# Patient Record
Sex: MALE | Race: WHITE | NOT HISPANIC OR LATINO | URBAN - METROPOLITAN AREA
[De-identification: names, ages, dates, MRNs, and addresses within clinical notes are randomized per-mention and may not be internally consistent; named-entity substitution may affect disease eponyms.]

---

## 2019-06-19 ENCOUNTER — INPATIENT (INPATIENT)
Facility: HOSPITAL | Age: 59
LOS: 0 days | Discharge: HOME | DRG: 203 | End: 2019-06-20
Attending: STUDENT IN AN ORGANIZED HEALTH CARE EDUCATION/TRAINING PROGRAM | Admitting: STUDENT IN AN ORGANIZED HEALTH CARE EDUCATION/TRAINING PROGRAM
Payer: COMMERCIAL

## 2019-06-19 VITALS — HEIGHT: 73 IN | WEIGHT: 250 LBS

## 2019-06-19 DIAGNOSIS — E86.0 DEHYDRATION: ICD-10-CM

## 2019-06-19 DIAGNOSIS — J39.9 DISEASE OF UPPER RESPIRATORY TRACT, UNSPECIFIED: ICD-10-CM

## 2019-06-19 DIAGNOSIS — R00.0 TACHYCARDIA, UNSPECIFIED: ICD-10-CM

## 2019-06-19 DIAGNOSIS — J45.909 UNSPECIFIED ASTHMA, UNCOMPLICATED: ICD-10-CM

## 2019-06-19 DIAGNOSIS — J45.901 UNSPECIFIED ASTHMA WITH (ACUTE) EXACERBATION: ICD-10-CM

## 2019-06-19 DIAGNOSIS — R09.02 HYPOXEMIA: ICD-10-CM

## 2019-06-19 LAB
ALBUMIN SERPL ELPH-MCNC: 4.3 G/DL — SIGNIFICANT CHANGE UP (ref 3.3–5)
ALP SERPL-CCNC: 49 U/L — SIGNIFICANT CHANGE UP (ref 40–120)
ALT FLD-CCNC: 47 U/L DA — HIGH (ref 10–45)
ANION GAP SERPL CALC-SCNC: 9 MMOL/L — SIGNIFICANT CHANGE UP (ref 5–17)
APTT BLD: 31.5 SEC — SIGNIFICANT CHANGE UP (ref 27.5–36.3)
AST SERPL-CCNC: 27 U/L — SIGNIFICANT CHANGE UP (ref 10–40)
BASOPHILS # BLD AUTO: 0.06 K/UL — SIGNIFICANT CHANGE UP (ref 0–0.2)
BASOPHILS NFR BLD AUTO: 0.5 % — SIGNIFICANT CHANGE UP (ref 0–2)
BILIRUB SERPL-MCNC: 1.3 MG/DL — HIGH (ref 0.2–1.2)
BUN SERPL-MCNC: 17 MG/DL — SIGNIFICANT CHANGE UP (ref 7–23)
CALCIUM SERPL-MCNC: 9.2 MG/DL — SIGNIFICANT CHANGE UP (ref 8.4–10.5)
CHLORIDE SERPL-SCNC: 104 MMOL/L — SIGNIFICANT CHANGE UP (ref 96–108)
CO2 BLDA-SCNC: 25 MMOL/L — SIGNIFICANT CHANGE UP (ref 22–30)
CO2 SERPL-SCNC: 28 MMOL/L — SIGNIFICANT CHANGE UP (ref 22–31)
CREAT SERPL-MCNC: 1.14 MG/DL — SIGNIFICANT CHANGE UP (ref 0.5–1.3)
D DIMER BLD IA.RAPID-MCNC: 241 NG/ML DDU — HIGH
EOSINOPHIL # BLD AUTO: 0.12 K/UL — SIGNIFICANT CHANGE UP (ref 0–0.5)
EOSINOPHIL NFR BLD AUTO: 1 % — SIGNIFICANT CHANGE UP (ref 0–6)
GLUCOSE SERPL-MCNC: 150 MG/DL — HIGH (ref 70–99)
HCT VFR BLD CALC: 53.3 % — HIGH (ref 39–50)
HGB BLD-MCNC: 18.4 G/DL — HIGH (ref 13–17)
HOROWITZ INDEX BLDA+IHG-RTO: SIGNIFICANT CHANGE UP
IMM GRANULOCYTES NFR BLD AUTO: 0.3 % — SIGNIFICANT CHANGE UP (ref 0–1.5)
INR BLD: 1.09 RATIO — SIGNIFICANT CHANGE UP (ref 0.88–1.16)
LYMPHOCYTES # BLD AUTO: 0.88 K/UL — LOW (ref 1–3.3)
LYMPHOCYTES # BLD AUTO: 7.2 % — LOW (ref 13–44)
MCHC RBC-ENTMCNC: 30.2 PG — SIGNIFICANT CHANGE UP (ref 27–34)
MCHC RBC-ENTMCNC: 34.5 GM/DL — SIGNIFICANT CHANGE UP (ref 32–36)
MCV RBC AUTO: 87.5 FL — SIGNIFICANT CHANGE UP (ref 80–100)
MONOCYTES # BLD AUTO: 0.73 K/UL — SIGNIFICANT CHANGE UP (ref 0–0.9)
MONOCYTES NFR BLD AUTO: 5.9 % — SIGNIFICANT CHANGE UP (ref 2–14)
NEUTROPHILS # BLD AUTO: 10.46 K/UL — HIGH (ref 1.8–7.4)
NEUTROPHILS NFR BLD AUTO: 85.1 % — HIGH (ref 43–77)
NRBC # BLD: 0 /100 WBCS — SIGNIFICANT CHANGE UP (ref 0–0)
NT-PROBNP SERPL-SCNC: 26 PG/ML — SIGNIFICANT CHANGE UP (ref 0–300)
PCO2 BLDA: 36 MMHG — SIGNIFICANT CHANGE UP (ref 32–46)
PH BLDA: 7.44 — SIGNIFICANT CHANGE UP (ref 7.35–7.45)
PLATELET # BLD AUTO: 169 K/UL — SIGNIFICANT CHANGE UP (ref 150–400)
PO2 BLDA: 54 MMHG — LOW (ref 74–108)
POTASSIUM SERPL-MCNC: 4 MMOL/L — SIGNIFICANT CHANGE UP (ref 3.5–5.3)
POTASSIUM SERPL-SCNC: 4 MMOL/L — SIGNIFICANT CHANGE UP (ref 3.5–5.3)
PROT SERPL-MCNC: 8.2 G/DL — SIGNIFICANT CHANGE UP (ref 6–8.3)
PROTHROM AB SERPL-ACNC: 12.2 SEC — SIGNIFICANT CHANGE UP (ref 10–12.9)
RBC # BLD: 6.09 M/UL — HIGH (ref 4.2–5.8)
RBC # FLD: 12.5 % — SIGNIFICANT CHANGE UP (ref 10.3–14.5)
SAO2 % BLDA: 89 % — LOW (ref 92–96)
SODIUM SERPL-SCNC: 141 MMOL/L — SIGNIFICANT CHANGE UP (ref 135–145)
TROPONIN I SERPL-MCNC: 0.02 NG/ML — SIGNIFICANT CHANGE UP (ref 0.02–0.06)
TROPONIN I SERPL-MCNC: <.017 NG/ML — LOW (ref 0.02–0.06)
WBC # BLD: 12.29 K/UL — HIGH (ref 3.8–10.5)
WBC # FLD AUTO: 12.29 K/UL — HIGH (ref 3.8–10.5)

## 2019-06-19 PROCEDURE — 99291 CRITICAL CARE FIRST HOUR: CPT

## 2019-06-19 PROCEDURE — 93010 ELECTROCARDIOGRAM REPORT: CPT | Mod: 77

## 2019-06-19 PROCEDURE — 71045 X-RAY EXAM CHEST 1 VIEW: CPT | Mod: 26

## 2019-06-19 PROCEDURE — 71275 CT ANGIOGRAPHY CHEST: CPT | Mod: 26

## 2019-06-19 PROCEDURE — 99223 1ST HOSP IP/OBS HIGH 75: CPT

## 2019-06-19 RX ORDER — ONDANSETRON 8 MG/1
4 TABLET, FILM COATED ORAL ONCE
Refills: 0 | Status: COMPLETED | OUTPATIENT
Start: 2019-06-19 | End: 2019-06-19

## 2019-06-19 RX ORDER — IPRATROPIUM/ALBUTEROL SULFATE 18-103MCG
3 AEROSOL WITH ADAPTER (GRAM) INHALATION ONCE
Refills: 0 | Status: COMPLETED | OUTPATIENT
Start: 2019-06-19 | End: 2019-06-19

## 2019-06-19 RX ORDER — IPRATROPIUM/ALBUTEROL SULFATE 18-103MCG
3 AEROSOL WITH ADAPTER (GRAM) INHALATION EVERY 6 HOURS
Refills: 0 | Status: DISCONTINUED | OUTPATIENT
Start: 2019-06-19 | End: 2019-06-20

## 2019-06-19 RX ORDER — IBUPROFEN 200 MG
400 TABLET ORAL ONCE
Refills: 0 | Status: COMPLETED | OUTPATIENT
Start: 2019-06-19 | End: 2019-06-19

## 2019-06-19 RX ORDER — PANTOPRAZOLE SODIUM 20 MG/1
40 TABLET, DELAYED RELEASE ORAL ONCE
Refills: 0 | Status: COMPLETED | OUTPATIENT
Start: 2019-06-19 | End: 2019-06-19

## 2019-06-19 RX ORDER — MAGNESIUM SULFATE 500 MG/ML
1 VIAL (ML) INJECTION
Refills: 0 | Status: COMPLETED | OUTPATIENT
Start: 2019-06-19 | End: 2019-06-19

## 2019-06-19 RX ORDER — FAMOTIDINE 10 MG/ML
20 INJECTION INTRAVENOUS DAILY
Refills: 0 | Status: DISCONTINUED | OUTPATIENT
Start: 2019-06-20 | End: 2019-06-20

## 2019-06-19 RX ORDER — ACETAMINOPHEN 500 MG
650 TABLET ORAL EVERY 6 HOURS
Refills: 0 | Status: DISCONTINUED | OUTPATIENT
Start: 2019-06-19 | End: 2019-06-20

## 2019-06-19 RX ORDER — ALBUTEROL 90 UG/1
1 AEROSOL, METERED ORAL EVERY 4 HOURS
Refills: 0 | Status: DISCONTINUED | OUTPATIENT
Start: 2019-06-19 | End: 2019-06-20

## 2019-06-19 RX ORDER — SODIUM CHLORIDE 9 MG/ML
1000 INJECTION INTRAMUSCULAR; INTRAVENOUS; SUBCUTANEOUS ONCE
Refills: 0 | Status: COMPLETED | OUTPATIENT
Start: 2019-06-19 | End: 2019-06-19

## 2019-06-19 RX ORDER — ONDANSETRON 8 MG/1
4 TABLET, FILM COATED ORAL EVERY 6 HOURS
Refills: 0 | Status: DISCONTINUED | OUTPATIENT
Start: 2019-06-19 | End: 2019-06-20

## 2019-06-19 RX ORDER — MAGNESIUM SULFATE 500 MG/ML
2 VIAL (ML) INJECTION ONCE
Refills: 0 | Status: DISCONTINUED | OUTPATIENT
Start: 2019-06-19 | End: 2019-06-19

## 2019-06-19 RX ORDER — TIOTROPIUM BROMIDE 18 UG/1
1 CAPSULE ORAL; RESPIRATORY (INHALATION) DAILY
Refills: 0 | Status: DISCONTINUED | OUTPATIENT
Start: 2019-06-19 | End: 2019-06-20

## 2019-06-19 RX ORDER — SODIUM CHLORIDE 9 MG/ML
1000 INJECTION INTRAMUSCULAR; INTRAVENOUS; SUBCUTANEOUS
Refills: 0 | Status: DISCONTINUED | OUTPATIENT
Start: 2019-06-19 | End: 2019-06-20

## 2019-06-19 RX ADMIN — Medication 110 MILLIGRAM(S): at 13:58

## 2019-06-19 RX ADMIN — SODIUM CHLORIDE 1000 MILLILITER(S): 9 INJECTION INTRAMUSCULAR; INTRAVENOUS; SUBCUTANEOUS at 12:00

## 2019-06-19 RX ADMIN — PANTOPRAZOLE SODIUM 40 MILLIGRAM(S): 20 TABLET, DELAYED RELEASE ORAL at 21:42

## 2019-06-19 RX ADMIN — Medication 400 MILLIGRAM(S): at 22:45

## 2019-06-19 RX ADMIN — SODIUM CHLORIDE 150 MILLILITER(S): 9 INJECTION INTRAMUSCULAR; INTRAVENOUS; SUBCUTANEOUS at 15:45

## 2019-06-19 RX ADMIN — Medication 1 GRAM(S): at 11:33

## 2019-06-19 RX ADMIN — Medication 650 MILLIGRAM(S): at 18:37

## 2019-06-19 RX ADMIN — SODIUM CHLORIDE 1000 MILLILITER(S): 9 INJECTION INTRAMUSCULAR; INTRAVENOUS; SUBCUTANEOUS at 11:50

## 2019-06-19 RX ADMIN — Medication 40 MILLIGRAM(S): at 20:19

## 2019-06-19 RX ADMIN — Medication 3 MILLILITER(S): at 11:53

## 2019-06-19 RX ADMIN — ONDANSETRON 4 MILLIGRAM(S): 8 TABLET, FILM COATED ORAL at 21:42

## 2019-06-19 RX ADMIN — Medication 3 MILLILITER(S): at 21:43

## 2019-06-19 RX ADMIN — Medication 125 MILLIGRAM(S): at 08:33

## 2019-06-19 RX ADMIN — Medication 650 MILLIGRAM(S): at 19:30

## 2019-06-19 RX ADMIN — Medication 1 GRAM(S): at 13:11

## 2019-06-19 RX ADMIN — Medication 3 MILLILITER(S): at 11:40

## 2019-06-19 RX ADMIN — Medication 3 MILLILITER(S): at 08:10

## 2019-06-19 RX ADMIN — SODIUM CHLORIDE 1000 MILLILITER(S): 9 INJECTION INTRAMUSCULAR; INTRAVENOUS; SUBCUTANEOUS at 10:34

## 2019-06-19 RX ADMIN — Medication 100 GRAM(S): at 12:11

## 2019-06-19 RX ADMIN — Medication 100 GRAM(S): at 10:32

## 2019-06-19 RX ADMIN — Medication 400 MILLIGRAM(S): at 21:42

## 2019-06-19 RX ADMIN — Medication 3 MILLILITER(S): at 15:44

## 2019-06-19 RX ADMIN — SODIUM CHLORIDE 150 MILLILITER(S): 9 INJECTION INTRAMUSCULAR; INTRAVENOUS; SUBCUTANEOUS at 20:19

## 2019-06-19 RX ADMIN — SODIUM CHLORIDE 1000 MILLILITER(S): 9 INJECTION INTRAMUSCULAR; INTRAVENOUS; SUBCUTANEOUS at 10:38

## 2019-06-19 NOTE — H&P ADULT - NSHPPHYSICALEXAM_GEN_ALL_CORE
T(C): 36.7 (06-19-19 @ 12:04), Max: 36.9 (06-19-19 @ 08:15)  HR: 110 (06-19-19 @ 13:26) (108 - 133)  BP: 159/86 (06-19-19 @ 13:26) (115/89 - 159/86)  RR: 19 (06-19-19 @ 13:26) (14 - 22)  SpO2: 92% (06-19-19 @ 13:26) (87% - 92%)  Wt(kg): --Vital Signs Last 24 Hrs  T(C): 36.7 (19 Jun 2019 12:04), Max: 36.9 (19 Jun 2019 08:15)  T(F): 98 (19 Jun 2019 12:04), Max: 98.5 (19 Jun 2019 08:15)  HR: 110 (19 Jun 2019 13:26) (108 - 133)  BP: 159/86 (19 Jun 2019 13:26) (115/89 - 159/86)  BP(mean): --  RR: 19 (19 Jun 2019 13:26) (14 - 22)  SpO2: 92% (19 Jun 2019 13:26) (87% - 92%)    PHYSICAL EXAM:  GENERAL: NAD, well-groomed, well-developed  HEAD:  Atraumatic, Normocephalic  EYES: EOMI, PERRLA, conjunctiva and sclera clear  ENMT: No tonsillar erythema, exudates, or enlargement; Moist mucous membranes, Good dentition, No lesions  NECK: Supple, No JVD, Normal thyroid  NERVOUS SYSTEM:  Alert & Oriented X3, Good concentration; Motor Strength 5/5 B/L upper and lower extremities; DTRs 2+ intact and symmetric  CHEST/LUNG: poor air entry throughout, no crackles or rhonchi heard  HEART: Regular rate and rhythm; No murmurs, rubs, or gallops; tachycardic  ABDOMEN: Soft, Nontender, Nondistended; obese  EXTREMITIES:  2+ Peripheral Pulses, No clubbing, cyanosis, +1 edema bilaterally  LYMPH: No lymphadenopathy noted  SKIN: No rashes or lesions

## 2019-06-19 NOTE — ED ADULT NURSE NOTE - OBJECTIVE STATEMENT
pt states his symptoms started yesterday, and worsened after 7 pm last night, history of asthma, took mucinex, BBB lungs with wheezes bilaterally. [id number] labored upon arrival with difficulty completing sentences. denies fever, complains of cough with intermittent mucus production this am color green, dark.

## 2019-06-19 NOTE — H&P ADULT - ASSESSMENT
59 year old with a history of asthma comes with 24 hours of shortness of breath.  Reports having a daughter living in his home who has had an upper respiratory infection over the last week.  6/18 developed cough with greenish sputum production, overnight had progressive difficulty breathing and came into the ED.  CTangio was done and ruled out PE.   Patient here with asthma exacerbation secondary to upper respiratory infection      IMPROVE VTE Individual Risk Assessment    RISK                                                                Points    [  ] Previous VTE                                                  3    [  ] Thrombophilia                                               2    [  ] Lower limb paralysis                                      2        (unable to hold up >15 seconds)      [  ] Current Cancer                                              2         (within 6 months)    [  ] Immobilization > 24 hrs                                1    [  ] ICU/CCU stay > 24 hours                              1    [  ] Age > 60                                                      1    IMPROVE VTE Score ______0___    IMPROVE Score 0-1: Low Risk, No VTE prophylaxis required for most patients, encourage ambulation.   IMPROVE Score 2-3: At risk, pharmacologic VTE prophylaxis is indicated for most patients (in the absence of a contraindication)  IMPROVE Score > or = 4: High Risk, pharmacologic VTE prophylaxis is indicated for most patients (in the absence of a contraindication)

## 2019-06-19 NOTE — ED PROVIDER NOTE - CRITICAL CARE PROVIDED
consultation with other physicians/interpretation of diagnostic studies/consult w/ pt's family directly relating to pts condition/additional history taking/direct patient care (not related to procedure)/documentation

## 2019-06-19 NOTE — H&P ADULT - NSHPLABSRESULTS_GEN_ALL_CORE
LABS:                        18.4   12.29 )-----------( 169      ( 19 Jun 2019 08:15 )             53.3     06-19    141  |  104  |  17  ----------------------------<  150<H>  4.0   |  28  |  1.14    Ca    9.2      19 Jun 2019 08:15    TPro  8.2  /  Alb  4.3  /  TBili  1.3<H>  /  DBili  x   /  AST  27  /  ALT  47<H>  /  AlkPhos  49  06-19    PT/INR - ( 19 Jun 2019 08:15 )   PT: 12.2 sec;   INR: 1.09 ratio         PTT - ( 19 Jun 2019 08:15 )  PTT:31.5 sec     CAPILLARY BLOOD GLUCOSE          ABG - ( 19 Jun 2019 09:03 )  pH, Arterial: 7.44  pH, Blood: x     /  pCO2: 36    /  pO2: 54    / HCO3: x     / Base Excess: x     /  SaO2: 89                    RADIOLOGY & ADDITIONAL TESTS:  < from: CT Angio Chest w/ IV Cont (06.19.19 @ 09:50) >        < end of copied text >    < from: Xray Chest 1 View- PORTABLE-Urgent (06.19.19 @ 08:57) >    IMPRESSION: No acute findings.    < end of copied text >      Consultant(s) Notes Reviewed:  [x ] YES  [ ] NO  Care Discussed with Consultants/Other Providers [ x] YES  [ ] NO  Imaging Personally Reviewed:  [ ] YES  [ ] NO

## 2019-06-19 NOTE — CONSULT NOTE ADULT - SUBJECTIVE AND OBJECTIVE BOX
HPI:HPI:  59 year old with a history of asthma comes with 24 hours of shortness of breath.  Reports having a daughter living in his home who has had an upper respiratory infection over the last week.  6/18 developed cough with greenish sputum production, overnight had progressive difficulty breathing and came into the ED.  CTangio was done and ruled out PE. (19 Jun 2019 13:14)      PAST MEDICAL & SURGICAL HISTORY:  Asthma has been inactive for approximately 4 years. 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 No weight loss. .  Eyes: No itching or discharge from the eyes  ENT: .nasal congestion  CV: No chest pain. No palpitations. No lightheadedness or dizziness.   Resp: progressive cough and dyspnea with wheezing over the last  GI: No nausea. No vomiting. No diarrhea.  MSK: No joint pain or pain in any extremities  Integumentary: No skin lesions. No pedal edema.  Neurological: No gross motor weakness. No sensory changes.      OBJECTIVE:  ICU Vital Signs Last 24 Hrs  T(C): 36.7 (19 Jun 2019 12:04), Max: 36.9 (19 Jun 2019 08:15)  T(F): 98 (19 Jun 2019 12:04), Max: 98.5 (19 Jun 2019 08:15)  HR: 112 (19 Jun 2019 15:24) (108 - 133)  BP: 141/83 (19 Jun 2019 15:24) (115/89 - 159/86)  BP(mean): --  ABP: --  ABP(mean): --  RR: 16 (19 Jun 2019 15:55) (14 - 22)  SpO2: 93% (19 Jun 2019 15:55) (87% - 94%)        CAPILLARY BLOOD GLUCOSE          PHYSICAL EXAM:  General: Awake, alert, oriented X 3.   HEENT: Atraumatic, normocephalic.                            .  Lymph Nodes: No palpable lymphadenopathy  Neck: No JVD. No carotid bruit.   respiratory:  scattered wheezing  Cardiovascular: tachycardia  Abdomen: Soft, non-tender,   Extremities: Warm to touch.    Skin: No rashes or skin lesions  Neurological: Motor and sensory examination equal and normal in all four extremities.  Psychiatry: Appropriate mood and affect.    HOSPITAL MEDICATIONS:  MEDICATIONS  (STANDING):  ALBUTerol    90 MICROgram(s) HFA Inhaler 1 Puff(s) Inhalation every 4 hours  ALBUTerol/ipratropium for Nebulization 3 milliLiter(s) Nebulizer every 6 hours  doxycycline IVPB      methylPREDNISolone sodium succinate Injectable 40 milliGRAM(s) IV Push two times a day  sodium chloride 0.9%. 1000 milliLiter(s) (150 mL/Hr) IV Continuous <Continuous>  tiotropium 18 MICROgram(s) Capsule 1 Capsule(s) Inhalation daily    MEDICATIONS  (PRN):  benzonatate 100 milliGRAM(s) Oral every 8 hours PRN Cough  guaiFENesin    Syrup 200 milliGRAM(s) Oral every 6 hours PRN Cough      LABS:                        18.4   12.29 )-----------( 169      ( 19 Jun 2019 08:15 )             53.3     06-19    141  |  104  |  17  ----------------------------<  150<H>  4.0   |  28  |  1.14    Ca    9.2      19 Jun 2019 08:15    TPro  8.2  /  Alb  4.3  /  TBili  1.3<H>  /  DBili  x   /  AST  27  /  ALT  47<H>  /  AlkPhos  49  06-19    PT/INR - ( 19 Jun 2019 08:15 )   PT: 12.2 sec;   INR: 1.09 ratio         PTT - ( 19 Jun 2019 08:15 )  PTT:31.5 sec    Arterial Blood Gas:  06-19 @ 09:03  7.44/36/54/--/89/--  ABG lactate: --        MICROBIOLOGY:     RADIOLOGY:  [ ] Reviewed and interpreted by me     < from: CT Angio Chest w/ IV Cont (06.19.19 @ 09:50) >  EXAM:  CT ANGIO CHEST (W)AW IC      PROCEDURE DATE:  06/19/2019        INTERPRETATION:  CT angiogram chest with contrast    History shortness of breath, tachycardia, elevated d-dimer    Axial MIPS included    Contrast Omnipaque 90 cc; 10 cc discarded    There is no pulmonary embolism. There is no lobar consolidation, central   edema or layering effusion. There is a tiny calcified granuloma in the   left apex. The heart is normal in size. There is no pericardial effusion.    There is mild right axillary adenopathy with a dominant node measuring   1.5 cm in short axis dimension. There is no evidence of central necrosis.   There is no axillary or mediastinal adenopathy. There are small   left-sided thyroid nodules ranging up to 1.7 cm in maximum dimension.    IMPRESSION:    Negative for pulmonary embolism. No acute pulmonary findings. Mild right   axillary adenopathy. Small left thyroid nodules.    < end of copied text >  EKG:

## 2019-06-19 NOTE — ED PROVIDER NOTE - PROGRESS NOTE DETAILS
Pt feels a lot better, no respiratory distress, speaking in complete sentences, not tachypneic. +mild wheezes. Pt hypoxic to 88% on RA, up to 92% on 3L. Awaiting CTA res. Explained to pt importance of being admitted for asthma exacerbation with hypoxia, however pt states because he feels much better he wants to go home. Wants some time to consider admission. CTA results explained to pt. Pt states he feels better but still hypoxic to 92% on 3L. Stressed to pt and wife importance of admission, however pt still insisting on going home. Risk of anoxia and respiratory arrest resulting in death explained to pt. Pt wants more time to decide. Pt agreeable to admission.

## 2019-06-19 NOTE — CONSULT NOTE ADULT - ASSESSMENT
59 year old with a history of asthma comes with 24 hours of shortness of breath.  Reports having a daughter living in his home who has had an upper respiratory infection over the last week.  6/18 developed cough with greenish sputum production, overnight had progressive difficulty breathing and came into the ED.  CTangio was done and ruled out PE.   Patient here with asthma exacerbation secondary to upper respiratory infection      Pulmonary:  This most likely represents a post viral pneumonitis. Agree with current therapy. Awaiting respiratory viral panel results. If clinically improved we'll switch to oral steroids tomorrow in the a.m.

## 2019-06-19 NOTE — ED PROVIDER NOTE - CARE PLAN
Principal Discharge DX:	Moderate asthma with acute exacerbation, unspecified whether persistent Principal Discharge DX:	Moderate asthma with acute exacerbation, unspecified whether persistent  Secondary Diagnosis:	Hypoxia Principal Discharge DX:	Moderate asthma with acute exacerbation, unspecified whether persistent  Secondary Diagnosis:	Hypoxia  Secondary Diagnosis:	Dehydration

## 2019-06-19 NOTE — ED PROVIDER NOTE - CLINICAL SUMMARY MEDICAL DECISION MAKING FREE TEXT BOX
Pt p/w asthma exacerbation, will treat accordingly. Given age, obesity, fam h/o CAD and recent travel will also check Duncan and dimer. Pt p/w asthma exacerbation, improved with duonebs, solumedrol and mag. Given hypoxia, tachycardia and travel, pt ruled out for PE. Pt p/w asthma exacerbation, improved with duonebs, solumedrol and mag. Given hypoxia, tachycardia and travel, pt ruled out for PE. Pt with persistent tachycardia and hypoxia so admitted with asthma exacerbation.

## 2019-06-19 NOTE — ED PROVIDER NOTE - OBJECTIVE STATEMENT
59M h/o mild asthma, no h/o ED visits or intubations, p/w gradual onset of wheezing and SOB since last night. Has not taken any meds. No fevers or chills, no chest pain. Has had dry cough and mild congestion. Had a >2h car ride from the [facility] yesterday when the sx started. No personal or fam h/o DVT/PE but father has CAD. Accompanied by son.

## 2019-06-19 NOTE — H&P ADULT - HISTORY OF PRESENT ILLNESS
59 year old with a history of asthma comes with 24 hours of shortness of breath.  Reports having a daughter living in his home who has had an upper respiratory infection over the last week.  6/18 developed cough with greenish sputum production, overnight had progressive difficulty breathing and came into the ED.  CTangio was done and ruled out PE.

## 2019-06-19 NOTE — H&P ADULT - NSHPREVIEWOFSYSTEMS_GEN_ALL_CORE
REVIEW OF SYSTEMS:  CONSTITUTIONAL: No fever, weight loss, or fatigue  EYES: No eye pain, visual disturbances, or discharge  ENMT:  No difficulty hearing, tinnitus, vertigo; No sinus or throat pain  NECK: No pain or stiffness  BREASTS: No pain, masses, or nipple discharge  RESPIRATORY: + cough, wheezing, chills no hemoptysis; + shortness of breath  CARDIOVASCULAR: No chest pain, palpitations, dizziness, or leg swelling  GASTROINTESTINAL: No abdominal or epigastric pain. No nausea, vomiting, or hematemesis; No diarrhea or constipation. No melena or hematochezia.  GENITOURINARY: No dysuria, frequency, hematuria, or incontinence  NEUROLOGICAL: No headaches, memory loss, loss of strength, numbness, or tremors  SKIN: No itching, burning, rashes, or lesions   LYMPH NODES: No enlarged glands  ENDOCRINE: No heat or cold intolerance; No hair loss  MUSCULOSKELETAL: No joint pain or swelling; No muscle, back, or extremity pain  PSYCHIATRIC: No depression, anxiety, mood swings, or difficulty sleeping  HEME/LYMPH: No easy bruising, or bleeding gums  ALLERY AND IMMUNOLOGIC: No hives or eczema    ALL ROS REVIEWED AND NORMAL EXCEPT AS STATED ABOVE

## 2019-06-20 ENCOUNTER — TRANSCRIPTION ENCOUNTER (OUTPATIENT)
Age: 59
End: 2019-06-20

## 2019-06-20 ENCOUNTER — INBOUND DOCUMENT (OUTPATIENT)
Age: 59
End: 2019-06-20

## 2019-06-20 VITALS — OXYGEN SATURATION: 98 %

## 2019-06-20 PROBLEM — J45.909 UNSPECIFIED ASTHMA, UNCOMPLICATED: Chronic | Status: ACTIVE | Noted: 2019-06-19

## 2019-06-20 LAB
ANION GAP SERPL CALC-SCNC: 11 MMOL/L — SIGNIFICANT CHANGE UP (ref 5–17)
BASOPHILS # BLD AUTO: 0.02 K/UL — SIGNIFICANT CHANGE UP (ref 0–0.2)
BASOPHILS NFR BLD AUTO: 0.2 % — SIGNIFICANT CHANGE UP (ref 0–2)
BUN SERPL-MCNC: 16 MG/DL — SIGNIFICANT CHANGE UP (ref 7–23)
CALCIUM SERPL-MCNC: 8.3 MG/DL — LOW (ref 8.4–10.5)
CHLORIDE SERPL-SCNC: 107 MMOL/L — SIGNIFICANT CHANGE UP (ref 96–108)
CO2 SERPL-SCNC: 24 MMOL/L — SIGNIFICANT CHANGE UP (ref 22–31)
CREAT SERPL-MCNC: 1.07 MG/DL — SIGNIFICANT CHANGE UP (ref 0.5–1.3)
EOSINOPHIL # BLD AUTO: 0 K/UL — SIGNIFICANT CHANGE UP (ref 0–0.5)
EOSINOPHIL NFR BLD AUTO: 0 % — SIGNIFICANT CHANGE UP (ref 0–6)
GLUCOSE SERPL-MCNC: 188 MG/DL — HIGH (ref 70–99)
HCT VFR BLD CALC: 47.4 % — SIGNIFICANT CHANGE UP (ref 39–50)
HCV AB S/CO SERPL IA: 0.15 S/CO — SIGNIFICANT CHANGE UP (ref 0–0.99)
HCV AB SERPL-IMP: SIGNIFICANT CHANGE UP
HGB BLD-MCNC: 16 G/DL — SIGNIFICANT CHANGE UP (ref 13–17)
IMM GRANULOCYTES NFR BLD AUTO: 0.6 % — SIGNIFICANT CHANGE UP (ref 0–1.5)
LYMPHOCYTES # BLD AUTO: 0.5 K/UL — LOW (ref 1–3.3)
LYMPHOCYTES # BLD AUTO: 4.8 % — LOW (ref 13–44)
MCHC RBC-ENTMCNC: 30.4 PG — SIGNIFICANT CHANGE UP (ref 27–34)
MCHC RBC-ENTMCNC: 33.8 GM/DL — SIGNIFICANT CHANGE UP (ref 32–36)
MCV RBC AUTO: 89.9 FL — SIGNIFICANT CHANGE UP (ref 80–100)
MONOCYTES # BLD AUTO: 0.29 K/UL — SIGNIFICANT CHANGE UP (ref 0–0.9)
MONOCYTES NFR BLD AUTO: 2.8 % — SIGNIFICANT CHANGE UP (ref 2–14)
NEUTROPHILS # BLD AUTO: 9.5 K/UL — HIGH (ref 1.8–7.4)
NEUTROPHILS NFR BLD AUTO: 91.6 % — HIGH (ref 43–77)
NRBC # BLD: 0 /100 WBCS — SIGNIFICANT CHANGE UP (ref 0–0)
PLATELET # BLD AUTO: 153 K/UL — SIGNIFICANT CHANGE UP (ref 150–400)
POTASSIUM SERPL-MCNC: 3.9 MMOL/L — SIGNIFICANT CHANGE UP (ref 3.5–5.3)
POTASSIUM SERPL-SCNC: 3.9 MMOL/L — SIGNIFICANT CHANGE UP (ref 3.5–5.3)
RAPID RVP RESULT: DETECTED
RBC # BLD: 5.27 M/UL — SIGNIFICANT CHANGE UP (ref 4.2–5.8)
RBC # FLD: 13 % — SIGNIFICANT CHANGE UP (ref 10.3–14.5)
RV+EV RNA SPEC QL NAA+PROBE: DETECTED
SODIUM SERPL-SCNC: 142 MMOL/L — SIGNIFICANT CHANGE UP (ref 135–145)
WBC # BLD: 10.37 K/UL — SIGNIFICANT CHANGE UP (ref 3.8–10.5)
WBC # FLD AUTO: 10.37 K/UL — SIGNIFICANT CHANGE UP (ref 3.8–10.5)

## 2019-06-20 PROCEDURE — 99232 SBSQ HOSP IP/OBS MODERATE 35: CPT

## 2019-06-20 PROCEDURE — 99239 HOSP IP/OBS DSCHRG MGMT >30: CPT

## 2019-06-20 RX ORDER — ALBUTEROL 90 UG/1
2 AEROSOL, METERED ORAL
Qty: 1 | Refills: 0
Start: 2019-06-20

## 2019-06-20 RX ORDER — IPRATROPIUM/ALBUTEROL SULFATE 18-103MCG
3 AEROSOL WITH ADAPTER (GRAM) INHALATION
Qty: 500 | Refills: 0
Start: 2019-06-20 | End: 2019-07-03

## 2019-06-20 RX ADMIN — Medication 3 MILLILITER(S): at 09:26

## 2019-06-20 RX ADMIN — Medication 110 MILLIGRAM(S): at 05:41

## 2019-06-20 RX ADMIN — Medication 40 MILLIGRAM(S): at 06:11

## 2019-06-20 RX ADMIN — Medication 3 MILLILITER(S): at 04:14

## 2019-06-20 RX ADMIN — SODIUM CHLORIDE 150 MILLILITER(S): 9 INJECTION INTRAMUSCULAR; INTRAVENOUS; SUBCUTANEOUS at 05:41

## 2019-06-20 NOTE — DISCHARGE NOTE PROVIDER - NSDCCPCAREPLAN_GEN_ALL_CORE_FT
PRINCIPAL DISCHARGE DIAGNOSIS  Diagnosis: Moderate asthma with acute exacerbation, unspecified whether persistent  Assessment and Plan of Treatment:       SECONDARY DISCHARGE DIAGNOSES  Diagnosis: Dehydration  Assessment and Plan of Treatment:     Diagnosis: Hypoxia  Assessment and Plan of Treatment:

## 2019-06-20 NOTE — DISCHARGE NOTE NURSING/CASE MANAGEMENT/SOCIAL WORK - NSDCDPATPORTLINK_GEN_ALL_CORE
You can access the [facility] Patient Portal, offered by [facility], by registering with the following website: [email or web address]

## 2019-06-20 NOTE — PROGRESS NOTE ADULT - PROBLEM SELECTOR PLAN 1
Pulm consulted  continue steroids  Continue nebulizers  Tele monitoring  treat URI Improved asthma  Found positive for enterovirus/rhinovirus  Tele monitoring dced  appreciate pulm consult  Will dc with nebs and steroid talya

## 2019-06-20 NOTE — PROGRESS NOTE ADULT - PROBLEM SELECTOR PLAN 6
Started doxy for community acquired URI  Will look for pulm recommendations Stopped antibiotics  Positive for virus

## 2019-06-20 NOTE — PROGRESS NOTE ADULT - PROBLEM SELECTOR PLAN 1
Improved asthma  Found positive for enterovirus/rhinovirus  Tele monitoring dced  appreciate pulm consult  Will dc with nebs and steroid talya

## 2019-06-20 NOTE — PROGRESS NOTE ADULT - ASSESSMENT
59 year old with a history of asthma comes with 24 hours of shortness of breath.  Reports having a daughter living in his home who has had an upper respiratory infection over the last week.  6/18 developed cough with greenish sputum production, overnight had progressive difficulty breathing and came into the ED.  CTangio was done and ruled out PE.   Patient here with asthma exacerbation secondary to upper respiratory infection
59 year old with a history of asthma comes with 24 hours of shortness of breath.  Reports having a daughter living in his home who has had an upper respiratory infection over the last week.  6/18 developed cough with greenish sputum production, overnight had progressive difficulty breathing and came into the ED.  CTangio was done and ruled out PE.   Patient here with asthma exacerbation secondary to upper respiratory infection    Clinically improved today.  Out pt f/u 1 to 2 weeks.  Pt has been asked to call if any recurrence of dyspnea.

## 2019-06-20 NOTE — DISCHARGE NOTE PROVIDER - CARE PROVIDER_API CALL
Stephon Tarango)  Critical Care Medicine  [address] [place] [address], [address]  [place] [address]  Phone: [phone]  Fax: [phone]  Follow Up Time:

## 2019-06-20 NOTE — DISCHARGE NOTE PROVIDER - HOSPITAL COURSE
59 year with history of asthma came with acute asthma exacerbation.  Was found to have enterovirus.  Had one dose of antibiotics (doxy).  Was seen by pulmonary.    Will go home with steroids and nebulizer treatments.    Will follow with Dr. Tarango pulmonary as an outpatient.  Dr. Tarango aware of discharge plan.

## 2019-06-20 NOTE — PROGRESS NOTE ADULT - PROBLEM SELECTOR PLAN 4
Given iV fluids  Will continue maintenance fluids for another 24 hours IVF stopped  Patient appears well hydrated now

## 2019-06-20 NOTE — PROGRESS NOTE ADULT - PROBLEM SELECTOR PROBLEM 1
Moderate asthma with acute exacerbation, unspecified whether persistent
Moderate asthma with acute exacerbation, unspecified whether persistent

## 2019-06-20 NOTE — PROGRESS NOTE ADULT - SUBJECTIVE AND OBJECTIVE BOX
Patient is a 59y old  Male who presents with a chief complaint of shortness of breath (19 Jun 2019 17:22)      Patient seen and examined at bedside.  Patient denies any chest pain and reports breathing is much better.    ALLERGIES:  No Known Allergies    MEDICATIONS:  acetaminophen   Tablet .. 650 milliGRAM(s) Oral every 6 hours PRN  ALBUTerol    90 MICROgram(s) HFA Inhaler 1 Puff(s) Inhalation every 4 hours  ALBUTerol/ipratropium for Nebulization 3 milliLiter(s) Nebulizer every 6 hours  benzonatate 100 milliGRAM(s) Oral every 8 hours PRN  famotidine    Tablet 20 milliGRAM(s) Oral daily  guaiFENesin    Syrup 200 milliGRAM(s) Oral every 6 hours PRN  methylPREDNISolone sodium succinate Injectable 40 milliGRAM(s) IV Push two times a day  ondansetron Injectable 4 milliGRAM(s) IV Push every 6 hours PRN  tiotropium 18 MICROgram(s) Capsule 1 Capsule(s) Inhalation daily    Vital Signs Last 24 Hrs  T(F): 97.8 (20 Jun 2019 07:38), Max: 98.6 (19 Jun 2019 17:40)  HR: 74 (20 Jun 2019 07:38) (74 - 120)  BP: 134/83 (20 Jun 2019 07:38) (134/83 - 159/86)  RR: 19 (20 Jun 2019 07:38) (16 - 19)  SpO2: 94% (20 Jun 2019 09:28) (90% - 98%)  I&O's Summary    19 Jun 2019 07:01  -  20 Jun 2019 07:00  --------------------------------------------------------  IN: 1950 mL / OUT: 800 mL / NET: 1150 mL        PHYSICAL EXAM:  General: NAD, A/O x 3  ENT: MMM  Neck: Supple, No JVD  Lungs: Good air entry, expiratory wheezes   Cardio: RRR, S1/S2, No murmurs  Abdomen: Soft, Nontender, Nondistended; obese  Extremities: No cyanosis, No edema    LABS:                        16.0   10.37 )-----------( 153      ( 20 Jun 2019 05:55 )             47.4     06-20    142  |  107  |  16  ----------------------------<  188  3.9   |  24  |  1.07    Ca    8.3      20 Jun 2019 05:55    TPro  8.2  /  Alb  4.3  /  TBili  1.3  /  DBili  x   /  AST  27  /  ALT  47  /  AlkPhos  49  06-19    eGFR if Non African American: 76 mL/min/1.73M2 (06-20-19 @ 05:55)  eGFR if African American: 88 mL/min/1.73M2 (06-20-19 @ 05:55)    PT/INR - ( 19 Jun 2019 08:15 )   PT: 12.2 sec;   INR: 1.09 ratio         PTT - ( 19 Jun 2019 08:15 )  PTT:31.5 sec    CARDIAC MARKERS ( 19 Jun 2019 12:00 )  <.017 ng/mL / x     / x     / x     / x      CARDIAC MARKERS ( 19 Jun 2019 08:15 )  .024 ng/mL / x     / x     / x     / x                ABG - ( 19 Jun 2019 09:03 )  pH, Arterial: 7.44  pH, Blood: x     /  pCO2: 36    /  pO2: 54    / HCO3: x     / Base Excess: x     /  SaO2: 89                CAPILLARY BLOOD GLUCOSE                RADIOLOGY & ADDITIONAL TESTS:    Care Discussed with Consultants/Other Providers:
Follow-up Pulm Progress Note    Stephon Tarango MD  [phone]    No new respiratory events overnight.  Denies SOB/CP.   Pt seen and examined at 9 am  Medications:  Vital Signs Last 24 Hrs  T(C): 36.6 (20 Jun 2019 07:38), Max: 37 (19 Jun 2019 17:40)  T(F): 97.8 (20 Jun 2019 07:38), Max: 98.6 (19 Jun 2019 17:40)  HR: 74 (20 Jun 2019 07:38) (74 - 120)  BP: 134/83 (20 Jun 2019 07:38) (134/83 - 153/95)  BP(mean): --  RR: 19 (20 Jun 2019 07:38) (16 - 19)  SpO2: 94% (20 Jun 2019 09:28) (90% - 98%)    ABG - ( 19 Jun 2019 09:03 )  pH, Arterial: 7.44  pH, Blood: x     /  pCO2: 36    /  pO2: 54    / HCO3: x     / Base Excess: x     /  SaO2: 89                    06-19 @ 07:01  -  06-20 @ 07:00  --------------------------------------------------------  IN: 1950 mL / OUT: 800 mL / NET: 1150 mL        LABS:                        16.0   10.37 )-----------( 153      ( 20 Jun 2019 05:55 )             47.4     06-20    142  |  107  |  16  ----------------------------<  188<H>  3.9   |  24  |  1.07    Ca    8.3<L>      20 Jun 2019 05:55    TPro  8.2  /  Alb  4.3  /  TBili  1.3<H>  /  DBili  x   /  AST  27  /  ALT  47<H>  /  AlkPhos  49  06-19      PT/INR - ( 19 Jun 2019 08:15 )   PT: 12.2 sec;   INR: 1.09 ratio         PTT - ( 19 Jun 2019 08:15 )  PTT:31.5 sec    Serum Pro-Brain Natriuretic Peptide: 26 pg/mL (06-19-19 @ 08:15)      CULTURES:        Physical Examination:  PULM: Clear to auscultation bilaterally, no significant sputum production  CVS: Regular rate and rhythm, no murmurs, rubs, or gallops  ABD: Soft, non-tender  EXT:  No clubbing, cyanosis, or edema

## 2019-06-21 LAB — HBA1C BLD-MCNC: 6.3 % — HIGH (ref 4–5.6)

## 2019-06-21 PROCEDURE — 36415 COLL VENOUS BLD VENIPUNCTURE: CPT

## 2019-06-21 PROCEDURE — 71275 CT ANGIOGRAPHY CHEST: CPT

## 2019-06-21 PROCEDURE — 87633 RESP VIRUS 12-25 TARGETS: CPT

## 2019-06-21 PROCEDURE — 83036 HEMOGLOBIN GLYCOSYLATED A1C: CPT

## 2019-06-21 PROCEDURE — 85027 COMPLETE CBC AUTOMATED: CPT

## 2019-06-21 PROCEDURE — 87581 M.PNEUMON DNA AMP PROBE: CPT

## 2019-06-21 PROCEDURE — 86803 HEPATITIS C AB TEST: CPT

## 2019-06-21 PROCEDURE — 87798 DETECT AGENT NOS DNA AMP: CPT

## 2019-06-21 PROCEDURE — 82803 BLOOD GASES ANY COMBINATION: CPT

## 2019-06-21 PROCEDURE — 71045 X-RAY EXAM CHEST 1 VIEW: CPT

## 2019-06-21 PROCEDURE — 83880 ASSAY OF NATRIURETIC PEPTIDE: CPT

## 2019-06-21 PROCEDURE — 85730 THROMBOPLASTIN TIME PARTIAL: CPT

## 2019-06-21 PROCEDURE — 85610 PROTHROMBIN TIME: CPT

## 2019-06-21 PROCEDURE — 94640 AIRWAY INHALATION TREATMENT: CPT

## 2019-06-21 PROCEDURE — 80048 BASIC METABOLIC PNL TOTAL CA: CPT

## 2019-06-21 PROCEDURE — 93005 ELECTROCARDIOGRAM TRACING: CPT

## 2019-06-21 PROCEDURE — 96375 TX/PRO/DX INJ NEW DRUG ADDON: CPT

## 2019-06-21 PROCEDURE — 96365 THER/PROPH/DIAG IV INF INIT: CPT | Mod: XU

## 2019-06-21 PROCEDURE — 87486 CHLMYD PNEUM DNA AMP PROBE: CPT

## 2019-06-21 PROCEDURE — 84484 ASSAY OF TROPONIN QUANT: CPT

## 2019-06-21 PROCEDURE — 99291 CRITICAL CARE FIRST HOUR: CPT | Mod: 25

## 2019-06-21 PROCEDURE — 85379 FIBRIN DEGRADATION QUANT: CPT

## 2019-06-21 PROCEDURE — 80053 COMPREHEN METABOLIC PANEL: CPT

## 2019-07-08 ENCOUNTER — APPOINTMENT (OUTPATIENT)
Dept: PULMONOLOGY | Facility: CLINIC | Age: 59
End: 2019-07-08
Payer: COMMERCIAL

## 2019-07-08 ENCOUNTER — LABORATORY RESULT (OUTPATIENT)
Age: 59
End: 2019-07-08

## 2019-07-08 VITALS
BODY MASS INDEX: 34.46 KG/M2 | TEMPERATURE: 97.6 F | HEIGHT: 73 IN | DIASTOLIC BLOOD PRESSURE: 70 MMHG | HEART RATE: 66 BPM | RESPIRATION RATE: 16 BRPM | SYSTOLIC BLOOD PRESSURE: 110 MMHG | OXYGEN SATURATION: 97 % | WEIGHT: 260 LBS

## 2019-07-08 DIAGNOSIS — Z82.49 FAMILY HISTORY OF ISCHEMIC HEART DISEASE AND OTHER DISEASES OF THE CIRCULATORY SYSTEM: ICD-10-CM

## 2019-07-08 PROCEDURE — 99205 OFFICE O/P NEW HI 60 MIN: CPT

## 2019-07-08 RX ORDER — ALBUTEROL SULFATE 108 UG/1
108 (90 BASE) AEROSOL, METERED RESPIRATORY (INHALATION)
Qty: 1 | Refills: 2 | Status: DISCONTINUED | COMMUNITY
Start: 2019-06-20 | End: 2019-07-08

## 2019-07-08 NOTE — PHYSICAL EXAM
[General Appearance - Well Developed] : well developed [Normal Appearance] : normal appearance [Well Groomed] : well groomed [General Appearance - Well Nourished] : well nourished [No Deformities] : no deformities [General Appearance - In No Acute Distress] : no acute distress [Normal Conjunctiva] : the conjunctiva exhibited no abnormalities [Eyelids - No Xanthelasma] : the eyelids demonstrated no xanthelasmas [Normal Oropharynx] : normal oropharynx [Neck Appearance] : the appearance of the neck was normal [Neck Cervical Mass (___cm)] : no neck mass was observed [Jugular Venous Distention Increased] : there was no jugular-venous distention [Thyroid Diffuse Enlargement] : the thyroid was not enlarged [Thyroid Nodule] : there were no palpable thyroid nodules [Heart Rate And Rhythm] : heart rate and rhythm were normal [Murmurs] : no murmurs present [Heart Sounds] : normal S1 and S2 [Respiration, Rhythm And Depth] : normal respiratory rhythm and effort [Auscultation Breath Sounds / Voice Sounds] : lungs were clear to auscultation bilaterally [Exaggerated Use Of Accessory Muscles For Inspiration] : no accessory muscle use [Abnormal Walk] : normal gait [Nail Clubbing] : no clubbing of the fingernails [Cyanosis, Localized] : no localized cyanosis [Petechial Hemorrhages (___cm)] : no petechial hemorrhages [] : no ischemic changes

## 2019-07-12 LAB — TOTAL IGE SMQN RAST: 2919 KU/L

## 2019-07-17 LAB
A ALTERNATA IGE QN: 0.11 KUA/L
A FUMIGATUS IGE QN: 0.14 KUA/L
BERMUDA GRASS IGE QN: 0.16 KUA/L
BOXELDER IGE QN: 0.46 KUA/L
C HERBARUM IGE QN: <0.1 KUA/L
CALIF WALNUT IGE QN: NORMAL
CAT DANDER IGE QN: 6.1 KUA/L
CMN PIGWEED IGE QN: 0.97 KUA/L
COMMON RAGWEED IGE QN: 1.89 KUA/L
COTTONWOOD IGE QN: 0.55 KUA/L
D FARINAE IGE QN: 26.5 KUA/L
D PTERONYSS IGE QN: 14.8 KUA/L
DEPRECATED A ALTERNATA IGE RAST QL: NORMAL
DEPRECATED A FUMIGATUS IGE RAST QL: NORMAL
DEPRECATED BERMUDA GRASS IGE RAST QL: NORMAL
DEPRECATED BOXELDER IGE RAST QL: 1
DEPRECATED C HERBARUM IGE RAST QL: 0
DEPRECATED CAT DANDER IGE RAST QL: 3
DEPRECATED COMMON PIGWEED IGE RAST QL: 2
DEPRECATED COMMON RAGWEED IGE RAST QL: 2
DEPRECATED COTTONWOOD IGE RAST QL: 1
DEPRECATED D FARINAE IGE RAST QL: 4
DEPRECATED D PTERONYSS IGE RAST QL: 3
DEPRECATED DOG DANDER IGE RAST QL: 5
DEPRECATED GOOSEFOOT IGE RAST QL: NORMAL
DEPRECATED LONDON PLANE IGE RAST QL: NORMAL
DEPRECATED MUGWORT IGE RAST QL: NORMAL
DEPRECATED P NOTATUM IGE RAST QL: 0
DEPRECATED RED CEDAR IGE RAST QL: NORMAL
DEPRECATED ROACH IGE RAST QL: 3
DEPRECATED SHEEP SORREL IGE RAST QL: NORMAL
DEPRECATED SILVER BIRCH IGE RAST QL: 3
DEPRECATED TIMOTHY IGE RAST QL: NORMAL
DEPRECATED WHITE ASH IGE RAST QL: NORMAL
DEPRECATED WHITE OAK IGE RAST QL: 3
DOG DANDER IGE QN: 95.1 KUA/L
GOOSEFOOT IGE QN: 0.31 KUA/L
LONDON PLANE IGE QN: NORMAL
MUGWORT IGE QN: 0.26 KUA/L
MULBERRY (T70) CLASS: NORMAL
MULBERRY (T70) CONC: NORMAL
P NOTATUM IGE QN: <0.1 KUA/L
RED CEDAR IGE QN: NORMAL
ROACH IGE QN: 8.62 KUA/L
SHEEP SORREL IGE QN: NORMAL
SILVER BIRCH IGE QN: 15.7 KUA/L
TIMOTHY IGE QN: NORMAL
TREE ALLERG MIX1 IGE QL: NORMAL
WHITE ASH IGE QN: NORMAL
WHITE ELM IGE QN: 2
WHITE ELM IGE QN: 2.21 KUA/L
WHITE OAK IGE QN: 11.7 KUA/L

## 2019-09-18 ENCOUNTER — OUTPATIENT (OUTPATIENT)
Dept: OUTPATIENT SERVICES | Facility: HOSPITAL | Age: 59
LOS: 1 days | End: 2019-09-18
Payer: COMMERCIAL

## 2019-09-18 DIAGNOSIS — J45.20 MILD INTERMITTENT ASTHMA, UNCOMPLICATED: ICD-10-CM

## 2019-09-18 PROCEDURE — 94726 PLETHYSMOGRAPHY LUNG VOLUMES: CPT | Mod: 26

## 2019-09-18 PROCEDURE — 94060 EVALUATION OF WHEEZING: CPT | Mod: 26

## 2019-09-18 PROCEDURE — 94060 EVALUATION OF WHEEZING: CPT

## 2019-09-18 PROCEDURE — 94726 PLETHYSMOGRAPHY LUNG VOLUMES: CPT

## 2019-09-18 PROCEDURE — 94729 DIFFUSING CAPACITY: CPT | Mod: 26

## 2019-09-18 PROCEDURE — 94729 DIFFUSING CAPACITY: CPT

## 2019-09-18 PROCEDURE — 94640 AIRWAY INHALATION TREATMENT: CPT

## 2019-09-18 RX ORDER — ALBUTEROL 90 UG/1
2.5 AEROSOL, METERED ORAL ONCE
Refills: 0 | Status: COMPLETED | OUTPATIENT
Start: 2019-09-18 | End: 2019-09-18

## 2019-09-18 RX ADMIN — ALBUTEROL 2.5 MILLIGRAM(S): 90 AEROSOL, METERED ORAL at 09:40

## 2019-10-16 NOTE — ED ADULT TRIAGE NOTE - NS ED NURSE BANDS TYPE
Called and spoke to patient informed him to increase lantus by 2 units and call in 1 week with blood sugar readings. Patient declined diabetic educator referral, states he has seen her before and his sister is a diabetic educator for [employer].    Name band;

## 2020-06-29 ENCOUNTER — RX RENEWAL (OUTPATIENT)
Age: 60
End: 2020-06-29

## 2020-09-20 ENCOUNTER — TRANSCRIPTION ENCOUNTER (OUTPATIENT)
Age: 60
End: 2020-09-20

## 2021-03-26 ENCOUNTER — APPOINTMENT (OUTPATIENT)
Dept: FAMILY MEDICINE | Facility: CLINIC | Age: 61
End: 2021-03-26
Payer: MEDICAID

## 2021-03-26 VITALS
HEIGHT: 73 IN | DIASTOLIC BLOOD PRESSURE: 80 MMHG | OXYGEN SATURATION: 96 % | RESPIRATION RATE: 15 BRPM | SYSTOLIC BLOOD PRESSURE: 120 MMHG | WEIGHT: 252 LBS | BODY MASS INDEX: 33.4 KG/M2 | HEART RATE: 81 BPM | TEMPERATURE: 97.4 F

## 2021-03-26 DIAGNOSIS — Z82.5 FAMILY HISTORY OF ASTHMA AND OTHER CHRONIC LOWER RESPIRATORY DISEASES: ICD-10-CM

## 2021-03-26 DIAGNOSIS — Z86.39 PERSONAL HISTORY OF OTHER ENDOCRINE, NUTRITIONAL AND METABOLIC DISEASE: ICD-10-CM

## 2021-03-26 DIAGNOSIS — Z78.9 OTHER SPECIFIED HEALTH STATUS: ICD-10-CM

## 2021-03-26 DIAGNOSIS — Z23 ENCOUNTER FOR IMMUNIZATION: ICD-10-CM

## 2021-03-26 DIAGNOSIS — Z84.1 FAMILY HISTORY OF DISORDERS OF KIDNEY AND URETER: ICD-10-CM

## 2021-03-26 PROCEDURE — 99204 OFFICE O/P NEW MOD 45 MIN: CPT

## 2021-03-26 PROCEDURE — 99072 ADDL SUPL MATRL&STAF TM PHE: CPT

## 2021-03-26 NOTE — HISTORY OF PRESENT ILLNESS
[FreeTextEntry8] : establish care, concerns for diabetes, urinary frequency, no thirst issues, fasting glucose 270\par \par Mr Jerry Summers (CROW) is a 60 yo male presents today to establish care. Pt reports past hx of elevated glucose, at one time, was severely high when he was hospitalized. Pt was prescribed insulin at one point, was also following up with endocrinologist. Pt reports since then his glucose numbers had improved after which he was no longer taking the medication as well as was not following up with endocrinologist any more. 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 Was notified this Feb 2021 at physical about uncontrolled DMII, HTN, states he was more stressed and never followed up. Pt reports lately increased frequency of urination, denies thirst, check a random glucose this morning was 270s however states had some grapes prior to that. Pt denies knowing any one in his family with history of DMII.

## 2021-03-26 NOTE — PHYSICAL EXAM
[No Acute Distress] : no acute distress [EOMI] : extraocular movements intact [Normal Oropharynx] : the oropharynx was normal [Supple] : supple [Clear to Auscultation] : lungs were clear to auscultation bilaterally [Normal S1, S2] : normal S1 and S2 [Non Tender] : non-tender [No Rash] : no rash [Normal Gait] : normal gait [Normal Affect] : the affect was normal [de-identified] : obese

## 2021-03-26 NOTE — ASSESSMENT
[FreeTextEntry1] : Lab script for blood work and urinalysis ordered today, will follow up accordingly.\par advised low carb diet, avoid concentrated sweets, exercise, weight loss\par ordered glucometer kit\par advised to keep a log of daily fasting blood glucose\par bring to next visit\par RTO for CPE exam in 2 weeks. \par

## 2021-03-26 NOTE — REVIEW OF SYSTEMS
[Dysuria] : no dysuria [Incontinence] : no incontinence [Hesitancy] : no hesitancy [Nocturia] : no nocturia [Hematuria] : no hematuria [Frequency] : frequency [Negative] : Heme/Lymph

## 2021-03-26 NOTE — HEALTH RISK ASSESSMENT
[] : No [Yes] : Yes [Monthly or less (1 pt)] : Monthly or less (1 point) [1 or 2 (0 pts)] : 1 or 2 (0 points) [Never (0 pts)] : Never (0 points) [No] : In the past 12 months have you used drugs other than those required for medical reasons? No [No falls in past year] : Patient reported no falls in the past year [0] : 2) Feeling down, depressed, or hopeless: Not at all (0) [de-identified] : rare

## 2021-03-29 LAB
25(OH)D3 SERPL-MCNC: 19 NG/ML
ALBUMIN SERPL ELPH-MCNC: 4.7 G/DL
ALP BLD-CCNC: 53 U/L
ALT SERPL-CCNC: 49 U/L
ANION GAP SERPL CALC-SCNC: 14 MMOL/L
APPEARANCE: CLEAR
AST SERPL-CCNC: 23 U/L
BASOPHILS # BLD AUTO: 0.04 K/UL
BASOPHILS NFR BLD AUTO: 0.7 %
BILIRUB SERPL-MCNC: 1.2 MG/DL
BILIRUBIN URINE: NEGATIVE
BLOOD URINE: NEGATIVE
BUN SERPL-MCNC: 19 MG/DL
CALCIUM SERPL-MCNC: 9.8 MG/DL
CHLORIDE SERPL-SCNC: 100 MMOL/L
CHOLEST SERPL-MCNC: 190 MG/DL
CO2 SERPL-SCNC: 26 MMOL/L
COLOR: YELLOW
CREAT SERPL-MCNC: 1.09 MG/DL
EOSINOPHIL # BLD AUTO: 0.14 K/UL
EOSINOPHIL NFR BLD AUTO: 2.3 %
ESTIMATED AVERAGE GLUCOSE: 226 MG/DL
FOLATE SERPL-MCNC: 18.2 NG/ML
GLUCOSE QUALITATIVE U: ABNORMAL
GLUCOSE SERPL-MCNC: 362 MG/DL
HBA1C MFR BLD HPLC: 9.5 %
HCT VFR BLD CALC: 46.9 %
HCV AB SER QL: NONREACTIVE
HCV S/CO RATIO: 0.06 S/CO
HDLC SERPL-MCNC: 38 MG/DL
HGB BLD-MCNC: 16.8 G/DL
HIV1+2 AB SPEC QL IA.RAPID: NONREACTIVE
IMM GRANULOCYTES NFR BLD AUTO: 0.3 %
KETONES URINE: ABNORMAL
LDLC SERPL CALC-MCNC: 102 MG/DL
LDLC SERPL DIRECT ASSAY-MCNC: 124 MG/DL
LEUKOCYTE ESTERASE URINE: NEGATIVE
LYMPHOCYTES # BLD AUTO: 1.69 K/UL
LYMPHOCYTES NFR BLD AUTO: 27.6 %
MAN DIFF?: NORMAL
MCHC RBC-ENTMCNC: 32.1 PG
MCHC RBC-ENTMCNC: 35.8 GM/DL
MCV RBC AUTO: 89.5 FL
MONOCYTES # BLD AUTO: 0.51 K/UL
MONOCYTES NFR BLD AUTO: 8.3 %
NEUTROPHILS # BLD AUTO: 3.73 K/UL
NEUTROPHILS NFR BLD AUTO: 60.8 %
NITRITE URINE: NEGATIVE
NONHDLC SERPL-MCNC: 152 MG/DL
PH URINE: 6
PLATELET # BLD AUTO: 138 K/UL
POTASSIUM SERPL-SCNC: 5.3 MMOL/L
PROT SERPL-MCNC: 7.3 G/DL
PROTEIN URINE: NORMAL
PSA SERPL-MCNC: 2.02 NG/ML
RBC # BLD: 5.24 M/UL
RBC # FLD: 12.8 %
SODIUM SERPL-SCNC: 140 MMOL/L
SPECIFIC GRAVITY URINE: 1.04
T4 FREE SERPL-MCNC: 1.6 NG/DL
TRIGL SERPL-MCNC: 250 MG/DL
TSH SERPL-ACNC: 0.64 UIU/ML
UROBILINOGEN URINE: NORMAL
VIT B12 SERPL-MCNC: 430 PG/ML
WBC # FLD AUTO: 6.13 K/UL

## 2021-04-06 ENCOUNTER — NON-APPOINTMENT (OUTPATIENT)
Age: 61
End: 2021-04-06

## 2021-04-06 ENCOUNTER — APPOINTMENT (OUTPATIENT)
Dept: FAMILY MEDICINE | Facility: CLINIC | Age: 61
End: 2021-04-06
Payer: MEDICAID

## 2021-04-06 VITALS
HEIGHT: 73 IN | BODY MASS INDEX: 33.13 KG/M2 | DIASTOLIC BLOOD PRESSURE: 80 MMHG | OXYGEN SATURATION: 98 % | HEART RATE: 71 BPM | WEIGHT: 250 LBS | SYSTOLIC BLOOD PRESSURE: 120 MMHG | RESPIRATION RATE: 16 BRPM | TEMPERATURE: 97.2 F

## 2021-04-06 PROCEDURE — 99396 PREV VISIT EST AGE 40-64: CPT | Mod: 25

## 2021-04-06 PROCEDURE — 93000 ELECTROCARDIOGRAM COMPLETE: CPT

## 2021-04-06 PROCEDURE — 99072 ADDL SUPL MATRL&STAF TM PHE: CPT

## 2021-04-13 ENCOUNTER — APPOINTMENT (OUTPATIENT)
Dept: FAMILY MEDICINE | Facility: CLINIC | Age: 61
End: 2021-04-13

## 2021-04-29 RX ORDER — EMPAGLIFLOZIN 10 MG/1
10 TABLET, FILM COATED ORAL DAILY
Qty: 30 | Refills: 2 | Status: DISCONTINUED | COMMUNITY
Start: 2021-03-29 | End: 2021-04-29

## 2021-05-04 DIAGNOSIS — B37.9 CANDIDIASIS, UNSPECIFIED: ICD-10-CM

## 2021-05-08 ENCOUNTER — APPOINTMENT (OUTPATIENT)
Dept: ULTRASOUND IMAGING | Facility: HOSPITAL | Age: 61
End: 2021-05-08

## 2021-05-10 ENCOUNTER — TRANSCRIPTION ENCOUNTER (OUTPATIENT)
Age: 61
End: 2021-05-10

## 2021-05-20 ENCOUNTER — TRANSCRIPTION ENCOUNTER (OUTPATIENT)
Age: 61
End: 2021-05-20

## 2021-07-08 ENCOUNTER — APPOINTMENT (OUTPATIENT)
Dept: FAMILY MEDICINE | Facility: CLINIC | Age: 61
End: 2021-07-08
Payer: MEDICAID

## 2021-07-08 VITALS
HEART RATE: 85 BPM | SYSTOLIC BLOOD PRESSURE: 122 MMHG | OXYGEN SATURATION: 97 % | BODY MASS INDEX: 34.33 KG/M2 | WEIGHT: 259 LBS | RESPIRATION RATE: 15 BRPM | DIASTOLIC BLOOD PRESSURE: 70 MMHG | HEIGHT: 73 IN | TEMPERATURE: 97.3 F

## 2021-07-08 DIAGNOSIS — Z00.00 ENCOUNTER FOR GENERAL ADULT MEDICAL EXAMINATION W/OUT ABNORMAL FINDINGS: ICD-10-CM

## 2021-07-08 PROCEDURE — 99215 OFFICE O/P EST HI 40 MIN: CPT

## 2021-07-08 NOTE — PHYSICAL EXAM
[No Acute Distress] : no acute distress [EOMI] : extraocular movements intact [Normal Oropharynx] : the oropharynx was normal [Supple] : supple [Clear to Auscultation] : lungs were clear to auscultation bilaterally [Normal S1, S2] : normal S1 and S2 [Non Tender] : non-tender [No Rash] : no rash [Normal Gait] : normal gait [Normal Affect] : the affect was normal [de-identified] : obese [de-identified] : right mandibular angle mass

## 2021-07-08 NOTE — ASSESSMENT
[FreeTextEntry1] : complete physical exam, blood work and urinalysis reviewed today\par started on Metformin and Jardiance daily, advised to avoid concentrated sweets, low carb diet, exercise weight loss, rto in 3 month blood work recheck, keep blood glucose check daily\par follow up with pulmonary accordingly, hx of asthma controlled, discuss need for Pneumovax 23\par temporarily refused Tdap, Pneumovax 23, shingrix vaccine\par up to date with Pfizer Covid19 vaccine\par referral provided to GI for colonoscopy\par referral provide to opthalmology and podiatrist for diabetes eye/foot exam\par US of neck mass, Mandibular angle mass, lymph node? lipoma? \par

## 2021-07-08 NOTE — HISTORY OF PRESENT ILLNESS
[FreeTextEntry1] : comprehensive [de-identified] : Mr Jerry Summers (Tom) is a 60 yo male presents today for comprehensive visit. Pt labwork reviewed, HgbA1c 9.5, will now provide both Metformin and Jardiance, advised to take regularly, will in recheck in 3 months. Advised limit concentrated sweets, exercise, weight loss, low carb diet. Lipid panel elevated will recommended lifestyle modification for now, recheck in 3 months. vit d supplementation also started. Advised to continue with daily fasting blood glucose monitoring, with goal of <140 preprandial, goal of HgbA1c of <7. \par On the side note, pt reports a mass on right neck, been there about a year, size of quarter, round, non painful, denies any fever, chills, unintentional weight loss. Advised will get an US of the area of concern.

## 2021-07-08 NOTE — HISTORY OF PRESENT ILLNESS
[FreeTextEntry1] : follow up, diabetes type 2, mass on right side of neck, hld [de-identified] : Mr Jerry Summers is a 62 yo male presents today for routine follow up and labwork. Pt reports home glucose reading around 125-135 preprandial. Pt reports he is now compliant on metformin and pioglitazone. Attempting to limit high carb diet. Pt advised today again importance of further weight loss. Pt also recommended today with his history of DMII and high ASCVD score, a statin medication to lower LDL < 70. Discussed risks and benefits of statin medications. Pt states would like to discuss with a cardiologist. Pt yet to follow up with ophthalmology and podiatrist for diabetic eye and foot exam. Pt offerred Pneumovax 23, Tdap, Shingrix, differed for today. Pt also yet to acquire US of right side of neck, states swelling better today.

## 2021-07-08 NOTE — PHYSICAL EXAM
[No Acute Distress] : no acute distress [EOMI] : extraocular movements intact [Normal Oropharynx] : the oropharynx was normal [Supple] : supple [Clear to Auscultation] : lungs were clear to auscultation bilaterally [Normal S1, S2] : normal S1 and S2 [Non Tender] : non-tender [No Rash] : no rash [Normal Gait] : normal gait [Normal Affect] : the affect was normal [de-identified] : obese

## 2021-07-08 NOTE — HEALTH RISK ASSESSMENT
[Good] : ~his/her~  mood as  good [Yes] : Yes [Monthly or less (1 pt)] : Monthly or less (1 point) [1 or 2 (0 pts)] : 1 or 2 (0 points) [Never (0 pts)] : Never (0 points) [No] : In the past 12 months have you used drugs other than those required for medical reasons? No [No falls in past year] : Patient reported no falls in the past year [0] : 2) Feeling down, depressed, or hopeless: Not at all (0) [Patient declined colonoscopy] : Patient declined colonoscopy [HIV Test offered] : HIV Test offered [Hepatitis C test offered] : Hepatitis C test offered [None] : None [With Family] : lives with family [# of Members in Household ___] :  household currently consist of [unfilled] member(s) [College] : College [] :  [# Of Children ___] : has [unfilled] children [Feels Safe at Home] : Feels safe at home [Fully functional (bathing, dressing, toileting, transferring, walking, feeding)] : Fully functional (bathing, dressing, toileting, transferring, walking, feeding) [Fully functional (using the telephone, shopping, preparing meals, housekeeping, doing laundry, using] : Fully functional and needs no help or supervision to perform IADLs (using the telephone, shopping, preparing meals, housekeeping, doing laundry, using transportation, managing medications and managing finances) [Smoke Detector] : smoke detector [Carbon Monoxide Detector] : carbon monoxide detector [Safety elements used in home] : safety elements used in home [Seat Belt] :  uses seat belt [Sunscreen] : uses sunscreen [Reviewed no changes] : Reviewed no changes [Name: ___] : Health Care Proxy's Name: [unfilled]  [Relationship: ___] : Relationship: [unfilled] [Aggressive treatment] : aggressive treatment [] : No [de-identified] : rarely alcohol [Change in mental status noted] : No change in mental status noted [Language] : denies difficulty with language [Behavior] : denies difficulty with behavior [Learning/Retaining New Information] : denies difficulty learning/retaining new information [Handling Complex Tasks] : denies difficulty handling complex tasks [Reasoning] : denies difficulty with reasoning [Spatial Ability and Orientation] : denies difficulty with spatial ability and orientation [Sexually Active] : not sexually active [High Risk Behavior] : no high risk behavior [Reports changes in hearing] : Reports no changes in hearing [Reports changes in vision] : Reports no changes in vision [Reports changes in dental health] : Reports no changes in dental health [Guns at Home] : no guns at home [ColonoscopyComments] : gi referral for colonoscopy provided today [HIVDate] : 03/2021 [HIVComments] : negative [HepatitisCDate] : 03/2021 [HepatitisCComments] : negative [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

## 2021-07-08 NOTE — ASSESSMENT
[FreeTextEntry1] : follow up labs collected today\par referral provided to cardiology for evaluation, discuss statin medications\par advised importance of medication compliance, low carb diet, avoid concentrated sweets, exercise, weight loss\par advised importance of following up with opthalmology and podiatrist, for diabetic eye and foot exam\par offerred Pneumovax 23, Tdap, Shingrix, deferred to next visit.

## 2021-07-13 LAB
ALBUMIN SERPL ELPH-MCNC: 4.5 G/DL
ALP BLD-CCNC: 38 U/L
ALT SERPL-CCNC: 29 U/L
ANION GAP SERPL CALC-SCNC: 16 MMOL/L
AST SERPL-CCNC: 20 U/L
BASOPHILS # BLD AUTO: 0.06 K/UL
BASOPHILS NFR BLD AUTO: 1.1 %
BILIRUB SERPL-MCNC: 0.7 MG/DL
BUN SERPL-MCNC: 23 MG/DL
CALCIUM SERPL-MCNC: 9.3 MG/DL
CHLORIDE SERPL-SCNC: 104 MMOL/L
CHOLEST SERPL-MCNC: 157 MG/DL
CO2 SERPL-SCNC: 24 MMOL/L
CREAT SERPL-MCNC: 1.1 MG/DL
CREAT SPEC-SCNC: 105 MG/DL
EOSINOPHIL # BLD AUTO: 0.15 K/UL
EOSINOPHIL NFR BLD AUTO: 2.6 %
ESTIMATED AVERAGE GLUCOSE: 148 MG/DL
GLUCOSE SERPL-MCNC: 132 MG/DL
HBA1C MFR BLD HPLC: 6.8 %
HCT VFR BLD CALC: 49.2 %
HDLC SERPL-MCNC: 39 MG/DL
HGB BLD-MCNC: 15.9 G/DL
IMM GRANULOCYTES NFR BLD AUTO: 0.5 %
LDLC SERPL CALC-MCNC: 81 MG/DL
LDLC SERPL DIRECT ASSAY-MCNC: 89 MG/DL
LYMPHOCYTES # BLD AUTO: 1.95 K/UL
LYMPHOCYTES NFR BLD AUTO: 34.4 %
MAN DIFF?: NORMAL
MCHC RBC-ENTMCNC: 29.6 PG
MCHC RBC-ENTMCNC: 32.3 GM/DL
MCV RBC AUTO: 91.6 FL
MICROALBUMIN 24H UR DL<=1MG/L-MCNC: <1.2 MG/DL
MICROALBUMIN/CREAT 24H UR-RTO: NORMAL MG/G
MONOCYTES # BLD AUTO: 0.48 K/UL
MONOCYTES NFR BLD AUTO: 8.5 %
NEUTROPHILS # BLD AUTO: 3 K/UL
NEUTROPHILS NFR BLD AUTO: 52.9 %
NONHDLC SERPL-MCNC: 118 MG/DL
PLATELET # BLD AUTO: 162 K/UL
POTASSIUM SERPL-SCNC: 4.5 MMOL/L
PROT SERPL-MCNC: 6.8 G/DL
RBC # BLD: 5.37 M/UL
RBC # FLD: 12.9 %
SODIUM SERPL-SCNC: 143 MMOL/L
T4 FREE SERPL-MCNC: 1.4 NG/DL
TRIGL SERPL-MCNC: 186 MG/DL
TSH SERPL-ACNC: 1.53 UIU/ML
WBC # FLD AUTO: 5.67 K/UL

## 2021-08-30 ENCOUNTER — RX RENEWAL (OUTPATIENT)
Age: 61
End: 2021-08-30

## 2022-01-04 ENCOUNTER — RX RENEWAL (OUTPATIENT)
Age: 62
End: 2022-01-04

## 2022-01-06 RX ORDER — METFORMIN HYDROCHLORIDE 1000 MG/1
1000 TABLET, COATED ORAL
Qty: 180 | Refills: 3 | Status: DISCONTINUED | COMMUNITY
Start: 2021-03-29 | End: 2022-01-06

## 2022-01-10 ENCOUNTER — APPOINTMENT (OUTPATIENT)
Dept: FAMILY MEDICINE | Facility: CLINIC | Age: 62
End: 2022-01-10
Payer: MEDICAID

## 2022-01-10 VITALS
HEART RATE: 107 BPM | TEMPERATURE: 97.5 F | WEIGHT: 266 LBS | RESPIRATION RATE: 15 BRPM | SYSTOLIC BLOOD PRESSURE: 120 MMHG | DIASTOLIC BLOOD PRESSURE: 80 MMHG | OXYGEN SATURATION: 96 % | BODY MASS INDEX: 35.25 KG/M2 | HEIGHT: 73 IN

## 2022-01-10 DIAGNOSIS — J45.20 MILD INTERMITTENT ASTHMA, UNCOMPLICATED: ICD-10-CM

## 2022-01-10 PROCEDURE — 99214 OFFICE O/P EST MOD 30 MIN: CPT

## 2022-01-10 NOTE — PHYSICAL EXAM
[No Acute Distress] : no acute distress [EOMI] : extraocular movements intact [Normal Oropharynx] : the oropharynx was normal [Supple] : supple [Clear to Auscultation] : lungs were clear to auscultation bilaterally [Normal S1, S2] : normal S1 and S2 [Non Tender] : non-tender [No Rash] : no rash [Normal Gait] : normal gait [Normal Affect] : the affect was normal [de-identified] : obese

## 2022-01-10 NOTE — HISTORY OF PRESENT ILLNESS
[FreeTextEntry1] : follow up. not tolerating metformin, urinary frequency, nocturia [de-identified] : Mr Jerry Summers is a 62 yo male presents today for routine follow up and labwork. Pt reports home glucose reading around 361 preprandial. Pt reports not tolerating metformin now discontinued advised to continue on pioglitazone 30mg daily and added Jardiance. Advised to limit high carb diet. Pt advised today again importance of further weight loss. Pt also recommended today with his history of DMII and high ASCVD score, a statin medication to lower LDL < 70. Now also started on statin therapy. Pt reports followed up with ophthalmology and now will follow up with podiatrist for diabetic eye and foot exam. Pt offered Pneumovax 23, Tdap, Shingrix, deffered for today. Pt was prescribed a script for US for the right neck mass, states did not get opportunity to complete it yet. Advised to follow up accordingly. \par Pt has health concern today, reports lately more urinary urgency and frequency, waking up almost 8 times a night, for over 3 weeks, Denies fever, chills, nausea, vomiting, diarrhea,

## 2022-01-10 NOTE — ASSESSMENT
[FreeTextEntry1] : reports urinary frequency and urgency, will check UA/UC\par likely prostate, bph, will attempt a trial of flomax. \par Held metformin, reports not tolerating medication\par increase pioglitazone to 50mg daily and add Jardiance\par Repeat lab script to be done in next 3-4 weeks. \par Advised low carb diet, exercise, weight loss\par had provided US of cervical region, reported right neck mass, still not completed\par advised to get it done accordingly.

## 2022-01-11 LAB
APPEARANCE: CLEAR
BILIRUBIN URINE: NEGATIVE
BLOOD URINE: NEGATIVE
COLOR: NORMAL
GLUCOSE QUALITATIVE U: ABNORMAL
KETONES URINE: ABNORMAL
LEUKOCYTE ESTERASE URINE: NEGATIVE
NITRITE URINE: NEGATIVE
PH URINE: 5
PROTEIN URINE: NEGATIVE
SPECIFIC GRAVITY URINE: 1.04
UROBILINOGEN URINE: NORMAL

## 2022-01-15 ENCOUNTER — EMERGENCY (EMERGENCY)
Facility: HOSPITAL | Age: 62
LOS: 1 days | Discharge: HOME | End: 2022-01-15
Attending: EMERGENCY MEDICINE | Admitting: EMERGENCY MEDICINE
Payer: MEDICAID

## 2022-01-15 VITALS
HEIGHT: 73 IN | HEART RATE: 102 BPM | DIASTOLIC BLOOD PRESSURE: 86 MMHG | RESPIRATION RATE: 16 BRPM | TEMPERATURE: 97 F | WEIGHT: 266.1 LBS | OXYGEN SATURATION: 100 % | SYSTOLIC BLOOD PRESSURE: 129 MMHG

## 2022-01-15 VITALS
HEART RATE: 100 BPM | DIASTOLIC BLOOD PRESSURE: 85 MMHG | SYSTOLIC BLOOD PRESSURE: 118 MMHG | RESPIRATION RATE: 16 BRPM | OXYGEN SATURATION: 100 %

## 2022-01-15 LAB
ACETONE SERPL-MCNC: ABNORMAL
ALBUMIN SERPL ELPH-MCNC: 4.7 G/DL — SIGNIFICANT CHANGE UP (ref 3.3–5)
ALP SERPL-CCNC: 59 U/L — SIGNIFICANT CHANGE UP (ref 40–120)
ALT FLD-CCNC: 51 U/L — HIGH (ref 10–45)
ANION GAP SERPL CALC-SCNC: 13 MMOL/L — SIGNIFICANT CHANGE UP (ref 5–17)
APPEARANCE UR: CLEAR — SIGNIFICANT CHANGE UP
AST SERPL-CCNC: 20 U/L — SIGNIFICANT CHANGE UP (ref 10–40)
BACTERIA # UR AUTO: ABNORMAL /HPF
BASOPHILS # BLD AUTO: 0.06 K/UL — SIGNIFICANT CHANGE UP (ref 0–0.2)
BASOPHILS NFR BLD AUTO: 0.8 % — SIGNIFICANT CHANGE UP (ref 0–2)
BILIRUB SERPL-MCNC: 1.1 MG/DL — SIGNIFICANT CHANGE UP (ref 0.2–1.2)
BILIRUB UR-MCNC: NEGATIVE — SIGNIFICANT CHANGE UP
BUN SERPL-MCNC: 22 MG/DL — SIGNIFICANT CHANGE UP (ref 7–23)
CALCIUM SERPL-MCNC: 9.9 MG/DL — SIGNIFICANT CHANGE UP (ref 8.4–10.5)
CHLORIDE SERPL-SCNC: 102 MMOL/L — SIGNIFICANT CHANGE UP (ref 96–108)
CO2 SERPL-SCNC: 27 MMOL/L — SIGNIFICANT CHANGE UP (ref 22–31)
COLOR SPEC: YELLOW — SIGNIFICANT CHANGE UP
CREAT SERPL-MCNC: 1.49 MG/DL — HIGH (ref 0.5–1.3)
DIFF PNL FLD: NEGATIVE — SIGNIFICANT CHANGE UP
EOSINOPHIL # BLD AUTO: 0.07 K/UL — SIGNIFICANT CHANGE UP (ref 0–0.5)
EOSINOPHIL NFR BLD AUTO: 1 % — SIGNIFICANT CHANGE UP (ref 0–6)
EPI CELLS # UR: SIGNIFICANT CHANGE UP
GLUCOSE BLDC GLUCOMTR-MCNC: 220 MG/DL — HIGH (ref 70–99)
GLUCOSE BLDC GLUCOMTR-MCNC: 323 MG/DL — HIGH (ref 70–99)
GLUCOSE BLDC GLUCOMTR-MCNC: 433 MG/DL — HIGH (ref 70–99)
GLUCOSE SERPL-MCNC: 460 MG/DL — CRITICAL HIGH (ref 70–99)
GLUCOSE UR QL: 1000 MG/DL
HCT VFR BLD CALC: 52.8 % — HIGH (ref 39–50)
HGB BLD-MCNC: 18.2 G/DL — HIGH (ref 13–17)
IMM GRANULOCYTES NFR BLD AUTO: 0.7 % — SIGNIFICANT CHANGE UP (ref 0–1.5)
KETONES UR-MCNC: ABNORMAL
LEUKOCYTE ESTERASE UR-ACNC: ABNORMAL
LYMPHOCYTES # BLD AUTO: 1.75 K/UL — SIGNIFICANT CHANGE UP (ref 1–3.3)
LYMPHOCYTES # BLD AUTO: 24.4 % — SIGNIFICANT CHANGE UP (ref 13–44)
MAGNESIUM SERPL-MCNC: 2.6 MG/DL — SIGNIFICANT CHANGE UP (ref 1.6–2.6)
MCHC RBC-ENTMCNC: 30.6 PG — SIGNIFICANT CHANGE UP (ref 27–34)
MCHC RBC-ENTMCNC: 34.5 GM/DL — SIGNIFICANT CHANGE UP (ref 32–36)
MCV RBC AUTO: 88.9 FL — SIGNIFICANT CHANGE UP (ref 80–100)
MONOCYTES # BLD AUTO: 0.6 K/UL — SIGNIFICANT CHANGE UP (ref 0–0.9)
MONOCYTES NFR BLD AUTO: 8.4 % — SIGNIFICANT CHANGE UP (ref 2–14)
NEUTROPHILS # BLD AUTO: 4.64 K/UL — SIGNIFICANT CHANGE UP (ref 1.8–7.4)
NEUTROPHILS NFR BLD AUTO: 64.7 % — SIGNIFICANT CHANGE UP (ref 43–77)
NITRITE UR-MCNC: NEGATIVE — SIGNIFICANT CHANGE UP
NRBC # BLD: 0 /100 WBCS — SIGNIFICANT CHANGE UP (ref 0–0)
PH UR: 6 — SIGNIFICANT CHANGE UP (ref 5–8)
PHOSPHATE SERPL-MCNC: 5.4 MG/DL — HIGH (ref 2.5–4.5)
PLATELET # BLD AUTO: 175 K/UL — SIGNIFICANT CHANGE UP (ref 150–400)
POTASSIUM SERPL-MCNC: 4.1 MMOL/L — SIGNIFICANT CHANGE UP (ref 3.5–5.3)
POTASSIUM SERPL-SCNC: 4.1 MMOL/L — SIGNIFICANT CHANGE UP (ref 3.5–5.3)
PROT SERPL-MCNC: 8.2 G/DL — SIGNIFICANT CHANGE UP (ref 6–8.3)
PROT UR-MCNC: NEGATIVE — SIGNIFICANT CHANGE UP
RBC # BLD: 5.94 M/UL — HIGH (ref 4.2–5.8)
RBC # FLD: 12.5 % — SIGNIFICANT CHANGE UP (ref 10.3–14.5)
RBC CASTS # UR COMP ASSIST: SIGNIFICANT CHANGE UP /HPF (ref 0–4)
SODIUM SERPL-SCNC: 142 MMOL/L — SIGNIFICANT CHANGE UP (ref 135–145)
SP GR SPEC: 1.01 — SIGNIFICANT CHANGE UP (ref 1.01–1.02)
UROBILINOGEN FLD QL: NEGATIVE — SIGNIFICANT CHANGE UP
WBC # BLD: 7.17 K/UL — SIGNIFICANT CHANGE UP (ref 3.8–10.5)
WBC # FLD AUTO: 7.17 K/UL — SIGNIFICANT CHANGE UP (ref 3.8–10.5)
WBC UR QL: SIGNIFICANT CHANGE UP /HPF (ref 0–5)

## 2022-01-15 PROCEDURE — 36415 COLL VENOUS BLD VENIPUNCTURE: CPT

## 2022-01-15 PROCEDURE — 83735 ASSAY OF MAGNESIUM: CPT

## 2022-01-15 PROCEDURE — 96374 THER/PROPH/DIAG INJ IV PUSH: CPT

## 2022-01-15 PROCEDURE — 99284 EMERGENCY DEPT VISIT MOD MDM: CPT

## 2022-01-15 PROCEDURE — 96361 HYDRATE IV INFUSION ADD-ON: CPT

## 2022-01-15 PROCEDURE — 82009 KETONE BODYS QUAL: CPT

## 2022-01-15 PROCEDURE — 96375 TX/PRO/DX INJ NEW DRUG ADDON: CPT

## 2022-01-15 PROCEDURE — 85025 COMPLETE CBC W/AUTO DIFF WBC: CPT

## 2022-01-15 PROCEDURE — 82962 GLUCOSE BLOOD TEST: CPT

## 2022-01-15 PROCEDURE — 93005 ELECTROCARDIOGRAM TRACING: CPT

## 2022-01-15 PROCEDURE — 81001 URINALYSIS AUTO W/SCOPE: CPT

## 2022-01-15 PROCEDURE — 80053 COMPREHEN METABOLIC PANEL: CPT

## 2022-01-15 PROCEDURE — 93010 ELECTROCARDIOGRAM REPORT: CPT

## 2022-01-15 PROCEDURE — 99284 EMERGENCY DEPT VISIT MOD MDM: CPT | Mod: 25

## 2022-01-15 PROCEDURE — 84100 ASSAY OF PHOSPHORUS: CPT

## 2022-01-15 RX ORDER — SODIUM CHLORIDE 9 MG/ML
1000 INJECTION INTRAMUSCULAR; INTRAVENOUS; SUBCUTANEOUS ONCE
Refills: 0 | Status: COMPLETED | OUTPATIENT
Start: 2022-01-15 | End: 2022-01-15

## 2022-01-15 RX ORDER — INSULIN HUMAN 100 [IU]/ML
5 INJECTION, SOLUTION SUBCUTANEOUS ONCE
Refills: 0 | Status: COMPLETED | OUTPATIENT
Start: 2022-01-15 | End: 2022-01-15

## 2022-01-15 RX ADMIN — SODIUM CHLORIDE 1000 MILLILITER(S): 9 INJECTION INTRAMUSCULAR; INTRAVENOUS; SUBCUTANEOUS at 17:32

## 2022-01-15 RX ADMIN — SODIUM CHLORIDE 1000 MILLILITER(S): 9 INJECTION INTRAMUSCULAR; INTRAVENOUS; SUBCUTANEOUS at 18:32

## 2022-01-15 RX ADMIN — INSULIN HUMAN 5 UNIT(S): 100 INJECTION, SOLUTION SUBCUTANEOUS at 19:00

## 2022-01-15 RX ADMIN — INSULIN HUMAN 5 UNIT(S): 100 INJECTION, SOLUTION SUBCUTANEOUS at 17:32

## 2022-01-15 NOTE — ED PROVIDER NOTE - ATTENDING CONTRIBUTION TO CARE
I personally evaluated the patient. I reviewed the Resident’s or Physician Assistant’s note (as assigned above), and agree with the findings and plan except as documented in my note.  62 y/o M w/ PMH of DM presenting for hyperglycemia. Patient admits to lightheadedness, blurry vision, polydipsia and polyuria worsened today. Patient states he took his BG which after a meal which was reported 500+. Patient has been non-compliant with his PO medications (Jardiance 10mg and Pioglitazone 15mg), states he and PCP are attempting to manage his diabetes with oral medication and he has not been placed on insulin. Patient states he is caring for his wife who is undergoing breast cancer treatment, which has caused him to neglect his health. Denies falls, injury, trauma, CP, SOB, N/V/D, fever or chills.    Basic labs ordered, IV fluid and 5 units of insulin.

## 2022-01-15 NOTE — ED PROVIDER NOTE - CLINICAL SUMMARY MEDICAL DECISION MAKING FREE TEXT BOX
60 y/o M w/ PMH of DM presenting for hyperglycemia. Patient admits to lightheadedness, blurry vision, polydipsia and polyuria worsened today. Patient states he took his BG which after a meal which was reported 500+. Patient has been non-compliant with his PO medications (Jardiance 10mg and Pioglitazone 15mg), states he and PCP are attempting to manage his diabetes with oral medication and he has not been placed on insulin. Patient states he is caring for his wife who is undergoing breast cancer treatment, which has caused him to neglect his health. Denies falls, injury, trauma, CP, SOB, N/V/D, fever or chills.    Basic labs ordered, IV fluid and 5 units of insulin.

## 2022-01-15 NOTE — ED PROVIDER NOTE - ENDOCRINE [+], MLM
"SUBJECTIVE:  Dinorah Lindsay is a 9 year old female who presents to the clinic today for evaluation of fever, cough, sore throat  Onset 3 days ago, course is unchanged  Associated symptoms: cough is productive    Treatments - mucinex  Exposures - school, home  History of asthma and environmental allergies is - negative  Tobacco use or second hand smoke exposure history - negative  Normal urine and decreased stool frequency      Past Medical History:   Diagnosis Date     Anxiety disorder     No Comments Provided     Dermatitis     No Comments Provided     Developmental disorder of speech or language     No Comments Provided     Hypothyroidism     No Comments Provided      Social History     Tobacco Use     Smoking status: Never Smoker     Smokeless tobacco: Never Used   Substance Use Topics     Alcohol use: Not on file     Current Outpatient Medications   Medication     Cholecalciferol (VITAMIN D3) 1000 UNITS CAPS     levothyroxine (SYNTHROID/LEVOTHROID) 25 MCG tablet     levothyroxine (SYNTHROID/LEVOTHROID) 50 MCG tablet     melatonin 3 MG tablet     ranitidine (ZANTAC) 75 MG/5ML syrup     hydrocortisone 2.5 % cream     Multiple Vitamins-Minerals (MULTI ADULT GUMMIES PO)     polyethylene glycol (MIRALAX/GLYCOLAX) powder     Current Facility-Administered Medications   Medication     ibuprofen (ADVIL/MOTRIN) suspension 400 mg      No Known Allergies      ROS  General fever, fatigue  HENT congestion, runny nose  Respiratory cough  Abdomen decreased appetite, drinking well, stool is normal  : normal urine      OBJECTIVE:  Exam:  Vitals:    10/13/19 1238   BP: 106/64   Pulse: 117   Resp: 24   Temp: 102.2  F (39  C)   TempSrc: Tympanic   SpO2: 93%   Weight: 48.4 kg (106 lb 11.2 oz)   Height: 1.334 m (4' 4.5\")     General: healthy, alert and no distress, appears hydarated, vital noted, febrile  Head: NORMAL - atraumatic, nontender.  Ears: mild erythema bilaterally, no bulging  Eyes: ABNORMAL - mild injection " bilaterally  Nose: ABNORMAL - erythema, edema, no sinus tenderness  Neck: supple, non-tender, free range of motion, no adenopathy  Throat: ABNORMAL -  Moderate erythema  Resp: ABNORMAL - distant breath sounds bases, no increased work of breathing, no stridor. Lungs clear to auscultation  Cardiac: ABNORMAL - normal rhythm, HR noted - patient is febrile    Results for orders placed or performed in visit on 10/13/19   CBC and Differential   Result Value Ref Range    WBC 8.9 5.0 - 14.5 10e9/L    RBC Count 5.12 3.7 - 5.3 10e12/L    Hemoglobin 14.6 (H) 10.5 - 14.0 g/dL    Hematocrit 40.9 31.5 - 43.0 %    MCV 80 70 - 100 fl    MCH 28.5 26.5 - 33.0 pg    MCHC 35.7 31.5 - 36.5 g/dL    RDW 11.8 10.0 - 15.0 %    Platelet Count 275 150 - 450 10e9/L    Diff Method Automated Method     % Neutrophils 60.4 %    % Lymphocytes 24.4 %    % Monocytes 12.3 %    % Eosinophils 2.3 %    % Basophils 0.3 %    % Immature Granulocytes 0.3 %    Absolute Neutrophil 5.4 1.3 - 8.1 10e9/L    Absolute Lymphocytes 2.2 1.1 - 8.6 10e9/L    Absolute Monocytes 1.1 0.0 - 1.1 10e9/L    Absolute Eosinophils 0.2 0.0 - 0.7 10e9/L    Absolute Basophils 0.0 0.0 - 0.2 10e9/L    Abs Immature Granulocytes 0.0 0 - 0.4 10e9/L     Recent Results (from the past 24 hour(s))   XR Chest 2 Views    Narrative    XR CHEST 2 VW    HISTORY: 9 years Female Fever in child    COMPARISON: None    TECHNIQUE: 2 views of the chest were obtained.    FINDINGS: There is airspace opacity within the left lower lobe best  seen on the lateral view. Lungs otherwise clear.        Impression    IMPRESSION: Left lower lobe airspace opacity suggestive of pneumonia.    MARGO DE LA ROSA MD         ASSESSMENT:    (J18.1) Pneumonia of left lower lobe due to infectious organism (primary encounter diagnosis)  (J22) Lower respiratory infection    Plan: azithromycin (ZITHROMAX) 200 MG/5ML suspension,        cefTRIAXone (ROCEPHIN) 1 g in lidocaine (PF)         (XYLOCAINE) 1 % injection      (R50.9)  Fever in child  Plan: ibuprofen (ADVIL/MOTRIN) suspension 400 mg, XR         Chest 2 Views, CBC and Differential      PLAN:    Was seen in ER [place] on 10/11/19, negative strep PCR and negative influenza  Has had temp of 103 for past 3 days with cough  Chest XR: LLL pneumonia, XR independently reviewed  Medications administered - ibuprofen oral suspension, ceftriaxone 1 gm   Start Azithromycin oral suspension, take this once daily x 5 days  Discussed symptomatic treatments per AVS  Follow up with PCP for a recheck in 1-2 days  Seek immediate care for fever > 104, difficulty breathing  Patient received verbal and written instruction including review of warning signs     Ruth Ann Green PA-C on 10/13/2019 at 3:49 PM         EXCESSIVE THIRST/EXCESSIVE URINATION/HYPERGLYCEMIA

## 2022-01-15 NOTE — ED PROVIDER NOTE - CHPI ED SYMPTOMS NEG
no fever/no nausea/no pain/no tingling/no vomiting/no weakness/no chills/no decreased eating/drinking

## 2022-01-15 NOTE — ED PROVIDER NOTE - OBJECTIVE STATEMENT
62 y/o M w/ PMH of DM presenting for hyperglycemia. Patient admits to lightheadedness, blurry vision, polydipsia and polyuria worsened today. Patient states he took his BG which after a meal which was reported 500+. Patient has been non-compliant with his PO medications (Jardiance 10mg and Pioglitazone 15mg), states he and PCP are attempting to manage his diabetes with oral medication and he has not been placed on insulin. Patient states he is caring for his wife who is undergoing breast cancer treatment, which has caused him to neglect his health. Denies falls, injury, trauma, CP, SOB, N/V/D, fever or chills.

## 2022-01-15 NOTE — ED PROVIDER NOTE - NSFOLLOWUPINSTRUCTIONS_ED_ALL_ED_FT
Type 2 Diabetes Management for Adults    WHAT YOU NEED TO KNOW:    Type 2 diabetes is a disease that affects how your body uses glucose (sugar). Either your body cannot make enough insulin, or it cannot use the insulin correctly. It is important to keep diabetes controlled to prevent damage to your heart, blood vessels, and other organs.    DISCHARGE INSTRUCTIONS:    Have someone call your local emergency number (911 in the ) if:   •You cannot be woken.    •You have signs of diabetic ketoacidosis: ?confusion, fatigue    ?vomiting    ?rapid heartbeat    ?fruity smelling breath    ?extreme thirst    ?dry mouth and skin    •You have any of the following signs of a heart attack: ?Squeezing, pressure, or pain in your chest    ?You may also have any of the following: ?Discomfort or pain in your back, neck, jaw, stomach, or arm      ?Shortness of breath    ?Nausea or vomiting    ?Lightheadedness or a sudden cold sweat      •You have any of the following signs of a stroke: ?Numbness or drooping on one side of your face     ?Weakness in an arm or leg    ?Confusion or difficulty speaking    ?Dizziness, a severe headache, or vision loss        Call your doctor or diabetes care team if:   •You have a sore or wound that will not heal.    •You have a change in the amount you urinate.    •Your blood sugar levels are higher than your target goals.    •You often have lower blood sugar levels than your target goals.    •Your skin is red, dry, warm, or swollen.    •You have trouble coping with diabetes, or you feel anxious or depressed.    •You have questions or concerns about your condition or care.      What you need to know about high blood sugar levels: High blood sugar levels may not cause any symptoms. You may feel more thirsty or urinate more often than usual. Over time, high blood sugar levels can damage your nerves, blood vessels, tissues, and organs. The following can increase your blood sugar levels:   •Large meals or large amounts of carbohydrates at one time    •Less physical activity    •Stress    •Illness    •A lower dose of medicine or insulin, or a late dose    What you need to know about low blood sugar levels: You can prevent symptoms such as shakiness, dizziness, irritability, or confusion by preventing your blood sugar levels from going too low.  •Treat a low blood sugar level right away. ?Drink 4 ounces of juice or have 1 tube of glucose gel.    ?Check your blood sugar level again 10 to 15 minutes later.    ?When the level goes back to normal, eat a meal or snack to prevent another decrease.    •Keep glucose gel, raisins, or hard candy with you at all times to treat a low blood sugar level.    •Your blood sugar level can get too low if you take diabetes medicine or insulin and do not eat enough food.    •If you use insulin, check your blood sugar level before you exercise. ?If your blood sugar level is below 100 mg/dL, eat 4 crackers or 2 ounces of raisins, or drink 4 ounces of juice.    ?Check your level every 30 minutes if you exercise more than 1 hour.    ?You may need a snack during or after exercise.    What you can do to manage your blood sugar levels:   •Check your blood sugar levels as directed and as needed. Several items are available to use to check your levels. You may need to check by testing a drop of blood in a glucose monitor. You may instead be given a continuous glucose monitoring (CGM) device. The device is worn at all times. The CGM checks your blood sugar level every 5 minutes. It sends results to an electronic device such as a smart phone. A CGM can be used with or without an insulin pump. Talk with your provider to find out which method is best for you. The goal for blood sugar levels before meals is between 80 and 130 mg/dL and 2 hours after eating is lower than 180 mg/dL.    •Make healthy food choices. Work with a dietitian to develop a meal plan that works for you and your schedule. A dietitian can help you learn how to eat the right amount of carbohydrates during your meals and snacks. Carbohydrates can raise your blood sugar level if you eat too many at one time. Examples of foods that contain carbohydrates are breads, cereals, rice, pasta, and sweets.    •Get regular physical activity. Physical activity can help you get to your target blood sugar level goal and manage your weight. Get at least 150 minutes of moderate to vigorous aerobic physical activity each week. Do not miss more than 2 days in a row. Do not sit longer than 30 minutes at a time. Your healthcare provider can help you create an activity plan. The plan can include the best activities for you and can help you build your strength and endurance.    •Maintain a healthy weight. Ask your healthcare provider what a healthy weight is for you. Ask him or her to help you create a safe weight loss plan if you are overweight.    •Take your diabetes medicine or insulin as directed. You may need diabetes medicine, insulin, or both to help control your blood sugar levels. Your healthcare provider will teach you how and when to take your diabetes medicine or insulin. You will also be taught about side effects oral diabetes medicine can cause. Insulin may be injected, or given through a pump or pen. You and your care team will discuss which method is best for you.?An insulin pump is an implanted device that gives your insulin 24 hours a day. An insulin pump prevents the need for multiple insulin injections in a day.    ?An insulin pen is a device prefilled with the right amount of insulin.  Insulin Pen      ?You and your family members will be taught how to draw up and give insulin if this is the best method for you. Your education team will also teach you how to dispose of needles and syringes.    ?You will learn how much insulin you need and when to give it. You will be taught when not to give insulin. You will also be taught what to do if your blood sugar level drops too low. This may happen if you take insulin and do not eat the right amount of carbohydrates.    Other things you can do to manage type 2 diabetes:   •Wear medical alert identification. Wear medical alert jewelry or carry a card that says you have diabetes.     •Do not smoke. Nicotine and other chemicals in cigarettes and cigars can cause lung and blood vessel damage. It also makes it more difficult to manage your diabetes. Ask your provider for information if you currently smoke and need help to quit. Do not use e-cigarettes or smokeless tobacco in place of cigarettes or to help you quit. They still contain nicotine.    •Check your feet each day for cuts, scratches, calluses, or other wounds. Look for redness and swelling, and feel for warmth. Wear shoes that fit well. Check your shoes for rocks or other objects that can hurt your feet. Do not walk barefoot or wear shoes without socks. Wear cotton socks to help keep your feet dry.    •Ask about vaccines you may need. You have a higher risk for serious illness if you get the flu, pneumonia, COVID-19, or hepatitis. Ask your provider if you should get vaccines to prevent these or other diseases, and when to get the vaccines.    •Talk to your care team if you become stressed about diabetes care. Sometimes being able to fit diabetes care into your life can cause increased stress. The stress can cause you not to take care of yourself properly. Your care team can help by offering tips about self-care. Your care team may suggest you talk to a mental health provider. The provider can listen and offer help with self-care issues.    Follow up with your doctor or diabetes care team as directed: You may need to have blood tests done before your follow-up visit. The test results will show if changes need to be made in your treatment or self-care. Write down your questions so you remember to ask them during your visits. Talk to your provider if you cannot afford your medicine.

## 2022-01-15 NOTE — ED PROVIDER NOTE - PATIENT PORTAL LINK FT
You can access the FollowMyHealth Patient Portal offered by [facility] by registering at the following website: [email or web address]. By joining [facility]’s FollowMyHealth portal, you will also be able to view your health information using other applications (apps) compatible with our system.

## 2022-01-15 NOTE — ED ADULT NURSE NOTE - OBJECTIVE STATEMENT
Pt a&ox3 ambulatory to ED c/o high blood sugar. Pt states he has had some lightheaded-ness, blurry vision, polydipsia and polyuria. Pt took at home blood sugar which was greater than 500. Pt denies cp, sob, fever/chills.

## 2022-03-02 ENCOUNTER — NON-APPOINTMENT (OUTPATIENT)
Age: 62
End: 2022-03-02

## 2022-03-03 ENCOUNTER — LABORATORY RESULT (OUTPATIENT)
Age: 62
End: 2022-03-03

## 2022-03-07 LAB
25(OH)D3 SERPL-MCNC: 34.6 NG/ML
ALBUMIN SERPL ELPH-MCNC: 4.8 G/DL
ALP BLD-CCNC: 61 U/L
ALT SERPL-CCNC: 22 U/L
ANION GAP SERPL CALC-SCNC: 21 MMOL/L
APPEARANCE: CLEAR
AST SERPL-CCNC: 7 U/L
BASOPHILS # BLD AUTO: 0.05 K/UL
BASOPHILS NFR BLD AUTO: 0.7 %
BILIRUB SERPL-MCNC: 0.8 MG/DL
BILIRUBIN URINE: NEGATIVE
BLOOD URINE: NORMAL
BUN SERPL-MCNC: 26 MG/DL
CALCIUM SERPL-MCNC: 10.1 MG/DL
CHLORIDE SERPL-SCNC: 97 MMOL/L
CHOLEST SERPL-MCNC: 251 MG/DL
CO2 SERPL-SCNC: 21 MMOL/L
COLOR: NORMAL
CREAT SERPL-MCNC: 1.07 MG/DL
CREAT SPEC-SCNC: 53 MG/DL
EGFR: 79 ML/MIN/1.73M2
EOSINOPHIL # BLD AUTO: 0.06 K/UL
EOSINOPHIL NFR BLD AUTO: 0.8 %
ESTIMATED AVERAGE GLUCOSE: 392 MG/DL
FOLATE SERPL-MCNC: 19.4 NG/ML
GLUCOSE QUALITATIVE U: ABNORMAL
GLUCOSE SERPL-MCNC: 393 MG/DL
HBA1C MFR BLD HPLC: 15.3 %
HCT VFR BLD CALC: 51.8 %
HDLC SERPL-MCNC: 45 MG/DL
HGB BLD-MCNC: 17.5 G/DL
IMM GRANULOCYTES NFR BLD AUTO: 0.8 %
KETONES URINE: ABNORMAL
LDLC SERPL CALC-MCNC: NORMAL MG/DL
LDLC SERPL DIRECT ASSAY-MCNC: 132 MG/DL
LEUKOCYTE ESTERASE URINE: ABNORMAL
LYMPHOCYTES # BLD AUTO: 1.98 K/UL
LYMPHOCYTES NFR BLD AUTO: 26.5 %
MAN DIFF?: NORMAL
MCHC RBC-ENTMCNC: 30.6 PG
MCHC RBC-ENTMCNC: 33.8 GM/DL
MCV RBC AUTO: 90.6 FL
MICROALBUMIN 24H UR DL<=1MG/L-MCNC: 2.7 MG/DL
MICROALBUMIN/CREAT 24H UR-RTO: 51 MG/G
MONOCYTES # BLD AUTO: 0.6 K/UL
MONOCYTES NFR BLD AUTO: 8 %
NEUTROPHILS # BLD AUTO: 4.71 K/UL
NEUTROPHILS NFR BLD AUTO: 63.2 %
NITRITE URINE: NEGATIVE
NONHDLC SERPL-MCNC: 206 MG/DL
PH URINE: 6
PLATELET # BLD AUTO: 158 K/UL
POTASSIUM SERPL-SCNC: 5.2 MMOL/L
PROT SERPL-MCNC: 7.4 G/DL
PROTEIN URINE: NEGATIVE
PSA SERPL-MCNC: 1.97 NG/ML
RBC # BLD: 5.72 M/UL
RBC # FLD: 13 %
SODIUM SERPL-SCNC: 139 MMOL/L
SPECIFIC GRAVITY URINE: 1.04
T4 FREE SERPL-MCNC: 1.4 NG/DL
TRIGL SERPL-MCNC: 432 MG/DL
TSH SERPL-ACNC: 1.61 UIU/ML
UROBILINOGEN URINE: NORMAL
VIT B12 SERPL-MCNC: 603 PG/ML
WBC # FLD AUTO: 7.46 K/UL

## 2022-03-21 ENCOUNTER — RX RENEWAL (OUTPATIENT)
Age: 62
End: 2022-03-21

## 2022-03-22 ENCOUNTER — EMERGENCY (EMERGENCY)
Facility: HOSPITAL | Age: 62
LOS: 1 days | Discharge: HOME | End: 2022-03-22
Attending: EMERGENCY MEDICINE | Admitting: EMERGENCY MEDICINE
Payer: MEDICAID

## 2022-03-22 VITALS
HEIGHT: 73 IN | HEART RATE: 115 BPM | SYSTOLIC BLOOD PRESSURE: 118 MMHG | OXYGEN SATURATION: 97 % | DIASTOLIC BLOOD PRESSURE: 87 MMHG | WEIGHT: 244.71 LBS | TEMPERATURE: 98 F | RESPIRATION RATE: 17 BRPM

## 2022-03-22 LAB — GLUCOSE BLDC GLUCOMTR-MCNC: 332 MG/DL — HIGH (ref 70–99)

## 2022-03-22 PROCEDURE — 99283 EMERGENCY DEPT VISIT LOW MDM: CPT

## 2022-03-22 PROCEDURE — 99284 EMERGENCY DEPT VISIT MOD MDM: CPT

## 2022-03-22 PROCEDURE — 82962 GLUCOSE BLOOD TEST: CPT

## 2022-03-22 RX ORDER — IBUPROFEN 200 MG
600 TABLET ORAL ONCE
Refills: 0 | Status: COMPLETED | OUTPATIENT
Start: 2022-03-22 | End: 2022-03-22

## 2022-03-22 RX ORDER — DOCUSATE SODIUM 100 MG
1 CAPSULE ORAL
Qty: 60 | Refills: 0
Start: 2022-03-22 | End: 2022-04-20

## 2022-03-22 RX ORDER — LIDOCAINE/HYDROCORTISONE AC 3 %-0.5 %
1 CREAM WITH APPLICATOR RECTAL
Qty: 40 | Refills: 0
Start: 2022-03-22 | End: 2022-04-10

## 2022-03-22 RX ORDER — LIDOCAINE AND PRILOCAINE CREAM 25; 25 MG/G; MG/G
1 CREAM TOPICAL ONCE
Refills: 0 | Status: COMPLETED | OUTPATIENT
Start: 2022-03-22 | End: 2022-03-22

## 2022-03-22 RX ADMIN — LIDOCAINE AND PRILOCAINE CREAM 1 APPLICATION(S): 25; 25 CREAM TOPICAL at 20:20

## 2022-03-22 RX ADMIN — Medication 600 MILLIGRAM(S): at 20:29

## 2022-03-22 NOTE — ED PROVIDER NOTE - ATTENDING CONTRIBUTION TO CARE
pt c/o rectal pain, sharp, severe, when trying to pass BM and when sitting.  pain started as he was straining to pass bm few days ago. pain persist since. no rectal bleeding. no black bloody stools.  no abd pain. no fever. pt also has diabetes dx this year and has polyuria /polydipsia with high sugars ~400 for months. recently had insulin added to regimen.  is trying to get appt with endocrine unsuccessfully.    exam:   General: well appearing, NAD.   HEENT: eyes perrl,   cor: RRR, s1s2, 2+rad pulses.   lungs: ctabl, no resp distress.   abd: soft, ntnd. anus normal appearing, no lesions. digital rectal exam no masses, no blood. brown stool. moderate tenderness on exam.   neuro: a&ox3, cn2-12 intact, KIM, 5/5 strength c nl sensation all extremities, nl coordination.   MSK: no extremity swelling.  Skin: normal, no rash

## 2022-03-22 NOTE — ED ADULT TRIAGE NOTE - CHIEF COMPLAINT QUOTE
Pt c/o rectal pain, stated he was constipated 5 days, stool looked different when he moved his bowel, stated he is diabetic and his sugar is always high

## 2022-03-22 NOTE — ED ADULT NURSE NOTE - OBJECTIVE STATEMENT
Patient comes to ER for x5 day history of rectal pain. Patient reports being constipated prior and had difficulty staffing stool. Once he was able to severe pain when straining and positioning. Denies any bloody stool or black/tarry stool. Reports pain with mechanics and now has pain with sitting, moving, walking. Reports that pain is "inside rectum." Denies any diarrhea. Reports that stool different in shape now. Possible chills, no fever. No abdominal pain. No nausea or vomiting.

## 2022-03-22 NOTE — ED PROVIDER NOTE - OBJECTIVE STATEMENT
61 y/o M h/o DM, HLD pw rectal pain x 3 days. States previous constipation/ passing of large stool. Last BM today.  Denies fever, chills, ab pain, n/v/d, dysuria, weakness, blood in stool. Denies h/o hemorrhoids. Took no meds for relief.   No surgical hx

## 2022-03-22 NOTE — ED PROVIDER NOTE - NSFOLLOWUPINSTRUCTIONS_ED_ALL_ED_FT
Rectal Pain    WHAT YOU NEED TO KNOW:    Rectal pain can be caused by a number of conditions, such as hemorrhoids, an abscess, trauma, or anal tear. Infection, muscle spasms, or anal intercourse can also cause rectal pain.     DISCHARGE INSTRUCTIONS:    Medicines: You may need any of the following:   •NSAIDs, such as ibuprofen, help decrease swelling, pain, and fever. This medicine is available with or without a doctor's order. NSAIDs can cause stomach bleeding or kidney problems in certain people. If you take blood thinner medicine, always ask your healthcare provider if NSAIDs are safe for you. Always read the medicine label and follow directions.      •Prescription pain medicine may be given. Ask how to take this medicine safely.      •Antibiotics help treat or prevent a bacterial infection.      •Bowel movement softeners help soften your bowel movement. They help prevent straining and more damage to the area.      •Take your medicine as directed. Contact your healthcare provider if you think your medicine is not helping or if you have side effects. Tell him or her if you are allergic to any medicine. Keep a list of the medicines, vitamins, and herbs you take. Include the amounts, and when and why you take them. Bring the list or the pill bottles to follow-up visits. Carry your medicine list with you in case of an emergency.      Return to the emergency department if:   •You have severe pain.          Contact your healthcare provider if:   •Your pain does not decrease after 1 to 2 days of treatment.      •You cannot take the medicine prescribed for your condition.      •You have questions or concerns about your condition or care.      Take a sitz bath: Fill a bathtub with 4 to 6 inches of warm water. You may also use a sitz bath pan that fits over a toilet. Sit in the sitz bath for 20 minutes. Do this 2 to 3 times a day, or as directed. The warm water can help decrease pain, muscle spasms, or swelling.     Apply heat: Apply a warm, moist compress on your anus for 20 to 30 minutes every 2 hours for as many days as directed. Heat helps decrease pain and muscle spasms.    Eat high-fiber foods: This will help prevent constipation and soften your bowel movements. High-fiber foods include fruit, vegetables, whole-grain breads and cereals, and beans. A dietitian or healthcare provider can help you create a high-fiber meal plan.     Drink liquids as directed: You may need to drink more liquid than usual to help soften your bowel movements. Ask how much liquid to drink each day and which liquids are best for you.     Follow up with your healthcare provider as directed: Write down your questions so you remember to ask them during your visits. - continue advil 600mg every 6 hrs as needed  - use lidocaine/hydrocortisone cream as directed  - see gastroenterology or colon surgeon for follow up in next 1-3 days  - see endocrinologist or your primary doctor regarding high blood sugars    Rectal Pain    WHAT YOU NEED TO KNOW:    Rectal pain can be caused by a number of conditions, such as hemorrhoids, an abscess, trauma, or anal tear. Infection, muscle spasms, or anal intercourse can also cause rectal pain.     DISCHARGE INSTRUCTIONS:    Medicines: You may need any of the following:   •NSAIDs, such as ibuprofen, help decrease swelling, pain, and fever. This medicine is available with or without a doctor's order. NSAIDs can cause stomach bleeding or kidney problems in certain people. If you take blood thinner medicine, always ask your healthcare provider if NSAIDs are safe for you. Always read the medicine label and follow directions.      •Prescription pain medicine may be given. Ask how to take this medicine safely.      •Antibiotics help treat or prevent a bacterial infection.      •Bowel movement softeners help soften your bowel movement. They help prevent straining and more damage to the area.      •Take your medicine as directed. Contact your healthcare provider if you think your medicine is not helping or if you have side effects. Tell him or her if you are allergic to any medicine. Keep a list of the medicines, vitamins, and herbs you take. Include the amounts, and when and why you take them. Bring the list or the pill bottles to follow-up visits. Carry your medicine list with you in case of an emergency.      Return to the emergency department if:   •You have severe pain.          Contact your healthcare provider if:   •Your pain does not decrease after 1 to 2 days of treatment.      •You cannot take the medicine prescribed for your condition.      •You have questions or concerns about your condition or care.      Take a sitz bath: Fill a bathtub with 4 to 6 inches of warm water. You may also use a sitz bath pan that fits over a toilet. Sit in the sitz bath for 20 minutes. Do this 2 to 3 times a day, or as directed. The warm water can help decrease pain, muscle spasms, or swelling.     Apply heat: Apply a warm, moist compress on your anus for 20 to 30 minutes every 2 hours for as many days as directed. Heat helps decrease pain and muscle spasms.    Eat high-fiber foods: This will help prevent constipation and soften your bowel movements. High-fiber foods include fruit, vegetables, whole-grain breads and cereals, and beans. A dietitian or healthcare provider can help you create a high-fiber meal plan.     Drink liquids as directed: You may need to drink more liquid than usual to help soften your bowel movements. Ask how much liquid to drink each day and which liquids are best for you.     Follow up with your healthcare provider as directed: Write down your questions so you remember to ask them during your visits.

## 2022-03-22 NOTE — ED PROVIDER NOTE - PATIENT PORTAL LINK FT
You can access the FollowMyHealth Patient Portal offered by [facility] by registering at the following website: [email or web address]. By joining [facility]’s FollowMyHealth portal, you will also be able to view your health information using other applications (apps) compatible with our system.

## 2022-03-22 NOTE — ED PROVIDER NOTE - PROVIDER TOKENS
PROVIDER:[TOKEN:[[id number]],FOLLOWUP:[1-3 Days]],PROVIDER:[TOKEN:[[id number]],FOLLOWUP:[1-3 Days]],PROVIDER:[TOKEN:[[id number]],FOLLOWUP:[1-3 Days]]

## 2022-03-22 NOTE — ED PROVIDER NOTE - CLINICAL SUMMARY MEDICAL DECISION MAKING FREE TEXT BOX
63 y/o M h/o DM, HLD pw rectal pain x 3 days. States previous constipation/ passing of large stool. Last BM today.  Denies fever, chills, ab pain, n/v/d, dysuria, weakness, blood in stool. Denies h/o hemorrhoids. Took no meds for relief.   No surgical hx  Plan: Possible fissure? Will apply topical lido 61 y/o M h/o DM, HLD pw rectal pain x 3 days. States previous constipation/ passing of large stool. Last BM today.  Denies fever, chills, ab pain, n/v/d, dysuria, weakness, blood in stool. Denies h/o hemorrhoids. Took no meds for relief.   No surgical hx  Plan: Possible fissure? Will apply topical lido    Dr Farias:   pt c DM , rectal pain started after straining, no bleeding. no external anal lesions. tenderness on LATOINA. likely fissure, posisble hemorrhoid. no fever or other s/s to suggest infx/proctitis beside pain.  recommend stool softeners, advil, topical lidocaine/hydrocortisone. GI /surg f/u.  FSG ~330, chronically high per pt..  no s/s dka.  f/u pmd/endocrine

## 2022-03-22 NOTE — ED PROVIDER NOTE - NS ED ATTENDING STATEMENT MOD
This was a shared visit with the PORTILLO. I reviewed and verified the documentation and independently performed the documented:

## 2022-03-22 NOTE — ED PROVIDER NOTE - CARE PROVIDER_API CALL
Manuel Reese)  Gastroenterology; Internal Medicine  [address], [address]  [place] [id number]  Phone: [phone]  Fax: [phone]  Follow Up Time: 1-3 Days    Willie Rojas)  Surgery  [address], [address]  [place] [address]  Phone: [phone]  Fax: [phone]  Follow Up Time: 1-3 Days    Todd Tracey)  EndocrinologyMetabDiabetes  [address] [place] [address], [address]  [place] [address]  Phone: [phone]  Fax: [phone]  Follow Up Time: 1-3 Days

## 2022-03-24 PROBLEM — E11.9 TYPE 2 DIABETES MELLITUS WITHOUT COMPLICATIONS: Chronic | Status: ACTIVE | Noted: 2022-03-22

## 2022-03-25 ENCOUNTER — APPOINTMENT (OUTPATIENT)
Dept: GASTROENTEROLOGY | Facility: CLINIC | Age: 62
End: 2022-03-25
Payer: MEDICAID

## 2022-03-25 ENCOUNTER — APPOINTMENT (OUTPATIENT)
Dept: SURGERY | Facility: CLINIC | Age: 62
End: 2022-03-25
Payer: MEDICAID

## 2022-03-25 VITALS
DIASTOLIC BLOOD PRESSURE: 70 MMHG | WEIGHT: 265 LBS | HEIGHT: 73 IN | SYSTOLIC BLOOD PRESSURE: 110 MMHG | OXYGEN SATURATION: 97 % | BODY MASS INDEX: 35.12 KG/M2 | HEART RATE: 101 BPM

## 2022-03-25 VITALS
RESPIRATION RATE: 15 BRPM | HEART RATE: 108 BPM | BODY MASS INDEX: 34.72 KG/M2 | OXYGEN SATURATION: 98 % | HEIGHT: 73 IN | WEIGHT: 262 LBS | SYSTOLIC BLOOD PRESSURE: 126 MMHG | TEMPERATURE: 97.4 F | DIASTOLIC BLOOD PRESSURE: 73 MMHG

## 2022-03-25 DIAGNOSIS — K59.00 CONSTIPATION, UNSPECIFIED: ICD-10-CM

## 2022-03-25 DIAGNOSIS — Z12.11 ENCOUNTER FOR SCREENING FOR MALIGNANT NEOPLASM OF COLON: ICD-10-CM

## 2022-03-25 DIAGNOSIS — K61.0 ANAL ABSCESS: ICD-10-CM

## 2022-03-25 PROCEDURE — 99203 OFFICE O/P NEW LOW 30 MIN: CPT

## 2022-03-25 PROCEDURE — 99204 OFFICE O/P NEW MOD 45 MIN: CPT | Mod: 25

## 2022-03-25 PROCEDURE — 10061 I&D ABSCESS COMP/MULTIPLE: CPT

## 2022-03-25 RX ORDER — FLUTICASONE FUROATE AND VILANTEROL TRIFENATATE 100; 25 UG/1; UG/1
100-25 POWDER RESPIRATORY (INHALATION)
Qty: 1 | Refills: 3 | Status: DISCONTINUED | COMMUNITY
Start: 2019-09-09 | End: 2022-03-25

## 2022-03-25 RX ORDER — FLUCONAZOLE 150 MG/1
150 TABLET ORAL
Qty: 1 | Refills: 0 | Status: DISCONTINUED | COMMUNITY
Start: 2022-01-14 | End: 2022-03-25

## 2022-03-25 RX ORDER — INSULIN GLARGINE 100 [IU]/ML
100 INJECTION, SOLUTION SUBCUTANEOUS
Qty: 1 | Refills: 2 | Status: DISCONTINUED | COMMUNITY
Start: 2022-03-07 | End: 2022-03-25

## 2022-03-25 RX ORDER — PIOGLITAZONE HYDROCHLORIDE 15 MG/1
15 TABLET ORAL DAILY
Qty: 60 | Refills: 3 | Status: DISCONTINUED | COMMUNITY
Start: 2021-04-29 | End: 2022-03-25

## 2022-03-25 RX ORDER — FLUCONAZOLE 150 MG/1
150 TABLET ORAL
Qty: 1 | Refills: 0 | Status: DISCONTINUED | COMMUNITY
Start: 2021-05-04 | End: 2022-03-25

## 2022-03-25 RX ORDER — LIDOCAINE 4% 4 G/100G
4 CREAM TOPICAL 3 TIMES DAILY
Qty: 1 | Refills: 1 | Status: COMPLETED | COMMUNITY
Start: 2022-03-25 | End: 2022-04-22

## 2022-03-25 NOTE — HISTORY OF PRESENT ILLNESS
[de-identified] : DIXIE SALDIVAR is a 62 year-old man who was referred by Dr. Obdulio Taylor for further management of a perianal abscess. Patient started having perianal symptoms about 5 days prior that started during a bowel movement. There was no blood or recent changes in stool consistency or caliber prior. No fevers or chills. He presented to the ED a few days ago and was discharged with a presumed diagnosis of anal fissure with plans to follow-up with GI vs surgery. He was seen by Dr. Taylor today and was immediately sent over to Surgery clinic. Patient is reporting 9/10 pain and has difficulty sitting. Relatively constipation over the past few days. No other major concerns at this time. Of note, patient reports that latest blood sugars have been in the 300s. No prior colonoscopies.

## 2022-03-25 NOTE — HISTORY OF PRESENT ILLNESS
[Heartburn] : denies heartburn [Nausea] : denies nausea [Vomiting] : denies vomiting [Diarrhea] : denies diarrhea [Constipation] : denies constipation [Yellow Skin Or Eyes (Jaundice)] : denies jaundice [Abdominal Pain] : denies abdominal pain [Abdominal Swelling] : denies abdominal swelling [Rectal Pain] : rectal pain [Wt Loss ___ Lbs] : no recent weight loss [de-identified] : Reports onset of new 9/10 anorectal pain after large BM. Presented to ED at [facility] on 3/22. Sx started 3 days before. Denies similar symptoms prior to this.\par Has not started any treatment as of yet.\par Denies blood with BM.\par Denies previous colonoscopy.\par Admits to feeling constipation since the onset of pain.\par \par Poorly controlled DM. He reports he has started taking the medications again.

## 2022-03-25 NOTE — PHYSICAL EXAM
[Normal Breath Sounds] : Normal breath sounds [Respiratory Effort] : normal respiratory effort [Normal Heart Sounds] : normal heart sounds [No Rash or Lesion] : No rash or lesion [Alert] : alert [Oriented to Person] : oriented to person [Oriented to Place] : oriented to place [Oriented to Time] : oriented to time [Calm] : calm [JVD] : no jugular venous distention  [de-identified] : Normocephalic [de-identified] : No distress.  [de-identified] : Soft, nondistended.  [de-identified] : Normal [de-identified] : Good anal tone. Fluctuance and induration in posterior perianal skin just left to midline. Mild erythema.

## 2022-03-25 NOTE — PHYSICAL EXAM
[Normal Breath Sounds] : Normal breath sounds [Respiratory Effort] : normal respiratory effort [Normal Heart Sounds] : normal heart sounds [No Rash or Lesion] : No rash or lesion [Alert] : alert [Oriented to Person] : oriented to person [Oriented to Place] : oriented to place [Oriented to Time] : oriented to time [Calm] : calm [JVD] : no jugular venous distention  [de-identified] : No distress.  [de-identified] : Normocephalic [de-identified] : Soft, nondistended.  [de-identified] : Normal [de-identified] : Good anal tone. Fluctuance and induration in posterior perianal skin just left to midline. Mild erythema.

## 2022-03-25 NOTE — REVIEW OF SYSTEMS
[As Noted in HPI] : as noted in HPI [Negative] : Heme/Lymph [Abdominal Pain] : no abdominal pain [Vomiting] : no vomiting [Constipation] : no constipation [Diarrhea] : no diarrhea [Heartburn] : no heartburn [Melena] : no melena [FreeTextEntry7] : +anorectal pain

## 2022-03-25 NOTE — ASSESSMENT
[FreeTextEntry1] : Stool softener (PEG) prescribed.\par Lidocaine for pain control.\par See surgery for possible I&D. Called surgical office and patient appointment scheduled.\par Follow up in 4 weeks for discussion re: colonoscopy for CRC screening. He agrees.\par \par

## 2022-03-25 NOTE — PHYSICAL EXAM
[General Appearance - Alert] : alert [General Appearance - Well Nourished] : well nourished [Sclera] : the sclera and conjunctiva were normal [Extraocular Movements] : extraocular movements were intact [Outer Ear] : the ears and nose were normal in appearance [Nasal Cavity] : the nasal mucosa and septum were normal [Neck Appearance] : the appearance of the neck was normal [Jugular Venous Distention Increased] : there was no jugular-venous distention [Auscultation Breath Sounds / Voice Sounds] : lungs were clear to auscultation bilaterally [Heart Rate And Rhythm] : heart rate was normal and rhythm regular [Heart Sounds] : normal S1 and S2 [Bowel Sounds] : normal bowel sounds [Abdomen Soft] : soft [Abdomen Tenderness] : non-tender [] : no hepato-splenomegaly [Abnormal Walk] : normal gait [No Focal Deficits] : no focal deficits [Oriented To Time, Place, And Person] : oriented to person, place, and time [Skin Color & Pigmentation] : normal skin color and pigmentation [FreeTextEntry1] : patient examined in left lateral decubitus. Area of swelling with marked tenderness noted posteriorly on palpation. No fissure or thrombosed hemorrhoid

## 2022-03-25 NOTE — HISTORY OF PRESENT ILLNESS
[de-identified] : DIXIE SALDIVAR is a 62 year-old man who was referred by Dr. Obdulio Taylor for further management of a perianal abscess. Patient started having perianal symptoms about 5 days prior that started during a bowel movement. There was no blood or recent changes in stool consistency or caliber prior. No fevers or chills. He presented to the ED a few days ago and was discharged with a presumed diagnosis of anal fissure with plans to follow-up with GI vs surgery. He was seen by Dr. Taylor today and was immediately sent over to Surgery clinic. Patient is reporting 9/10 pain and has difficulty sitting. Relatively constipation over the past few days. No other major concerns at this time. Of note, patient reports that latest blood sugars have been in the 300s. No prior colonoscopies.

## 2022-03-25 NOTE — PROCEDURE
[FreeTextEntry1] : Patient was placed into left lateral decubitus position. Fluctuance noted in posterior perianal region just to left of midline. After aspiration of pus with a 16G needle approximately 15 cc of 1% lidocaine infused into surrounding skin. \par \par An approximately 3cm radial incision was made and significant amount of pus (~50-75cc) of purulent fluid was expressed. Internal loculations were broken up bluntly using a clamp. Abscess cavity irrigated with normal saline, and no further purulent output was noted. \par \par Cavity loosely packed with gauze.

## 2022-03-30 ENCOUNTER — APPOINTMENT (OUTPATIENT)
Dept: SURGERY | Facility: CLINIC | Age: 62
End: 2022-03-30

## 2022-04-18 ENCOUNTER — APPOINTMENT (OUTPATIENT)
Dept: FAMILY MEDICINE | Facility: CLINIC | Age: 62
End: 2022-04-18

## 2022-04-18 ENCOUNTER — RX RENEWAL (OUTPATIENT)
Age: 62
End: 2022-04-18

## 2022-04-22 ENCOUNTER — APPOINTMENT (OUTPATIENT)
Dept: GASTROENTEROLOGY | Facility: CLINIC | Age: 62
End: 2022-04-22

## 2022-05-13 ENCOUNTER — RX RENEWAL (OUTPATIENT)
Age: 62
End: 2022-05-13

## 2022-05-13 ENCOUNTER — APPOINTMENT (OUTPATIENT)
Dept: FAMILY MEDICINE | Facility: CLINIC | Age: 62
End: 2022-05-13
Payer: MEDICAID

## 2022-05-13 VITALS
HEIGHT: 73 IN | HEART RATE: 97 BPM | SYSTOLIC BLOOD PRESSURE: 108 MMHG | OXYGEN SATURATION: 96 % | TEMPERATURE: 96.6 F | RESPIRATION RATE: 16 BRPM | DIASTOLIC BLOOD PRESSURE: 70 MMHG | WEIGHT: 238 LBS | BODY MASS INDEX: 31.54 KG/M2

## 2022-05-13 PROCEDURE — 99214 OFFICE O/P EST MOD 30 MIN: CPT

## 2022-05-13 RX ORDER — POLYETHYLENE GLYCOL 3350 17 G/17G
17 POWDER, FOR SOLUTION ORAL DAILY
Qty: 1 | Refills: 1 | Status: COMPLETED | COMMUNITY
Start: 2022-03-25 | End: 2022-05-13

## 2022-05-16 ENCOUNTER — RX RENEWAL (OUTPATIENT)
Age: 62
End: 2022-05-16

## 2022-05-17 ENCOUNTER — APPOINTMENT (OUTPATIENT)
Dept: FAMILY MEDICINE | Facility: CLINIC | Age: 62
End: 2022-05-17
Payer: MEDICAID

## 2022-05-17 DIAGNOSIS — R39.15 URGENCY OF URINATION: ICD-10-CM

## 2022-05-17 LAB
ALBUMIN SERPL ELPH-MCNC: 4.6 G/DL
ALP BLD-CCNC: 61 U/L
ALT SERPL-CCNC: 19 U/L
ANION GAP SERPL CALC-SCNC: 13 MMOL/L
AST SERPL-CCNC: 16 U/L
BILIRUB SERPL-MCNC: 0.7 MG/DL
BUN SERPL-MCNC: 14 MG/DL
C PEPTIDE SERPL-MCNC: 2.3 NG/ML
CALCIUM SERPL-MCNC: 9.5 MG/DL
CHLORIDE SERPL-SCNC: 99 MMOL/L
CO2 SERPL-SCNC: 25 MMOL/L
CREAT SERPL-MCNC: 0.93 MG/DL
EGFR: 93 ML/MIN/1.73M2
ESTIMATED AVERAGE GLUCOSE: 372 MG/DL
GLUCOSE SERPL-MCNC: 548 MG/DL
HBA1C MFR BLD HPLC: 14.6 %
POTASSIUM SERPL-SCNC: 5.3 MMOL/L
PROT SERPL-MCNC: 6.9 G/DL
SODIUM SERPL-SCNC: 137 MMOL/L

## 2022-05-17 PROCEDURE — 99214 OFFICE O/P EST MOD 30 MIN: CPT

## 2022-05-17 PROCEDURE — 99204 OFFICE O/P NEW MOD 45 MIN: CPT

## 2022-05-17 RX ORDER — EMPAGLIFLOZIN 10 MG/1
10 TABLET, FILM COATED ORAL
Qty: 30 | Refills: 0 | Status: COMPLETED | COMMUNITY
Start: 2022-01-06 | End: 2022-05-17

## 2022-05-17 RX ORDER — FLUCONAZOLE 150 MG/1
150 TABLET ORAL
Qty: 1 | Refills: 0 | Status: COMPLETED | COMMUNITY
Start: 2022-05-13 | End: 2022-05-17

## 2022-05-17 RX ORDER — INSULIN LISPRO 100 [IU]/ML
100 INJECTION, SOLUTION INTRAVENOUS; SUBCUTANEOUS
Qty: 1 | Refills: 2 | Status: COMPLETED | COMMUNITY
Start: 2022-05-17 | End: 2022-05-17

## 2022-05-17 RX ORDER — AMOXICILLIN AND CLAVULANATE POTASSIUM 875; 125 MG/1; MG/1
875-125 TABLET, COATED ORAL
Qty: 8 | Refills: 0 | Status: COMPLETED | COMMUNITY
Start: 2022-03-25 | End: 2022-04-01

## 2022-05-17 NOTE — ASSESSMENT
[FreeTextEntry1] : PT WAS EDUCATED ON SIGNIFICANCE OF A1C OF 15.3% ON LONG AND SHORT TERM COMPLICATIONS \par EXPLAINED THE PATHOPHYSIOLOGY OF TYPE 2 DIABETES AND TREATMENT.  EXPLAINED THAT PEOPLE OFTEN NEED FOR MULTIPLE AGENTS IN ADDITION TO LIFESTYLE CHANGES TO CONTROL BG AND HELP PREVENT COMPLICATIONS. \par INSTRUCTED TO DRINK AT LEAST 64 - 80 oz WATER TODAY\par INSTRUCTED TO STOP JARDIANCE\par START METFORMIN  MG 1 TAB DAILY WITH DINNER.  PT GIVEN INSTRUCTIONS HOW TO TITRATE TO 2 TABS WITH BREAKFAST AND DINNER AS TOLERATED\par INCREASE BASAGLAR FROM 14 TO 20 UNITS QHS.  INCREASE BY 2 UNITS EVERY 5 DAYS UNTIL FASTING BG AROUND 150 MG/DL\par START HUMALOG (OR NOVOLOG OR ADMELOG) 5 UNITS PRE-MEAL\par EXPLAINED TO PT THAT THIS IS A STARTING DOSE AND WILL LIKELY BE INCREASED AS WE SEE BG READINGS AND RESPONSE TO INSULIN \par \par SAMPLE CHIVO 2 CGM PLACED ON LEFT UPPER ARM\par EDUCATED ON NEED TO SCAN AT LEAST QH 8 TO AVOID GAPS IN DATA\par INSTRUCTED TO REMOVE PRIOR TO ANY X-RAY, MRI OR CT SCAN\par EXPLAINED THAT CGM IS TESTING INTERSTITIAL FLUID, RATHER THAN THE CAPILLARY BLOOD MEASURED WHEN PT PERFORMS A FINGERSTICK\par INSTRUCTED TO VERIFY ANY RESULTS THAT SEEM INACCURATE OR IF HAVING FEELINGS OF LOW BG BY PERFORMING FINGERSTICK\par EDUCATED ON SIGNS AND SYMPTOMS OF SITE INFECTION AND IMPORTANCE OF REMOVING SENSOR AND CALLING OFFICE ASAP \par INSTRUCTED TO REMOVE SENSOR AFTER 14 DAYS.  SENSOR MAY BE DISCARDED.  STRESSED IMPORTANCE OF BRINGING READER TO NEXT APPT.  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  USE OF MEASURING CUPS, SMALLER PLATES AND EATING OUT.\par LABS ORDERED TO R/O TYPE 1 DUE TO SUDDEN WEIGHT LOSS AND INCREASE IN A1C\par CALL OFFICE IN 2 DAYS WITH SENSOR READING.  INSTRUCTED TO GO TO ED FOR ANY NAUSEA, VOMITING OR ABDOMINAL PAIN.\par WRITTEN INSTRUCTIONS GIVEN AND REVIEWED \par PT WAS ABLE TO "TEACH BACK" ALL INSTRUCTIONS \par SCHEDULED TO RETURN TO OFFICE ON 5/31/22\par  \par \par \par

## 2022-05-17 NOTE — PHYSICAL EXAM
[Alert] : alert [Obese] : obese [No Acute Distress] : no acute distress [Normal Sclera/Conjunctiva] : normal sclera/conjunctiva [No Respiratory Distress] : no respiratory distress [Normal Gait] : normal gait [Right foot was examined, including] : right foot ~C was examined, including visual inspection with sensory and pulse exams [Left foot was examined, including] : left foot ~C was examined, including visual inspection with sensory and pulse exams [Delayed in the Right Toes] : normal in the toes [Swelling] : not swollen [Delayed in the Left Toes] : normal in the toes [Diminished Throughout Both Feet] : normal tactile sensation with monofilament testing throughout both feet [2+] : 2+ in the dorsalis pedis [Oriented x3] : oriented to person, place, and time [Normal Affect] : the affect was normal [Recent Memory Normal] : recent memory was not impaired [Normal Insight/Judgement] : insight and judgment were intact [Normal Mood] : the mood was normal [Remote Memory Normal] : remote memory was not impaired [FreeTextEntry2] : MULTIPLE OVERGROWN NAILS WITH ONYCHOMYCOSIS [FreeTextEntry6] : MULTIPLE OVERGROWN NAILS WITH ONYCHOMYCOSIS

## 2022-05-17 NOTE — HISTORY OF PRESENT ILLNESS
[FreeTextEntry8] : Uncontrolled DM presents with rash noted on femoral folds, testicles and glans of penis. Patient is  suffering from urinary frequency and is incontinent. 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  Reports that is difficult to retract foreskin for the past several months. \par BS has yet to come down. BS averages in the 300's. \par \par Denies fever, chills, nausea or vomiting. \par \par

## 2022-05-17 NOTE — PHYSICAL EXAM
[No Acute Distress] : no acute distress [Well Nourished] : well nourished [Well Developed] : well developed [Well-Appearing] : well-appearing [Normal Sclera/Conjunctiva] : normal sclera/conjunctiva [PERRL] : pupils equal round and reactive to light [EOMI] : extraocular movements intact [Normal Outer Ear/Nose] : the outer ears and nose were normal in appearance [Normal Oropharynx] : the oropharynx was normal [No JVD] : no jugular venous distention [No Lymphadenopathy] : no lymphadenopathy [Supple] : supple [Thyroid Normal, No Nodules] : the thyroid was normal and there were no nodules present [No Respiratory Distress] : no respiratory distress  [No Accessory Muscle Use] : no accessory muscle use [Clear to Auscultation] : lungs were clear to auscultation bilaterally [Normal Rate] : normal rate  [Regular Rhythm] : with a regular rhythm [Normal S1, S2] : normal S1 and S2 [No Murmur] : no murmur heard [No Carotid Bruits] : no carotid bruits [No Abdominal Bruit] : a ~M bruit was not heard ~T in the abdomen [No Varicosities] : no varicosities [Pedal Pulses Present] : the pedal pulses are present [No Edema] : there was no peripheral edema [No Palpable Aorta] : no palpable aorta [No Extremity Clubbing/Cyanosis] : no extremity clubbing/cyanosis [Soft] : abdomen soft [Non Tender] : non-tender [Non-distended] : non-distended [No Masses] : no abdominal mass palpated [No HSM] : no HSM [Normal Bowel Sounds] : normal bowel sounds [Normal Posterior Cervical Nodes] : no posterior cervical lymphadenopathy [Normal Anterior Cervical Nodes] : no anterior cervical lymphadenopathy [No CVA Tenderness] : no CVA  tenderness [No Spinal Tenderness] : no spinal tenderness [No Joint Swelling] : no joint swelling [Grossly Normal Strength/Tone] : grossly normal strength/tone [No Rash] : no rash [Coordination Grossly Intact] : coordination grossly intact [No Focal Deficits] : no focal deficits [Normal Gait] : normal gait [Deep Tendon Reflexes (DTR)] : deep tendon reflexes were 2+ and symmetric [Normal Affect] : the affect was normal [Normal Insight/Judgement] : insight and judgment were intact [FreeTextEntry1] : very difficult and painful to retract foreskin  [de-identified] : Erythematous rash noted on testicles, glans of penis and in pelvic fold. NO warmth, induration or tenderness noted.

## 2022-05-17 NOTE — REVIEW OF SYSTEMS
[Fatigue] : fatigue [Recent Weight Loss (___ Lbs)] : recent weight loss: [unfilled] lbs [Blurred Vision] : blurred vision [Chest Pain] : no chest pain [Shortness Of Breath] : no shortness of breath [Nausea] : no nausea [Constipation] : constipation [Abdominal Pain] : no abdominal pain [Vomiting] : no vomiting [Diarrhea] : no diarrhea [Gas/Bloating] : no gas/bloating [Polyuria] : polyuria [Dysuria] : no dysuria [Nocturia] : nocturia [Headaches] : no headaches [Polydipsia] : polydipsia

## 2022-05-17 NOTE — ASSESSMENT
[FreeTextEntry1] : Tinea cruris - pulse dosage of diflucan, ketoconazole ointment \par Keep area dry\par continue to apply lotion two rash two weeks after it resolves\par \par pending appointment with diabetic educator \par BS uncontrolled- will increase basal insulin to 14 units and he will fu with educator for further evaluaiton\par \par fu in 2-4 weeks for reassessment of tinea infection.\par \par Note phimosis - if persists - FU with urology!

## 2022-05-17 NOTE — HISTORY OF PRESENT ILLNESS
[FreeTextEntry1] : HERE TODAY FOR INITIAL DIABETES EVALUATION\par DX WITH TYPE 2 DIABETES 14 YEARS AGO WITH A1C 13%.  AND WAS SEEING DR FISCHER (ENDO) AND GOT A1C TO GOAL RANGE W/ DIET, METFORMIN AND INSULIN.  WAS TAKEN OFF ALL MEDS AND WAS DIET CONTROLLED. HAD NOT SEEN A PHYSICIAN x 10 YEARS.\par A1C NOW 14.6% UP FROM 6.8% JULY 2021\par LIVES WITH WITH SPOUSE AND CHILDREN 28 & 29 YEARS OLD.\par STATES SPOUSE IS CURRENTLY IN SUB-ACUTE REHAB.  HAS PANCREATIC CANCER WITH METS TO THE LIVER.  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  FATIGUE AND BLURRED VISION, SEVERE FUNGAL INFECTION\par WAS ON METFORMIN 1,000 MG BID WHICH WAS STOPPED SHORTLY AFTER IT STARTED DUE TO GI UPSET\par CURRENTLY TAKING PIOGLITIZONE 30 MG DAILY AND JARDIANCE 10 MG DAILY\par RECENTLY TREATED FOR SEVERE GENITAL FUNGAL  INFECTION\par ADHERENT WITH BASAGLAR 14 UNITS QHS\par TESTING BG 3-4 x WEEK WITH FASTING READINGS IN THE HIGH 300s, SOMETIMES 400s\par DENIES NAUSEA, VOMITING, DIARRHEA OR ABDOMINAL PAIN\par \par \par

## 2022-05-20 ENCOUNTER — NON-APPOINTMENT (OUTPATIENT)
Age: 62
End: 2022-05-20

## 2022-05-20 LAB — PANC ISLET CELL AB SER QL: NORMAL

## 2022-05-21 LAB — GAD65 AB SER-MCNC: 0.01 NMOL/L

## 2022-05-23 ENCOUNTER — NON-APPOINTMENT (OUTPATIENT)
Age: 62
End: 2022-05-23

## 2022-05-23 LAB — ZINC TRANSPORTER 8 AB: <15 U/ML

## 2022-05-25 ENCOUNTER — NON-APPOINTMENT (OUTPATIENT)
Age: 62
End: 2022-05-25

## 2022-05-25 ENCOUNTER — APPOINTMENT (OUTPATIENT)
Dept: FAMILY MEDICINE | Facility: CLINIC | Age: 62
End: 2022-05-25
Payer: MEDICAID

## 2022-05-25 PROCEDURE — 99215 OFFICE O/P EST HI 40 MIN: CPT | Mod: 25

## 2022-05-25 PROCEDURE — 95250 CONT GLUC MNTR PHYS/QHP EQP: CPT

## 2022-05-25 PROCEDURE — 95251 CONT GLUC MNTR ANALYSIS I&R: CPT

## 2022-05-25 NOTE — ASSESSMENT
[FreeTextEntry1] : PT ABLE TO DEMONSTRATE CORRECT TECHNIQUE FOR INSULIN ADMINISTRATION.  INSULIN STORED PROPERLY.  ROTATING INJECTION SITES.\par INSTRUCTED TO INCREASE BASAGLAR FROM 25 TO 35 UNITS QHS\par INCREASE ADMELOG FROM 8 TO 12 UNITS PRE-MEAL.  WILL GIVEN SCALE ONCE ACCURATE READINGS ESTABLISHED (NOW JUST SHOWING > 400)\par ENCOURAGED TO TITRATE METFORMIN  MG FROM 1 TAB BID TO 2 TABS BID\par INSTRUCTED TO DRINK AT LEAST 64 OZ WATER TODAY\par CONTINUE MONITORING.  CALL OFFICE IN 2 DAYS WITH READINGS\par INSTRUCTED TO GO TO ER FOR VOMITING, DIARRHEA OR ABDOMINAL PAIN\par EXPLAINED TO PT THAT IF BG IS NOT SIGNIFICANTLY DECREASED OVER THE NEXT FEW DAYS, HE MAY BENEFIT FROM IV HYDRATION TO BREAK THE GLUCOSE TOXICITY\par WRITTEN INSTRUCTIONS GIVEN AND REVIEWED \par PT WAS ABLE TO "TEACH BACK" ALL INSTRUCTIONS\par

## 2022-05-25 NOTE — REVIEW OF SYSTEMS
[Fatigue] : no fatigue [Blurred Vision] : no blurred vision [Chest Pain] : no chest pain [Shortness Of Breath] : no shortness of breath [Nausea] : no nausea [Diarrhea] : no diarrhea [Gas/Bloating] : no gas/bloating [Headaches] : no headaches [Polydipsia] : no polydipsia

## 2022-05-25 NOTE — HISTORY OF PRESENT ILLNESS
[FreeTextEntry1] : HERE TODAY FOR DIABETES FOLLOW UP\par FEELS WELL\par STATES POLYURIA AND POLYDIPSIA  IMPROVED AT PRESENT TIME\par DX WITH TYPE 2 DIABETES 14 YEARS AGO WITH A1C 13%. AND WAS SEEING DR FISCHER (ENDO) AND GOT A1C TO GOAL RANGE W/ DIET, METFORMIN AND INSULIN. WAS TAKEN OFF ALL MEDS AND WAS DIET CONTROLLED. HAD NOT SEEN A PHYSICIAN x 10 YEARS.\par A1C NOW 14.6% UP FROM 6.8% JULY 2021\par WORK UP FOR TYPE 1 DIABETES NEGATIVE\par REPORTS COMPLIANCE W/ BASAGLAR 25  UNITS DAILY \par REPORTS COMPLIANCE W/ ADMELOG 8  UNITS PRE-MEAL \par TOLERATING METFORMIN  MG 1 TAB W/ BREAKFAST AND DINNER - DENIES GI UPSET\par CHIVO 2 CGM PLACED 5/17/22.  PT STATES IT STOPPED WORKING AFTER 4 DAYS.   ALL READINGS > 350.\par WAS DOING FINGERSTICKS FOR PAST 3 DAYS W/ READINGS 340-380\par DENIES MISSING ANY INSULIN OR METFORMIN DOSES\par STATES HAS BEEN TRYING TO WATCH HIS DIET\par \par \par

## 2022-05-25 NOTE — PHYSICAL EXAM
[Foot Ulcers] : no foot ulcers [Diminished Throughout Both Feet] : normal tactile sensation with monofilament testing throughout both feet

## 2022-05-26 ENCOUNTER — NON-APPOINTMENT (OUTPATIENT)
Age: 62
End: 2022-05-26

## 2022-05-27 ENCOUNTER — NON-APPOINTMENT (OUTPATIENT)
Age: 62
End: 2022-05-27

## 2022-05-31 ENCOUNTER — APPOINTMENT (OUTPATIENT)
Dept: FAMILY MEDICINE | Facility: CLINIC | Age: 62
End: 2022-05-31
Payer: MEDICAID

## 2022-05-31 PROCEDURE — 99215 OFFICE O/P EST HI 40 MIN: CPT

## 2022-05-31 NOTE — HISTORY OF PRESENT ILLNESS
[FreeTextEntry1] : HERE TODAY FOR DIABETES FOLLOW UP\par FEELS WELL\par STATES POLYURIA AND POLYDIPSIA SOMEWHAT IMPROVED AT PRESENT TIME\par DX WITH TYPE 2 DIABETES 14 YEARS AGO WITH A1C 13%. AND WAS SEEING DR FISCHER (ENDO) AND GOT A1C TO GOAL RANGE W/ DIET, METFORMIN AND INSULIN. WAS TAKEN OFF ALL MEDS AND WAS DIET CONTROLLED. HAD NOT SEEN A PHYSICIAN x 10 YEARS.\par A1C NOW 14.6% UP FROM 6.8% JULY 2021\par WORK UP FOR TYPE 1 DIABETES NEGATIVE\par REPORTS COMPLIANCE W/ BASAGLAR 35 UNITS DAILY \par REPORTS COMPLIANCE W/ ADMELOG 12 UNITS PRE-MEAL \par MISSING 1 BASAGLAR DOSE (FELL ASLEEP) AND 2 PRE-MEAL DOSES AS HE LEFT PEN AT HOME\par STORING AND INJECTING INSULIN PROPERLY\par TOLERATING METFORMIN  MG 1 TAB W/ BREAKFAST AND DINNER - DENIES GI UPSET.  HAS NOT INCREASED TO 2 TABS BID.  WILL TRY THIS WEEK\par CHIVO 2 CGM RESULTS - ALL OVER 300.  AVERAGE 371.  VARIABILITY 8.4%\par REPORTS CUTTING OUT PASTA AND FATTY FOODS, DRINKS 1-2 LIGHT BEERS EVERY NIGHT\par DOES NOT EXERCISE \par

## 2022-05-31 NOTE — ASSESSMENT
[FreeTextEntry1] : LENGTHY DISCUSSION ON IMPORTANCE OF LOWERING BG\par STRESSED IMPORTANCE OF:\par    INCREASED WATER INTAKE TODAY TO HELP LOWER GLUCOSE\par    NOT MISSING INSULIN DOSES\par    INCREASED PHYSICAL ACTIVITY\par    CONSISTENT CARB DIET\par    INCREASING METFORMIN DOSE TO 1,000 MG BID\par PT STATES "NECK MASS" WAS UNDER EAR/JAW AND THOUGHT TO BE A LYMPH NODE.  ENCOURAGED TO OBTAIN NEW PRESCRIPTION FOR ULTRASOUND FROM DR MUÑOZ\par \par INSTRUCTED ON USE OF TRULICITY.    WILL START 0.75 MG WEEKLY x 4 WEEKS.  IF TOLERATING, WILL INCREASE TO 1.5 MG\par DENIES ANY PERSONAL OR FAMILY HX OF THYROID CANCER OR PANCREATITIS\par INSTRUCTED TO STORE PENS IN [place].  RECOMMEND THE DAY BEFORE OR DAY OF TO HAVE AT ROOM TEMP\par EXPLAINED THERE MAY BE SOME NAUSEA ASSOCIATED W/ THIS MEDICATION BUT USUALLY LESSENS IN TIME\par EDUCATED ON POTENTIAL SIDE EFFECTS.   INSTRUCTED TO REPORT ANY LUMP OR SWELLING IN THE NECK, HOARSENESS, TROUBLE SWALLOWING OR SHORTNESS OF BREATH, INTOLERABLE GI SIDE EFFECTS OR ABDOMINAL PAIN.  EDUCATED ON IMPORTANCE OF ADEQUATE HYDRATION AND NOT TAKING TRULICITY IF UNABLE TO EAT OR DRINK INSTRUCTED ON IMPORTANCE OF STOPPING MEDICATION  AND CALLING MD PROMPTLY \par PT WAS ABLE TO SUCCESSFULLY INJECT USING DEMO PILLOW USING GOOD TECHNIQUE\par INSTRUCTED TO INCREASE BASAGLAR FROM 35 TO 45 UNITS DAILY (DECREASE TO 40 UNITS WHEN TRULICITY STARTS)  \par INCREASE ADMELOG FROM 12 TO 17 UNITS PRE-MEALS (DECREASE TO 15 UNITS WHEN TRULICITY STARTS)\par .INSTRUCTED TO CALL OFFICE FOR BG < 70, > 250 OR FOR ANY QUESTIONS OR CONCERNS \par WRITTEN INSTRUCTIONS GIVEN AND REVIEWED \par ENCOURAGED SLOW, STEADY WEIGHT LOSS OF 1-2 LBS EACH WEEK\par ENCOURAGED PHYSICAL ACTIVITY 30 MINUTES 5 DAYS EACH WEEK AS TOLERATED \par WAS ABLE TO "TEACH BACK" ALL INSTRUCTIONS\par WILL FOLLOW UP VIA TELEPHONE BY END OF WEEK\par SCHEDULED TO RETURN TO OFFICE ON 6/24/22\par  [Diabetes Foot Care] : diabetes foot care [Carbohydrate Consistent Diet] : carbohydrate consistent diet [Importance of Diet and Exercise] : importance of diet and exercise to improve glycemic control, achieve weight loss and improve cardiovascular health [Exercise/Effect on Glucose] : exercise/effect on glucose [Weight Loss] : weight loss

## 2022-05-31 NOTE — REVIEW OF SYSTEMS
[Fatigue] : fatigue [Blurred Vision] : blurred vision [As Noted in HPI] : as noted in HPI [Chest Pain] : no chest pain [Shortness Of Breath] : no shortness of breath [Nausea] : no nausea [Diarrhea] : no diarrhea [Gas/Bloating] : no gas/bloating [Headaches] : no headaches

## 2022-05-31 NOTE — PHYSICAL EXAM
[Alert] : alert [Obese] : obese [No Acute Distress] : no acute distress [No Respiratory Distress] : no respiratory distress [Normal Gait] : normal gait [Oriented x3] : oriented to person, place, and time [Normal Affect] : the affect was normal [Recent Memory Normal] : recent memory was not impaired [Normal Insight/Judgement] : insight and judgment were intact [Normal Mood] : the mood was normal [Remote Memory Normal] : remote memory was not impaired [Foot Ulcers] : no foot ulcers

## 2022-06-01 ENCOUNTER — TRANSCRIPTION ENCOUNTER (OUTPATIENT)
Age: 62
End: 2022-06-01

## 2022-06-02 ENCOUNTER — APPOINTMENT (OUTPATIENT)
Dept: FAMILY MEDICINE | Facility: CLINIC | Age: 62
End: 2022-06-02
Payer: MEDICAID

## 2022-06-02 VITALS
OXYGEN SATURATION: 96 % | RESPIRATION RATE: 14 BRPM | SYSTOLIC BLOOD PRESSURE: 137 MMHG | HEART RATE: 78 BPM | WEIGHT: 247 LBS | TEMPERATURE: 97.9 F | DIASTOLIC BLOOD PRESSURE: 85 MMHG | BODY MASS INDEX: 32.74 KG/M2 | HEIGHT: 73 IN

## 2022-06-02 DIAGNOSIS — N48.1 BALANITIS: ICD-10-CM

## 2022-06-02 DIAGNOSIS — N47.1 PHIMOSIS: ICD-10-CM

## 2022-06-02 PROCEDURE — 99214 OFFICE O/P EST MOD 30 MIN: CPT

## 2022-06-02 NOTE — HISTORY OF PRESENT ILLNESS
[de-identified] : Rash on testicles and femoral folds have completely resolved. Still notes irritation of glans and tightness from foreskin.\par Urinary frequency and incontinence is improving with better glycemic control.\par He is following up with endocrine Elvira Martin. Patient reports he finally broke the 200's yesterday. Plan is to start trulicity when insurance approves it

## 2022-06-02 NOTE — ASSESSMENT
[FreeTextEntry1] : DM is improving. Continue fu with endocrine. Discussed cc diet and medical mgmt. Encouraged patient to start trulicity.\par \par Tinea cruris has resolved. Still noted with balanitis. Will continue with ketoconazole and patient to fu with urology for evaluation of phimosis \par \par Continue lipitor- FU in august to repeat lipids

## 2022-06-02 NOTE — PHYSICAL EXAM
[No Acute Distress] : no acute distress [Well Nourished] : well nourished [Well Developed] : well developed [Well-Appearing] : well-appearing [Normal Sclera/Conjunctiva] : normal sclera/conjunctiva [PERRL] : pupils equal round and reactive to light [EOMI] : extraocular movements intact [Normal Outer Ear/Nose] : the outer ears and nose were normal in appearance [Normal Oropharynx] : the oropharynx was normal [No JVD] : no jugular venous distention [No Lymphadenopathy] : no lymphadenopathy [Supple] : supple [Thyroid Normal, No Nodules] : the thyroid was normal and there were no nodules present [No Respiratory Distress] : no respiratory distress  [No Accessory Muscle Use] : no accessory muscle use [Clear to Auscultation] : lungs were clear to auscultation bilaterally [Normal Rate] : normal rate  [Regular Rhythm] : with a regular rhythm [Normal S1, S2] : normal S1 and S2 [No Murmur] : no murmur heard [No Carotid Bruits] : no carotid bruits [No Abdominal Bruit] : a ~M bruit was not heard ~T in the abdomen [No Varicosities] : no varicosities [Pedal Pulses Present] : the pedal pulses are present [No Edema] : there was no peripheral edema [No Palpable Aorta] : no palpable aorta [No Extremity Clubbing/Cyanosis] : no extremity clubbing/cyanosis [Soft] : abdomen soft [Non Tender] : non-tender [Non-distended] : non-distended [No Masses] : no abdominal mass palpated [No HSM] : no HSM [Normal Bowel Sounds] : normal bowel sounds [Normal Posterior Cervical Nodes] : no posterior cervical lymphadenopathy [Normal Anterior Cervical Nodes] : no anterior cervical lymphadenopathy [No CVA Tenderness] : no CVA  tenderness [No Spinal Tenderness] : no spinal tenderness [No Joint Swelling] : no joint swelling [Grossly Normal Strength/Tone] : grossly normal strength/tone [No Rash] : no rash [Coordination Grossly Intact] : coordination grossly intact [No Focal Deficits] : no focal deficits [Normal Gait] : normal gait [Deep Tendon Reflexes (DTR)] : deep tendon reflexes were 2+ and symmetric [Normal Affect] : the affect was normal [Normal Insight/Judgement] : insight and judgment were intact [FreeTextEntry1] : very difficult and painful to retract foreskin  [de-identified] : Erythematous rash noted only on  glans of penis

## 2022-06-23 ENCOUNTER — NON-APPOINTMENT (OUTPATIENT)
Age: 62
End: 2022-06-23

## 2022-06-24 ENCOUNTER — APPOINTMENT (OUTPATIENT)
Dept: FAMILY MEDICINE | Facility: CLINIC | Age: 62
End: 2022-06-24
Payer: MEDICAID

## 2022-06-24 DIAGNOSIS — Z87.898 PERSONAL HISTORY OF OTHER SPECIFIED CONDITIONS: ICD-10-CM

## 2022-06-24 PROCEDURE — 99215 OFFICE O/P EST HI 40 MIN: CPT

## 2022-06-24 NOTE — ASSESSMENT
[FreeTextEntry1] : BG MUCH IMPROVED\par 1 WEEK AFTER COMPLETING 4TH DOSE OF TRULICITY 0.75 MG, START TRULICITY 1.5 MG WEEKLY\par WHEN INCREASED DOSE OF TRULICITY STARTED, THEN DECREASE BASAGLAR FROM 45 TO 35 UNITS DAILY AND DECREASE ADMELOG FROM 17 TO 14 UNITS PRE-MEAL\par CALL OFFICE AS NEEDED TO DISCUSS\par ENCOURAGED SLOW, STEADY WEIGHT LOSS OF 1-2 LBS EACH WEEK\par ENCOURAGED PHYSICAL ACTIVITY 30 MINUTES 5 DAYS EACH WEEK AS TOLERATED\par LENGTHY DISCUSSION ON TIPS AND TECHNIQUES FOR CHOOSING SMALLER PORTIONS AND CHOOSING HIGHER QUALITY CARBOHYDRATES AND LEAN PROTEINS.  USE OF MEASURING CUPS, SMALLER PLATES AND EATING OUT. \par .INSTRUCTED TO CALL OFFICE FOR BG < 70, > 250 OR FOR ANY QUESTIONS OR CONCERNS \par WRITTEN INSTRUCTIONS GIVEN AND REVIEWED \par CALL OFFICE IN 4 WEEKS TO DISCUSS BG READINGS AND RESPONSE TO TREATMENT\par

## 2022-06-24 NOTE — HISTORY OF PRESENT ILLNESS
[FreeTextEntry1] : HERE TODAY FOR DIABETES FOLLOW UP\par FEELS WELL\par DENIES POLYURIA, POLYDIPSIA OR UNINTENTIONAL WEIGHT LOSS \par .REPORTS COMPLIANCE W/ METFORMIN  MG BEFORE BREAKFAST AND DINNER .  UNABLE TO TITRATE TO 1,000 MG BID DUE TO GI DISTRESS\par REPORTS COMPLIANCE W/ BASAGLAR  45 UNITS QHS AND ADMELOG 17 UNITS PRE-MEAL\par TOLERATING TRULICITY 0.75 MG WEEKLY.  DENIES NAUSEA, VOMITING OR ABDOMINAL PAIN \par CGM DOWNLOAD\par BG AVERAGE 213\par WORN FOR 14 DAYS\par BG    \par         < 54         0%\par          54-69      0%\par              39%\par         181-250   30%\par          > 250      31%\par GLUCOSE VARIABILITY 33.6% (TARGET LESS THAN OR EQUAL TO 36%) \par DOES NOT EXERCISE \par REPORTS BEING UNDER A GREAT DEAL OF STRESS AS SPOUSE HAS JUST BEEN PLACED ON COMFORT CARE.  EMOTIONAL SUPPORT GIVEN\par NEEDS OPTHO\par

## 2022-07-01 ENCOUNTER — RX RENEWAL (OUTPATIENT)
Age: 62
End: 2022-07-01

## 2022-08-02 ENCOUNTER — APPOINTMENT (OUTPATIENT)
Dept: FAMILY MEDICINE | Facility: CLINIC | Age: 62
End: 2022-08-02

## 2022-08-08 ENCOUNTER — RX RENEWAL (OUTPATIENT)
Age: 62
End: 2022-08-08

## 2022-08-22 ENCOUNTER — NON-APPOINTMENT (OUTPATIENT)
Age: 62
End: 2022-08-22

## 2022-09-06 ENCOUNTER — RX RENEWAL (OUTPATIENT)
Age: 62
End: 2022-09-06

## 2022-10-19 ENCOUNTER — APPOINTMENT (OUTPATIENT)
Dept: FAMILY MEDICINE | Facility: CLINIC | Age: 62
End: 2022-10-19

## 2022-10-19 PROCEDURE — 95251 CONT GLUC MNTR ANALYSIS I&R: CPT

## 2022-10-19 PROCEDURE — 99215 OFFICE O/P EST HI 40 MIN: CPT | Mod: 25

## 2022-10-19 RX ORDER — PIOGLITAZONE HYDROCHLORIDE 30 MG/1
30 TABLET ORAL
Qty: 30 | Refills: 2 | Status: COMPLETED | COMMUNITY
Start: 2022-01-18 | End: 2022-05-27

## 2022-10-20 NOTE — HISTORY OF PRESENT ILLNESS
[FreeTextEntry1] : HERE TODAY FOR DIABETES FOLLOW UP\par FEELS WELL\par DENIES POLYURIA, POLYDIPSIA OR UNINTENTIONAL WEIGHT LOSS \par SPOUSE  IN JULY\par .REPORTS COMPLIANCE W/ METFORMIN  MG BEFORE BREAKFAST AND DINNER . UNABLE TO TITRATE TO 1,000 MG BID DUE TO GI DISTRESS - ONLY TAKING 1 TAB TWICE DAILY ON MOST DAYS\par REPORTS COMPLIANCE W/ BASAGLAR 42 UNITS QHS.  TAKING  ADMELOG 17 UNITS PRE-MEAL BUT ONLY ONCE OR TWICE DAILY.  HAS IRREGULAR SCHEDULE DUE TO WORK - OFTEN WORKS LATE INTO THE NIGHT IR VERY EARLY IN THE MORNING.  EATS ON THE ROAD AND DOES NOT TAKE PRE-MEAL INSULIN WITH HIM.  REPORTS MAKING LESS THAN IDEAL FOOD CHOICES\par TOLERATING TRULICITY 1.5 MG WEEKLY. DENIES NAUSEA, VOMITING OR ABDOMINAL PAIN \par CGM DOWNLOAD\par BG AVERAGE 239    CGM ACTIVE 60%\par WORN FOR 14 DAYS\par BG \par  < 54 0%\par  54-69 0%\par   0%\par  181-250 36%\par  > 250 44%\par LOW  AND HIGH > 350\par GLUCOSE VARIABILITY 23.6% (TARGET LESS THAN OR EQUAL TO 36%) \par DOES NOT EXERCISE \par REPORTS BEING UNDER A GREAT DEAL OF STRESS AS SPOUSE  IN JULY AND HE'S  GRIEVING AND STILL ADJUSTING EMOTIONAL SUPPORT GIVEN\par NEEDS OPTHO\par REPORTS OCCASIONAL BRIGHT GREEN LOOSE STOOL.  DENIES CRAMPING, BLOOD OR MUCUS IN STOOL.  DENIES FEVER, NIGHT SWEATS, CHILLS, BODY ACHES OR ABDOMINAL PAIN. NO TRIGGERS IDENTIFIED \par

## 2022-10-20 NOTE — ASSESSMENT
[FreeTextEntry1] : DISCUSSED GENERAL A1C GOAL OF 7% AND BG GOALS  BEFORE MEALS AND < 180 2 HOURS AFTER MEALS TO HELP REDUCE INCIDENCE OF COMPLICATIONS \par LENGTHY DISCUSSION ON TIPS AND TECHNIQUES FOR CHOOSING SMALLER PORTIONS AND CHOOSING HIGHER QUALITY CARBOHYDRATES AND LEAN PROTEINS.  USE OF MEASURING CUPS, SMALLER PLATES AND EATING OUT.\par TREATMENT OPTIONS DISCUSSED TO HELP GET A1C CLOSER TO GOAL \par HOLD OFF ON SGLT2 DUE TO HX OF BALANITIS\par AGREED TO INCREASE IN TRULICITY FROM 1.5 MG TO 3 MG WEEKLY\par INCREASE BASAGLAR FROM 42 TO 50 UNITS QHS\par BRING ADMELOG WITH HIM WHEN NOT HOME AND BE SURE TO  TAKE PRE-MEAL.  CURRENTLY NOT INTERESTED IN USING SLIDING SCALE FOR PRE-MEAL\par TRY AND TITRATE METFORMIN  MG TO 2 TABS BID OR 1 TAB QID HOWEVER BEST TOLERATED\par ENCOURAGED SLOW, STEADY WEIGHT LOSS OF 1-2 LBS EACH WEEK\par ENCOURAGED PHYSICAL ACTIVITY 30 MINUTES 5 DAYS EACH WEEK AS TOLERATED \par .INSTRUCTED TO CALL OFFICE FOR BG < 70, > 250 OR FOR ANY QUESTIONS OR CONCERNS \par AGREED TO CALL OFFICE TO DISCUSS RESPONSE TO MEDICATION CHANGES AND IF FURTHER CHANGES NEEDED\par INSTRUCTED TO FOLLOW UP WITH PCP ASAP TO ADDRESS ISSUES W/ DISCOLORED STOOL.\par PT IN AGREEMENT W/ PLAN\par SCHEDULED TO RETURN TO OFFICE ON 11/28/22 [Diabetes Foot Care] : diabetes foot care [Carbohydrate Consistent Diet] : carbohydrate consistent diet [Exercise/Effect on Glucose] : exercise/effect on glucose [Action and use of Insulin] : action and use of short and long-acting insulin [Insulin Self-Administration] : insulin self-administration [Injection Technique, Storage, Sharps Disposal] : injection technique, storage, and sharps disposal [Retinopathy Screening] : Patient was referred to ophthalmology for retinopathy screening [Weight Loss] : weight loss [FreeTextEntry2] : WAS NOT HOLDING INSULIN IN PLACE x 10 SECONDS.  AGREED TO DO GOING FORWARD

## 2022-10-25 ENCOUNTER — RX RENEWAL (OUTPATIENT)
Age: 62
End: 2022-10-25

## 2022-11-28 ENCOUNTER — RX RENEWAL (OUTPATIENT)
Age: 62
End: 2022-11-28

## 2022-11-28 ENCOUNTER — APPOINTMENT (OUTPATIENT)
Dept: FAMILY MEDICINE | Facility: CLINIC | Age: 62
End: 2022-11-28

## 2022-11-28 PROCEDURE — 99215 OFFICE O/P EST HI 40 MIN: CPT | Mod: 25

## 2022-11-28 PROCEDURE — 83036 HEMOGLOBIN GLYCOSYLATED A1C: CPT | Mod: QW

## 2022-11-28 PROCEDURE — 95251 CONT GLUC MNTR ANALYSIS I&R: CPT

## 2022-11-28 RX ORDER — FLUCONAZOLE 150 MG/1
150 TABLET ORAL
Qty: 1 | Refills: 0 | Status: COMPLETED | COMMUNITY
Start: 2022-06-01 | End: 2022-06-30

## 2022-11-28 RX ORDER — DOCUSATE SODIUM 100 MG/1
100 CAPSULE, LIQUID FILLED ORAL
Qty: 60 | Refills: 0 | Status: COMPLETED | COMMUNITY
Start: 2022-03-22 | End: 2022-11-28

## 2022-11-28 RX ORDER — FLUTICASONE PROPIONATE AND SALMETEROL 250; 50 UG/1; UG/1
250-50 POWDER RESPIRATORY (INHALATION)
Qty: 1 | Refills: 6 | Status: COMPLETED | COMMUNITY
Start: 2019-09-09 | End: 2022-11-28

## 2022-11-28 NOTE — PHYSICAL EXAM
[Alert] : alert [Obese] : obese [No Acute Distress] : no acute distress [No Respiratory Distress] : no respiratory distress [Normal Gait] : normal gait [Foot Ulcers] : no foot ulcers [Oriented x3] : oriented to person, place, and time [Normal Affect] : the affect was normal [Recent Memory Normal] : recent memory was not impaired [Normal Insight/Judgement] : insight and judgment were intact [Normal Mood] : the mood was normal [Remote Memory Normal] : remote memory was not impaired

## 2022-11-28 NOTE — REVIEW OF SYSTEMS
[Fatigue] : no fatigue [Blurred Vision] : no blurred vision [Dysphagia] : no dysphagia [Chest Pain] : no chest pain [Shortness Of Breath] : no shortness of breath [Nausea] : no nausea [Diarrhea] : no diarrhea [Gas/Bloating] : no gas/bloating [Polyuria] : no polyuria [Joint Pain] : no joint pain [Headaches] : no headaches [Polydipsia] : no polydipsia

## 2022-11-28 NOTE — ASSESSMENT
[FreeTextEntry1] : A1C IN OFFICE TODAY 7.9% - DOWN FROM 14.6% 5/17/22\par CONGRATULATED PT ON ALL HIS EFFORTS TO IMPROVE GLYCEMIC CONTROL\par DISCUSSED GENERAL A1C GOAL OF 7% AND BG GOALS  BEFORE MEALS AND < 180 2 HOURS AFTER MEALS TO HELP REDUCE INCIDENCE OF COMPLICATIONS\par AGREED TO USE PRE-MEAL SCALE RATHER THAN STANDARD 17 UNITS OF PRE-MEAL ADMELOG\par INSTRUCTED TO TAKE          MEALTIME CORRECTION SCALE AS FOLLOWS FOR HIS TYPICAL CARB MEAL:\par BG    12   UNITS\par     151-200    14  UNITS\par     201-250    16 UNITS\par     251-300    18  UNITS\par     301-350     20 UNITS\par     351-400     22 UNITS\par OVER 401      24 UNITS AND CALL MD\par INSTRUCTED NOT TO TAKE IF NOT EATING WITHIN 15 MINUTES\par TAKE 2 UNITS MORE FOR HIGHER CARB MEALS\par ENCOURAGED SLOW, STEADY WEIGHT LOSS OF 1-2 LBS EACH WEEK\par ENCOURAGED PHYSICAL ACTIVITY 30 MINUTES 5 DAYS EACH WEEK AS TOLERATED \par ENCOURAGED TO USE CGM BEFORE AND 2 HOURS AFTER MEALS TO ESTABLISH PATTERNS FOR MEALS AND INSULIN DOSES.\par WRITTEN INSTRUCTIONS GIVEN AND REVIEWED \par PT WAS ABLE TO "TEACH BACK" ALL INSTRUCTIONS \par CALL OFFICE FOR BG < 70, > 250 OR FOR ANY QUESTIONS OR CONCERNS \par SCHEDULED TO RETURN TO OFFICE ON 1/23/23 [Diabetes Foot Care] : diabetes foot care [Long Term Vascular Complications] : long term vascular complications of diabetes [Carbohydrate Consistent Diet] : carbohydrate consistent diet [Importance of Diet and Exercise] : importance of diet and exercise to improve glycemic control, achieve weight loss and improve cardiovascular health [Exercise/Effect on Glucose] : exercise/effect on glucose [Hypoglycemia Management] : hypoglycemia management [Self Monitoring of Blood Glucose] : self monitoring of blood glucose [Retinopathy Screening] : Patient was referred to ophthalmology for retinopathy screening [Weight Loss] : weight loss

## 2022-11-28 NOTE — HISTORY OF PRESENT ILLNESS
[FreeTextEntry1] : HERE TODAY FOR DIABETES FOLLOW UP\par FEELS WELL\par DENIES POLYURIA, POLYDIPSIA OR UNINTENTIONAL WEIGHT LOSS \par .REPORTS COMPLIANCE W/ METFORMIN  MG BEFORE BREAKFAST AND DINNER . UNABLE TO TITRATE TO 1,000 MG BID DUE TO GI DISTRESS - ONLY TAKING 1 TAB TWICE DAILY ON MOST DAYS\par REPORTS COMPLIANCE W/ BASAGLAR 50 UNITS QHS. TAKING ADMELOG 17 UNITS PRE-MEAL (DID NOT WANT TO FOLLOW SCALE)\par  HAS IRREGULAR SCHEDULE DUE TO WORK - OFTEN WORKS LATE INTO THE NIGHT AND VERY EARLY IN THE MORNING. EATS ON THE ROAD AND DOES NOT TAKE PRE-MEAL INSULIN WITH HIM. REPORTS MAKING LESS THAN IDEAL FOOD CHOICES\par TOLERATING TRULICITY 3.0 MG WEEKLY. DENIES NAUSEA, VOMITING OR ABDOMINAL PAIN \par CGM DOWNLOAD\par BG AVERAGE 178 CGM ACTIVE 71%\par WORN FOR 14 DAYS\par BG \par  < 54 0%\par  54-69 0%\par   59%\par  181-250 33%\par  > 250 8%\par LOW  AND HIGH > 350\par GLUCOSE VARIABILITY 26.5% (TARGET LESS THAN OR EQUAL TO 36%) \par DOES NOT EXERCISE \par HAS SCHEDULED OPTHO APPT W/ DR PRESTON NEXT WEEK

## 2022-12-09 ENCOUNTER — NON-APPOINTMENT (OUTPATIENT)
Age: 62
End: 2022-12-09

## 2023-01-13 ENCOUNTER — TRANSCRIPTION ENCOUNTER (OUTPATIENT)
Age: 63
End: 2023-01-13

## 2023-01-17 ENCOUNTER — TRANSCRIPTION ENCOUNTER (OUTPATIENT)
Age: 63
End: 2023-01-17

## 2023-01-24 ENCOUNTER — APPOINTMENT (OUTPATIENT)
Dept: FAMILY MEDICINE | Facility: CLINIC | Age: 63
End: 2023-01-24
Payer: MEDICAID

## 2023-01-24 PROCEDURE — 99215 OFFICE O/P EST HI 40 MIN: CPT | Mod: 25

## 2023-01-24 PROCEDURE — 95251 CONT GLUC MNTR ANALYSIS I&R: CPT

## 2023-01-24 NOTE — ASSESSMENT
[Diabetes Foot Care] : diabetes foot care [Long Term Vascular Complications] : long term vascular complications of diabetes [Carbohydrate Consistent Diet] : carbohydrate consistent diet [Importance of Diet and Exercise] : importance of diet and exercise to improve glycemic control, achieve weight loss and improve cardiovascular health [Exercise/Effect on Glucose] : exercise/effect on glucose [Hypoglycemia Management] : hypoglycemia management [Action and use of Insulin] : action and use of short and long-acting insulin [Retinopathy Screening] : Patient was referred to ophthalmology for retinopathy screening [Weight Loss] : weight loss [0] : 2) Feeling down, depressed, or hopeless: Not at all (0) [PHQ-2 Negative - No further assessment needed] : PHQ-2 Negative - No further assessment needed [FreeTextEntry1] : CGM DOWNLOAD REVIEWED IN DETAIL\par MISSING SEVERAL SCANS.  WILL ORDER CHIVO 3 AS THIS DOES NOT NEED TO BE SCANNED.  PT NEEDS TO HAVE COMPLETE PICTURE OF DATA AS HE NEEDS FREQUENT ADJUSTMENTS IN INSULIN DOSING TO MINIMIZE HYPERGLYCEMIA\par INSTRUCTED TO INCREASE BASAGLAR FROM 50 TO 56 UNITS QHS\par INCREASE ADMELOG SCALE TO START AT 14 UNITS + 2 UNITS FOR EVERY 50 MG/DL > 150\par LENGTHY DISCUSSION ON TIPS AND TECHNIQUES FOR CHOOSING SMALLER PORTIONS AND CHOOSING HIGHER QUALITY CARBOHYDRATES AND LEAN PROTEINS.  USE OF MEASURING CUPS, SMALLER PLATES AND EATING OUT.\par SNACK LIST GIVEN AND REVIEWED\par ENCOURAGED SLOW, STEADY WEIGHT LOSS OF 1-2 LBS EACH WEEK\par ENCOURAGED PHYSICAL ACTIVITY 30 MINUTES 5 DAYS EACH WEEK AS TOLERATED \par .INSTRUCTED TO CALL OFFICE FOR BG < 70, > 250 OR FOR ANY QUESTIONS OR CONCERNS\par OBTAIN A1C ON OR ABOUT 3/1/23\par PT WAS ABLE TO "TEACH BACK" ALL INSTRUCTIONS \par SCHEDULED TO RETURN TO OFFICE ON 4/24/22 [TDW4Athog] : 0

## 2023-01-24 NOTE — HISTORY OF PRESENT ILLNESS
[FreeTextEntry1] : HERE TODAY FOR DIABETES FOLLOW UP\par FEELS WELL\par DENIES POLYURIA, POLYDIPSIA OR UNINTENTIONAL WEIGHT LOSS \par .REPORTS COMPLIANCE W/ METFORMIN  MG BEFORE BREAKFAST AND DINNER . UNABLE TO TITRATE TO 1,000 MG BID DUE TO GI DISTRESS - ONLY TAKING 1 TAB TWICE DAILY ON MOST DAYS\par REPORTS COMPLIANCE W/ BASAGLAR 50 UNITS QHS. TAKING ADMELOG SCALE STARTING WITH 12 UNITS PRE-MEAL + 2 UNITS FOR EVERY 50 MG/DL > 150\par  HAS IRREGULAR SCHEDULE DUE TO WORK - OFTEN WORKS LATE INTO THE NIGHT AND VERY EARLY IN THE MORNING. EATS ON THE ROAD AND DOES NOT TAKE PRE-MEAL INSULIN WITH HIM. REPORTS MAKING LESS THAN IDEAL FOOD CHOICES\par TOLERATING TRULICITY 3 MG WEEKLY. DENIES NAUSEA, VOMITING OR ABDOMINAL PAIN \par CGM DOWNLOAD\par BG AVERAGE 187 CGM ACTIVE 42%  - MUCH MISSING DATA.  WILL CONSIDER TRANSITIONING TO CHIVO 3 TO AVOID MISSED SCANS\par WORN FOR 14 DAYS\par BG \par  < 54 0%\par  54-69 0%\par   46% DOWN FROM 76% AT LAST DOWNLOAD\par  181-250 48%\par  > 250 6%\par GLUCOSE VARIABILITY 26.5% (TARGET LESS THAN OR EQUAL TO 36%) \par DOES NOT EXERCISE - STATES BEING MORE ACTIVE THAN USUAL\par NEED OPTHO\par

## 2023-01-24 NOTE — PHYSICAL EXAM
[Alert] : alert [No Acute Distress] : no acute distress [No Respiratory Distress] : no respiratory distress [Normal Gait] : normal gait [Oriented x3] : oriented to person, place, and time [Normal Affect] : the affect was normal [Recent Memory Normal] : recent memory was not impaired [Normal Insight/Judgement] : insight and judgment were intact [Normal Mood] : the mood was normal [Remote Memory Normal] : remote memory was not impaired [Foot Ulcers] : no foot ulcers [Over the Past 2 Weeks, Have You Felt Down, Depressed, or Hopeless?] : 1.) Over the past 2 weeks, have you felt down, depressed, or hopeless? No [Over the Past 2 Weeks, Have You Felt Little Interest or Pleasure Doing Things?] : 2.) Over the past 2 weeks, have you felt little interest or pleasure doing things? No

## 2023-01-24 NOTE — REVIEW OF SYSTEMS
[Fatigue] : no fatigue [Blurred Vision] : no blurred vision [Dysphagia] : no dysphagia [Chest Pain] : no chest pain [Palpitations] : no palpitations [Shortness Of Breath] : no shortness of breath [Nausea] : no nausea [Diarrhea] : no diarrhea [Gas/Bloating] : no gas/bloating [Polyuria] : no polyuria [Joint Pain] : no joint pain [Polydipsia] : no polydipsia

## 2023-02-17 ENCOUNTER — TRANSCRIPTION ENCOUNTER (OUTPATIENT)
Age: 63
End: 2023-02-17

## 2023-02-24 ENCOUNTER — TRANSCRIPTION ENCOUNTER (OUTPATIENT)
Age: 63
End: 2023-02-24

## 2023-03-27 ENCOUNTER — EMERGENCY (EMERGENCY)
Facility: HOSPITAL | Age: 63
LOS: 1 days | Discharge: HOME | End: 2023-03-27
Attending: INTERNAL MEDICINE | Admitting: INTERNAL MEDICINE
Payer: MEDICAID

## 2023-03-27 ENCOUNTER — NON-APPOINTMENT (OUTPATIENT)
Age: 63
End: 2023-03-27

## 2023-03-27 VITALS
DIASTOLIC BLOOD PRESSURE: 95 MMHG | TEMPERATURE: 98 F | HEART RATE: 75 BPM | WEIGHT: 253.09 LBS | SYSTOLIC BLOOD PRESSURE: 170 MMHG | RESPIRATION RATE: 15 BRPM | OXYGEN SATURATION: 99 % | HEIGHT: 73 IN

## 2023-03-27 LAB
ACETONE SERPL-MCNC: ABNORMAL
ALBUMIN SERPL ELPH-MCNC: 3.5 G/DL — SIGNIFICANT CHANGE UP (ref 3.3–5)
ALP SERPL-CCNC: 58 U/L — SIGNIFICANT CHANGE UP (ref 40–120)
ALT FLD-CCNC: 43 U/L — SIGNIFICANT CHANGE UP (ref 10–45)
ANION GAP SERPL CALC-SCNC: 9 MMOL/L — SIGNIFICANT CHANGE UP (ref 5–17)
APPEARANCE UR: CLEAR — SIGNIFICANT CHANGE UP
AST SERPL-CCNC: 24 U/L — SIGNIFICANT CHANGE UP (ref 10–40)
BASOPHILS # BLD AUTO: 0.04 K/UL — SIGNIFICANT CHANGE UP (ref 0–0.2)
BASOPHILS NFR BLD AUTO: 0.6 % — SIGNIFICANT CHANGE UP (ref 0–2)
BILIRUB SERPL-MCNC: 0.9 MG/DL — SIGNIFICANT CHANGE UP (ref 0.2–1.2)
BILIRUB UR-MCNC: NEGATIVE — SIGNIFICANT CHANGE UP
BUN SERPL-MCNC: 23 MG/DL — SIGNIFICANT CHANGE UP (ref 7–23)
CALCIUM SERPL-MCNC: 8.8 MG/DL — SIGNIFICANT CHANGE UP (ref 8.4–10.5)
CHLORIDE SERPL-SCNC: 104 MMOL/L — SIGNIFICANT CHANGE UP (ref 96–108)
CO2 SERPL-SCNC: 26 MMOL/L — SIGNIFICANT CHANGE UP (ref 22–31)
COLOR SPEC: YELLOW — SIGNIFICANT CHANGE UP
CREAT SERPL-MCNC: 1.2 MG/DL — SIGNIFICANT CHANGE UP (ref 0.5–1.3)
DIFF PNL FLD: NEGATIVE — SIGNIFICANT CHANGE UP
EGFR: 69 ML/MIN/1.73M2 — SIGNIFICANT CHANGE UP
EOSINOPHIL # BLD AUTO: 0.15 K/UL — SIGNIFICANT CHANGE UP (ref 0–0.5)
EOSINOPHIL NFR BLD AUTO: 2.2 % — SIGNIFICANT CHANGE UP (ref 0–6)
GLUCOSE BLDC GLUCOMTR-MCNC: 238 MG/DL — HIGH (ref 70–99)
GLUCOSE BLDC GLUCOMTR-MCNC: 299 MG/DL — HIGH (ref 70–99)
GLUCOSE BLDC GLUCOMTR-MCNC: 322 MG/DL — HIGH (ref 70–99)
GLUCOSE SERPL-MCNC: 388 MG/DL — HIGH (ref 70–99)
GLUCOSE UR QL: 1000 MG/DL
HCT VFR BLD CALC: 47.4 % — SIGNIFICANT CHANGE UP (ref 39–50)
HGB BLD-MCNC: 16.6 G/DL — SIGNIFICANT CHANGE UP (ref 13–17)
IMM GRANULOCYTES NFR BLD AUTO: 0.3 % — SIGNIFICANT CHANGE UP (ref 0–0.9)
KETONES UR-MCNC: ABNORMAL
LEUKOCYTE ESTERASE UR-ACNC: NEGATIVE — SIGNIFICANT CHANGE UP
LYMPHOCYTES # BLD AUTO: 1.98 K/UL — SIGNIFICANT CHANGE UP (ref 1–3.3)
LYMPHOCYTES # BLD AUTO: 29.6 % — SIGNIFICANT CHANGE UP (ref 13–44)
MAGNESIUM SERPL-MCNC: 1.8 MG/DL — SIGNIFICANT CHANGE UP (ref 1.6–2.6)
MCHC RBC-ENTMCNC: 30 PG — SIGNIFICANT CHANGE UP (ref 27–34)
MCHC RBC-ENTMCNC: 35 GM/DL — SIGNIFICANT CHANGE UP (ref 32–36)
MCV RBC AUTO: 85.7 FL — SIGNIFICANT CHANGE UP (ref 80–100)
MONOCYTES # BLD AUTO: 0.56 K/UL — SIGNIFICANT CHANGE UP (ref 0–0.9)
MONOCYTES NFR BLD AUTO: 8.4 % — SIGNIFICANT CHANGE UP (ref 2–14)
NEUTROPHILS # BLD AUTO: 3.94 K/UL — SIGNIFICANT CHANGE UP (ref 1.8–7.4)
NEUTROPHILS NFR BLD AUTO: 58.9 % — SIGNIFICANT CHANGE UP (ref 43–77)
NITRITE UR-MCNC: NEGATIVE — SIGNIFICANT CHANGE UP
NRBC # BLD: 0 /100 WBCS — SIGNIFICANT CHANGE UP (ref 0–0)
PH UR: 5 — SIGNIFICANT CHANGE UP (ref 5–8)
PHOSPHATE SERPL-MCNC: 3.9 MG/DL — SIGNIFICANT CHANGE UP (ref 2.5–4.5)
PLATELET # BLD AUTO: 177 K/UL — SIGNIFICANT CHANGE UP (ref 150–400)
POTASSIUM SERPL-MCNC: 4.2 MMOL/L — SIGNIFICANT CHANGE UP (ref 3.5–5.3)
POTASSIUM SERPL-SCNC: 4.2 MMOL/L — SIGNIFICANT CHANGE UP (ref 3.5–5.3)
PROT SERPL-MCNC: 6.8 G/DL — SIGNIFICANT CHANGE UP (ref 6–8.3)
PROT UR-MCNC: NEGATIVE — SIGNIFICANT CHANGE UP
RBC # BLD: 5.53 M/UL — SIGNIFICANT CHANGE UP (ref 4.2–5.8)
RBC # FLD: 12.3 % — SIGNIFICANT CHANGE UP (ref 10.3–14.5)
SODIUM SERPL-SCNC: 139 MMOL/L — SIGNIFICANT CHANGE UP (ref 135–145)
SP GR SPEC: 1.02 — SIGNIFICANT CHANGE UP (ref 1.01–1.02)
UROBILINOGEN FLD QL: NEGATIVE — SIGNIFICANT CHANGE UP
WBC # BLD: 6.69 K/UL — SIGNIFICANT CHANGE UP (ref 3.8–10.5)
WBC # FLD AUTO: 6.69 K/UL — SIGNIFICANT CHANGE UP (ref 3.8–10.5)

## 2023-03-27 PROCEDURE — 85025 COMPLETE CBC W/AUTO DIFF WBC: CPT

## 2023-03-27 PROCEDURE — 82009 KETONE BODYS QUAL: CPT

## 2023-03-27 PROCEDURE — 84100 ASSAY OF PHOSPHORUS: CPT

## 2023-03-27 PROCEDURE — 99284 EMERGENCY DEPT VISIT MOD MDM: CPT

## 2023-03-27 PROCEDURE — 93005 ELECTROCARDIOGRAM TRACING: CPT

## 2023-03-27 PROCEDURE — 93010 ELECTROCARDIOGRAM REPORT: CPT

## 2023-03-27 PROCEDURE — 36415 COLL VENOUS BLD VENIPUNCTURE: CPT

## 2023-03-27 PROCEDURE — 82962 GLUCOSE BLOOD TEST: CPT

## 2023-03-27 PROCEDURE — 83735 ASSAY OF MAGNESIUM: CPT

## 2023-03-27 PROCEDURE — 96360 HYDRATION IV INFUSION INIT: CPT

## 2023-03-27 PROCEDURE — 80053 COMPREHEN METABOLIC PANEL: CPT

## 2023-03-27 RX ORDER — SODIUM CHLORIDE 9 MG/ML
1000 INJECTION INTRAMUSCULAR; INTRAVENOUS; SUBCUTANEOUS ONCE
Refills: 0 | Status: COMPLETED | OUTPATIENT
Start: 2023-03-27 | End: 2023-03-27

## 2023-03-27 RX ORDER — INSULIN HUMAN 100 [IU]/ML
4 INJECTION, SOLUTION SUBCUTANEOUS ONCE
Refills: 0 | Status: DISCONTINUED | OUTPATIENT
Start: 2023-03-27 | End: 2023-03-31

## 2023-03-27 RX ORDER — SODIUM CHLORIDE 9 MG/ML
1000 INJECTION INTRAMUSCULAR; INTRAVENOUS; SUBCUTANEOUS ONCE
Refills: 0 | Status: DISCONTINUED | OUTPATIENT
Start: 2023-03-27 | End: 2023-03-31

## 2023-03-27 RX ADMIN — SODIUM CHLORIDE 1000 MILLILITER(S): 9 INJECTION INTRAMUSCULAR; INTRAVENOUS; SUBCUTANEOUS at 18:25

## 2023-03-27 NOTE — ED PROVIDER NOTE - ATTENDING APP SHARED VISIT CONTRIBUTION OF CARE
64 yo male with a medical history Including T2DM and Asthma, sent to ED by endocrinologist for elevated blood sugar in the 300s to 400s, patient is on Metformin 500mg daily, as well as basilar insulin 60 units per day and 18 units 3 times a day before meals, has not been able to get Trulicity.  Patient reports compliance, denies any SOB, Chest pain, dizziness, headache, visual changes, dry mouth, abdominal pain, n/v/d, reports some increased urination without hematuria or dysuria.    CBC, CMP, Serum Ketones, IVF, ECG, UA, Re-assess.  Anion gap normal, small ketones, : 2nd Unit IVF and 4 units Insulin IV given.  , Spoke with Elvira, Endocrinology NP: [phone]: Agreed with plan for 1 more liter of NS, 4 units of Insulin IV, then d/c home, no change to medication regimen, she will follow up with patient tomorrow Tuesday 3/28/23.   after fluids.  Dr. Garcia:  I have reviewed and discussed with the PA/ resident the case specifics, including the history, physical assessment, evaluation, conclusion, laboratory results, and medical plan. I agree with the contents, and conclusions. I have personally examined, and interviewed the patient.

## 2023-03-27 NOTE — ED PROVIDER NOTE - NSFOLLOWUPINSTRUCTIONS_ED_ALL_ED_FT
Follow up with your Endocrinologist Tomorrow (Tuesday) in the AM.    Continue to take your medications as prescribed.    Return to ED for any concerns.        Hyperglycemia  Hyperglycemia occurs when the level of sugar (glucose) in the blood is too high. Glucose is a type of sugar that provides the body's main source of energy. Certain hormones (insulin and glucagon) control the level of glucose in the blood. Insulin lowers blood glucose, and glucagon increases blood glucose. Hyperglycemia can result from not having enough insulin in the bloodstream, or from the body not responding normally to insulin.    Hyperglycemia occurs most often in people who have diabetes (diabetes mellitus), but it can happen in people who do not have diabetes. It can develop quickly, and it can be life-threatening if it causes you to become severely dehydrated (diabetic ketoacidosis or hyperglycemic hyperosmolar state). Severe hyperglycemia is a medical emergency.    For most people with diabetes, a blood glucose level above 240 mg/dL is considered hyperglycemia.    What are the causes?  If you have diabetes, hyperglycemia may be caused by:  Medicines that increase blood glucose or affect your diabetes control.  Getting less physical activity.  Eating more than planned.  Being sick or injured, having an infection, or having surgery.  Stress.  Not giving yourself enough insulin (if you are taking insulin).  If you have undiagnosed diabetes, this may be the reason you have hyperglycemia.    If you do not have diabetes, hyperglycemia may be caused by:  Certain medicines, including:  Steroid medicines.  Beta-blockers.  Epinephrine.  Thiazide diuretics.  Stress.  Having a serious illness, an infection, or surgery.  Diseases of the pancreas.  What increases the risk?  Hyperglycemia is more likely to develop in people who have risk factors for diabetes, such as:  Having a family member with diabetes.  Certain conditions in which the body's disease-fighting system (immune system) attacks itself (autoimmune disorders).  Being overweight or obese.  Having an inactive (sedentary) lifestyle.  Having been diagnosed with insulin resistance.  Having a history of prediabetes, gestational diabetes, or polycystic ovarian syndrome (PCOS).  What are the signs or symptoms?  Hyperglycemia may not cause any symptoms. If you do have symptoms, they may include:  Increased thirst.  Needing to urinate more often than usual.  Hunger.  Feeling very tired.  Blurry vision.  Other symptoms may develop if hyperglycemia gets worse, such as:  Dry mouth.  Abdominal pain.  Loss of appetite.  Fruity-smelling breath.  Weakness.  Unexpected weight loss.  Tingling or numbness in the hands or feet.  Headache.  Cuts or bruises that are slow to heal.  How is this diagnosed?  Hyperglycemia is diagnosed with a blood test to measure your blood glucose level. This blood test is usually done while you are having symptoms. Your health care provider may also do a physical exam and review your medical history.    You may have more tests to determine the cause of your hyperglycemia, such as:  A fasting blood glucose (FBG) test. You will not be allowed to eat (you will fast) for at least 8 hours before a blood sample is taken.  An A1C blood test. This provides information about blood glucose control over the previous 2–3 months.  An oral glucose tolerance test (OGTT). This measures your blood glucose at two times:  After fasting. This is your baseline blood glucose level.  2 hours after drinking a beverage that contains glucose.  How is this treated?  Treatment depends on the cause of your hyperglycemia. Treatment may include:  Taking medicine to regulate your blood glucose levels. If you take insulin or other diabetes medicines, your medicine or dosage may be adjusted.  Lifestyle changes, such as exercising more, eating healthier foods, or losing weight.  Treating an illness or infection.  Checking your blood glucose more often.  Stopping or reducing steroid medicines.  If your hyperglycemia becomes severe and it results in diabetic ketoacidosis or hyperglycemic hyperosmolar state, you must be hospitalized and given IV fluids and IV insulin.    Follow these instructions at home:  General instructions    Take over-the-counter and prescription medicines only as told by your health care provider.  Do not use any products that contain nicotine or tobacco. These products include cigarettes, chewing tobacco, and vaping devices, such as e-cigarettes. If you need help quitting, ask your health care provider.  If you drink alcohol:  Limit how much you have to:  0–1 drink a day for women who are not pregnant.  0–2 drinks a day for men.  Know how much alcohol is in a drink. In the [place] one drink equals one 12 oz bottle of beer (355 mL), one 5 oz glass of wine (148 mL), or one 1½ oz glass of hard liquor (44 mL).  Learn to manage stress. If you need help with this, ask your health care provider.  Do exercises as told by your health care provider.  Keep all follow-up visits. This is important.  Eating and drinking      Maintain a healthy weight.  Stay hydrated, especially when you exercise, get sick, or spend time in hot temperatures.  Drink enough fluid to keep your urine pale yellow.  If you have diabetes:      Know the symptoms of hyperglycemia.  Follow your diabetes management plan as told by your health care provider. Make sure you:  Take your insulin and medicines as told.  Follow your exercise plan.  Follow your meal plan. Eat on time, and do not skip meals.  Check your blood glucose as often as told. Make sure to check your blood glucose before and after exercise. If you exercise longer or in a different way, check your blood glucose more often.  Follow your sick day plan whenever you cannot eat or drink normally. Make this plan in advance with your health care provider.  Share your diabetes management plan with people in your workplace, school, and household.  Check your urine for ketones when you are ill and as told by your health care provider.  Carry a medical alert card or wear medical alert jewelry.  Where to find more information  American Diabetes Association: www.diabetes.org    Contact a health care provider if:  Your blood glucose is at or above 240 mg/dL (13.3 mmol/L) for 2 days in a row.  You have problems keeping your blood glucose in your target range.  You have frequent episodes of hyperglycemia.  You have signs of illness, such as nausea, vomiting, or fever.  Get help right away if:  Your blood glucose monitor reads "high" even when you are taking insulin.  You have trouble breathing.  You have a change in how you think, feel, or act (mental status).  You have nausea or vomiting that does not go away.  These symptoms may represent a serious problem that is an emergency. Do not wait to see if the symptoms will go away. Get medical help right away. Call your local emergency services (911 in the [place] Do not drive yourself to the hospital.    Summary  Hyperglycemia occurs when the level of sugar (glucose) in the blood is too high.  Hyperglycemia can happen with or without diabetes, and severe hyperglycemia can be life-threatening.  Hyperglycemia is diagnosed with a blood test to measure your blood glucose level. This blood test is usually done while you are having symptoms. Your health care provider may also do a physical exam and review your medical history.  If you have diabetes, follow your diabetes management plan as told by your health care provider.  Contact your health care provider if you have problems keeping your blood glucose in your target range.  This information is not intended to replace advice given to you by your health care provider. Make sure you discuss any questions you have with your health care provider.    Document Revised: 10/01/2021 Document Reviewed: 10/01/2021

## 2023-03-27 NOTE — ED ADULT TRIAGE NOTE - AS HEIGHT TYPE
stated Mucosal Advancement Flap Text: Given the location of the defect, shape of the defect and the proximity to free margins a mucosal advancement flap was deemed most appropriate. Incisions were made with a 15 blade scalpel in the appropriate fashion along the cutaneous vermilion border and the mucosal lip. The remaining actinically damaged mucosal tissue was excised.  The mucosal advancement flap was then elevated to the gingival sulcus with care taken to preserve the neurovascular structures and advanced into the primary defect. Care was taken to ensure that precise realignment of the vermilion border was achieved.

## 2023-03-27 NOTE — ED PROVIDER NOTE - OBJECTIVE STATEMENT
62 yo male with a medical history Including T2DM and Asthma, sent to ED by endocrinologist for elevated blood sugar in the 300s to 400s, patient is on Metformin 500mg daily, as well as basilar insulin 60 units per day and 18 units 3 times a day before meals, has not been able to get Trulicity.  Patient reports compliance, denies any SOB, Chest pain, dizziness, headache, visual changes, dry mouth, abdominal pain, n/v/d, reports some increased urination without hematuria or dysuria.

## 2023-03-27 NOTE — ED PROVIDER NOTE - CLINICAL SUMMARY MEDICAL DECISION MAKING FREE TEXT BOX
62 yo male with a medical history Including T2DM and Asthma, sent to ED by endocrinologist for elevated blood sugar in the 300s to 400s, patient is on Metformin 500mg daily, as well as basilar insulin 60 units per day and 18 units 3 times a day before meals, has not been able to get Trulicity.  Patient reports compliance, denies any SOB, Chest pain, dizziness, headache, visual changes, dry mouth, abdominal pain, n/v/d, reports some increased urination without hematuria or dysuria.      CBC, CMP, Serum Ketones, IVF, ECG, UA, Re-assess.    Anion gap normal, small ketones, : 2nd Unit IVF and 4 units Insulin IV given.    , Spoke with Elvira, Endocrinology NP: [phone]: Agreed with plan for 1 more liter of NS, 4 units of Insulin IV, then d/c home, no change to medication regimen, she will follow up with patient tomorrow Tuesday 3/28/23. 62 yo male with a medical history Including T2DM and Asthma, sent to ED by endocrinologist for elevated blood sugar in the 300s to 400s, patient is on Metformin 500mg daily, as well as basilar insulin 60 units per day and 18 units 3 times a day before meals, has not been able to get Trulicity.  Patient reports compliance, denies any SOB, Chest pain, dizziness, headache, visual changes, dry mouth, abdominal pain, n/v/d, reports some increased urination without hematuria or dysuria.      CBC, CMP, Serum Ketones, IVF, ECG, UA, Re-assess.    Anion gap normal, small ketones, : 2nd Unit IVF and 4 units Insulin IV given.    , Spoke with Elvira, Endocrinology NP: [phone]: Agreed with plan for 1 more liter of NS, 4 units of Insulin IV, then d/c home, no change to medication regimen, she will follow up with patient tomorrow Tuesday 3/28/23.     after fluids.

## 2023-03-27 NOTE — ED ADULT NURSE NOTE - OBJECTIVE STATEMENT
Pt referred to ED by PMD for hyperglycemia.  Hx of DM, takes Metformin, Basilar and Ademlog pre-meal insulin three times a day.  Pt reports stopped Trulicity 2 months prior due to unavailability.  Reports increasing blood sugar levels of 300's, noted on self monitoring device on left arm.  Pt denies any n/v.  Sent by PMD to ED for IV fluid hydration.

## 2023-03-27 NOTE — ED PROVIDER NOTE - CPE EDP EYES NORM
----- Message from Shahnaz Almeida LPN sent at 2/15/2023  4:57 PM CST -----  Regarding: ORALIA Waller 2/27/23 @10:20a  Are there any outstanding tasks in the chart? Pt will need fasting labs    Is there any documentation of tasks? no    Has patient seen another physician, been to the ER, C, or admitted to hospital since last visit?    Has the patient done blood work or imaging since last visit?        normal...

## 2023-03-27 NOTE — ED PROVIDER NOTE - PATIENT PORTAL LINK FT
You can access the FollowMyHealth Patient Portal offered by [facility] by registering at the following website: [email or web address]. By joining [facility] FollowMyHealth portal, you will also be able to view your health information using other applications (apps) compatible with our system.

## 2023-03-28 ENCOUNTER — NON-APPOINTMENT (OUTPATIENT)
Age: 63
End: 2023-03-28

## 2023-03-28 RX ORDER — INSULIN GLARGINE 100 [IU]/ML
100 INJECTION, SOLUTION SUBCUTANEOUS
Qty: 2 | Refills: 3 | Status: COMPLETED | COMMUNITY
Start: 2022-03-08 | End: 2023-03-28

## 2023-03-28 RX ORDER — INSULIN LISPRO 100 U/ML
100 INJECTION, SOLUTION SUBCUTANEOUS
Qty: 2 | Refills: 3 | Status: COMPLETED | COMMUNITY
Start: 2022-05-17 | End: 2023-03-28

## 2023-04-06 NOTE — DISCHARGE NOTE PROVIDER - NSDCDCMDCOMP_GEN_ALL_CORE
Pt had elevated BP during HD, HD RN confirmed with pt that she usually gets her bp meds prior to HD. HD administered some during, writer completed remaining meds post HD.    Please continue to administer BP meds prior to HD.    1620: pt eating Whopper from [facility], stating she cant eat the food here. Education on Renal diet given.   This document is complete and the patient is ready for discharge.

## 2023-04-21 ENCOUNTER — NON-APPOINTMENT (OUTPATIENT)
Age: 63
End: 2023-04-21

## 2023-04-22 ENCOUNTER — NON-APPOINTMENT (OUTPATIENT)
Age: 63
End: 2023-04-22

## 2023-04-24 ENCOUNTER — APPOINTMENT (OUTPATIENT)
Dept: FAMILY MEDICINE | Facility: CLINIC | Age: 63
End: 2023-04-24

## 2023-05-23 NOTE — ED PROVIDER NOTE - EKG DATE/TIME
Medication refilled   
Okay to refill x 1.  
Refill request from pharmacy for the medication listed below.    Patient was last seen 5/9/23 for a Pre-op.,    Next appt 6/6/23.      Last written asOutpatient Medication Detail     Disp Refills Start End    hydrOXYzine (ATARAX) 25 MG tablet 60 tablet 2 11/14/2022     Sig: TAKE 1 TABLET BY MOUTH THREE TIMES DAILY AS NEEDED FOR ANXIETY        Refill forwarded to provider for approval    Pharmacy: [facility]  Call when complete?  no    
19-Jun-2019 12:15

## 2023-06-11 ENCOUNTER — TRANSCRIPTION ENCOUNTER (OUTPATIENT)
Age: 63
End: 2023-06-11

## 2023-08-02 ENCOUNTER — TRANSCRIPTION ENCOUNTER (OUTPATIENT)
Age: 63
End: 2023-08-02

## 2023-08-04 ENCOUNTER — NON-APPOINTMENT (OUTPATIENT)
Age: 63
End: 2023-08-04

## 2023-08-07 ENCOUNTER — RX RENEWAL (OUTPATIENT)
Age: 63
End: 2023-08-07

## 2023-08-23 ENCOUNTER — RX RENEWAL (OUTPATIENT)
Age: 63
End: 2023-08-23

## 2023-08-25 ENCOUNTER — TRANSCRIPTION ENCOUNTER (OUTPATIENT)
Age: 63
End: 2023-08-25

## 2023-08-25 ENCOUNTER — APPOINTMENT (OUTPATIENT)
Dept: FAMILY MEDICINE | Facility: CLINIC | Age: 63
End: 2023-08-25

## 2023-09-07 ENCOUNTER — TRANSCRIPTION ENCOUNTER (OUTPATIENT)
Age: 63
End: 2023-09-07

## 2023-09-08 ENCOUNTER — TRANSCRIPTION ENCOUNTER (OUTPATIENT)
Age: 63
End: 2023-09-08

## 2023-09-13 ENCOUNTER — NON-APPOINTMENT (OUTPATIENT)
Age: 63
End: 2023-09-13

## 2023-09-19 ENCOUNTER — TRANSCRIPTION ENCOUNTER (OUTPATIENT)
Age: 63
End: 2023-09-19

## 2023-09-22 ENCOUNTER — EMERGENCY (EMERGENCY)
Facility: HOSPITAL | Age: 63
LOS: 1 days | Discharge: HOME | End: 2023-09-22
Attending: STUDENT IN AN ORGANIZED HEALTH CARE EDUCATION/TRAINING PROGRAM | Admitting: EMERGENCY MEDICINE
Payer: MEDICAID

## 2023-09-22 ENCOUNTER — APPOINTMENT (OUTPATIENT)
Dept: FAMILY MEDICINE | Facility: CLINIC | Age: 63
End: 2023-09-22
Payer: MEDICAID

## 2023-09-22 VITALS
RESPIRATION RATE: 15 BRPM | HEIGHT: 73 IN | TEMPERATURE: 98 F | HEART RATE: 94 BPM | DIASTOLIC BLOOD PRESSURE: 88 MMHG | SYSTOLIC BLOOD PRESSURE: 137 MMHG | OXYGEN SATURATION: 97 % | WEIGHT: 255.07 LBS

## 2023-09-22 LAB
ACETONE SERPL-MCNC: NEGATIVE — SIGNIFICANT CHANGE UP
ALBUMIN SERPL ELPH-MCNC: 3.8 G/DL — SIGNIFICANT CHANGE UP (ref 3.3–5)
ALP SERPL-CCNC: 55 U/L — SIGNIFICANT CHANGE UP (ref 40–120)
ALT FLD-CCNC: 50 U/L — HIGH (ref 10–45)
ANION GAP SERPL CALC-SCNC: 6 MMOL/L — SIGNIFICANT CHANGE UP (ref 5–17)
APPEARANCE UR: CLEAR — SIGNIFICANT CHANGE UP
AST SERPL-CCNC: 23 U/L — SIGNIFICANT CHANGE UP (ref 10–40)
BASOPHILS # BLD AUTO: 0.05 K/UL — SIGNIFICANT CHANGE UP (ref 0–0.2)
BASOPHILS NFR BLD AUTO: 0.7 % — SIGNIFICANT CHANGE UP (ref 0–2)
BILIRUB SERPL-MCNC: 1 MG/DL — SIGNIFICANT CHANGE UP (ref 0.2–1.2)
BILIRUB UR-MCNC: NEGATIVE — SIGNIFICANT CHANGE UP
BUN SERPL-MCNC: 21 MG/DL — SIGNIFICANT CHANGE UP (ref 7–23)
CALCIUM SERPL-MCNC: 9 MG/DL — SIGNIFICANT CHANGE UP (ref 8.4–10.5)
CHLORIDE SERPL-SCNC: 101 MMOL/L — SIGNIFICANT CHANGE UP (ref 96–108)
CO2 SERPL-SCNC: 31 MMOL/L — SIGNIFICANT CHANGE UP (ref 22–31)
COLOR SPEC: YELLOW — SIGNIFICANT CHANGE UP
CREAT SERPL-MCNC: 1.21 MG/DL — SIGNIFICANT CHANGE UP (ref 0.5–1.3)
DIFF PNL FLD: NEGATIVE — SIGNIFICANT CHANGE UP
EGFR: 68 ML/MIN/1.73M2 — SIGNIFICANT CHANGE UP
EOSINOPHIL # BLD AUTO: 0.17 K/UL — SIGNIFICANT CHANGE UP (ref 0–0.5)
EOSINOPHIL NFR BLD AUTO: 2.3 % — SIGNIFICANT CHANGE UP (ref 0–6)
GLUCOSE BLDC GLUCOMTR-MCNC: 280 MG/DL — HIGH (ref 70–99)
GLUCOSE SERPL-MCNC: 347 MG/DL — HIGH (ref 70–99)
GLUCOSE UR QL: >=1000 MG/DL
HBA1C MFR BLD HPLC: ABNORMAL
HCT VFR BLD CALC: 48.8 % — SIGNIFICANT CHANGE UP (ref 39–50)
HGB BLD-MCNC: 16.5 G/DL — SIGNIFICANT CHANGE UP (ref 13–17)
IMM GRANULOCYTES NFR BLD AUTO: 0.3 % — SIGNIFICANT CHANGE UP (ref 0–0.9)
KETONES UR-MCNC: ABNORMAL MG/DL
LEUKOCYTE ESTERASE UR-ACNC: NEGATIVE — SIGNIFICANT CHANGE UP
LYMPHOCYTES # BLD AUTO: 2.06 K/UL — SIGNIFICANT CHANGE UP (ref 1–3.3)
LYMPHOCYTES # BLD AUTO: 28 % — SIGNIFICANT CHANGE UP (ref 13–44)
MAGNESIUM SERPL-MCNC: 2 MG/DL — SIGNIFICANT CHANGE UP (ref 1.6–2.6)
MCHC RBC-ENTMCNC: 29.6 PG — SIGNIFICANT CHANGE UP (ref 27–34)
MCHC RBC-ENTMCNC: 33.8 GM/DL — SIGNIFICANT CHANGE UP (ref 32–36)
MCV RBC AUTO: 87.6 FL — SIGNIFICANT CHANGE UP (ref 80–100)
MONOCYTES # BLD AUTO: 0.67 K/UL — SIGNIFICANT CHANGE UP (ref 0–0.9)
MONOCYTES NFR BLD AUTO: 9.1 % — SIGNIFICANT CHANGE UP (ref 2–14)
NEUTROPHILS # BLD AUTO: 4.38 K/UL — SIGNIFICANT CHANGE UP (ref 1.8–7.4)
NEUTROPHILS NFR BLD AUTO: 59.6 % — SIGNIFICANT CHANGE UP (ref 43–77)
NITRITE UR-MCNC: NEGATIVE — SIGNIFICANT CHANGE UP
NRBC # BLD: 0 /100 WBCS — SIGNIFICANT CHANGE UP (ref 0–0)
PH UR: 5 — SIGNIFICANT CHANGE UP (ref 5–8)
PHOSPHATE SERPL-MCNC: 3.6 MG/DL — SIGNIFICANT CHANGE UP (ref 2.5–4.5)
PLATELET # BLD AUTO: 177 K/UL — SIGNIFICANT CHANGE UP (ref 150–400)
POTASSIUM SERPL-MCNC: 4.8 MMOL/L — SIGNIFICANT CHANGE UP (ref 3.5–5.3)
POTASSIUM SERPL-SCNC: 4.8 MMOL/L — SIGNIFICANT CHANGE UP (ref 3.5–5.3)
PROT SERPL-MCNC: 7.2 G/DL — SIGNIFICANT CHANGE UP (ref 6–8.3)
PROT UR-MCNC: NEGATIVE MG/DL — SIGNIFICANT CHANGE UP
RBC # BLD: 5.57 M/UL — SIGNIFICANT CHANGE UP (ref 4.2–5.8)
RBC # FLD: 12.7 % — SIGNIFICANT CHANGE UP (ref 10.3–14.5)
SODIUM SERPL-SCNC: 138 MMOL/L — SIGNIFICANT CHANGE UP (ref 135–145)
SP GR SPEC: 1.04 — HIGH (ref 1–1.03)
UROBILINOGEN FLD QL: 1 MG/DL — SIGNIFICANT CHANGE UP (ref 0.2–1)
WBC # BLD: 7.35 K/UL — SIGNIFICANT CHANGE UP (ref 3.8–10.5)
WBC # FLD AUTO: 7.35 K/UL — SIGNIFICANT CHANGE UP (ref 3.8–10.5)

## 2023-09-22 PROCEDURE — 95251 CONT GLUC MNTR ANALYSIS I&R: CPT

## 2023-09-22 PROCEDURE — 99215 OFFICE O/P EST HI 40 MIN: CPT | Mod: 25

## 2023-09-22 PROCEDURE — 83036 HEMOGLOBIN GLYCOSYLATED A1C: CPT | Mod: QW

## 2023-09-22 PROCEDURE — 36415 COLL VENOUS BLD VENIPUNCTURE: CPT

## 2023-09-22 PROCEDURE — 99284 EMERGENCY DEPT VISIT MOD MDM: CPT

## 2023-09-22 PROCEDURE — 84100 ASSAY OF PHOSPHORUS: CPT

## 2023-09-22 PROCEDURE — 82009 KETONE BODYS QUAL: CPT

## 2023-09-22 PROCEDURE — 82962 GLUCOSE BLOOD TEST: CPT

## 2023-09-22 PROCEDURE — 71045 X-RAY EXAM CHEST 1 VIEW: CPT | Mod: 26

## 2023-09-22 PROCEDURE — 80053 COMPREHEN METABOLIC PANEL: CPT

## 2023-09-22 PROCEDURE — 71045 X-RAY EXAM CHEST 1 VIEW: CPT

## 2023-09-22 PROCEDURE — 83930 ASSAY OF BLOOD OSMOLALITY: CPT

## 2023-09-22 PROCEDURE — 83735 ASSAY OF MAGNESIUM: CPT

## 2023-09-22 PROCEDURE — 99283 EMERGENCY DEPT VISIT LOW MDM: CPT

## 2023-09-22 PROCEDURE — 85025 COMPLETE CBC W/AUTO DIFF WBC: CPT

## 2023-09-22 RX ORDER — LANOLIN ALCOHOL/MO/W.PET/CERES
3 CREAM (GRAM) TOPICAL ONCE
Refills: 0 | Status: DISCONTINUED | OUTPATIENT
Start: 2023-09-22 | End: 2023-09-26

## 2023-09-22 RX ORDER — FLUCONAZOLE 150 MG/1
150 TABLET ORAL
Qty: 1 | Refills: 0 | Status: COMPLETED | COMMUNITY
Start: 2023-09-11 | End: 2023-09-13

## 2023-09-22 RX ORDER — SODIUM CHLORIDE 9 MG/ML
1000 INJECTION INTRAMUSCULAR; INTRAVENOUS; SUBCUTANEOUS ONCE
Refills: 0 | Status: COMPLETED | OUTPATIENT
Start: 2023-09-22 | End: 2023-09-22

## 2023-09-22 RX ORDER — DULAGLUTIDE 3 MG/.5ML
3 INJECTION, SOLUTION SUBCUTANEOUS
Qty: 1 | Refills: 0 | Status: COMPLETED | COMMUNITY
Start: 2022-05-31 | End: 2023-09-22

## 2023-09-22 RX ORDER — ACETAMINOPHEN 500 MG
650 TABLET ORAL ONCE
Refills: 0 | Status: DISCONTINUED | OUTPATIENT
Start: 2023-09-22 | End: 2023-09-26

## 2023-09-22 RX ADMIN — SODIUM CHLORIDE 1000 MILLILITER(S): 9 INJECTION INTRAMUSCULAR; INTRAVENOUS; SUBCUTANEOUS at 20:13

## 2023-09-22 NOTE — ED PROVIDER NOTE - OBJECTIVE STATEMENT
62 yo male with a medical history Including T2DM and Asthma, sent to ED by FP for A1C 13. mild headache. pts insulin has been adjusted over the last few months. send for IV hydration  denies any SOB, Chest pain, dizziness, headache, visual changes, dry mouth, abdominal pain, n/v/d

## 2023-09-22 NOTE — ED ADULT TRIAGE NOTE - CHIEF COMPLAINT QUOTE
Sent in by PCP to have blood work done and to be rehydrated as per pt. pt reports "my doc said my sugar is high". pt complaining of headache about 6/10 in pain.

## 2023-09-22 NOTE — ED PROVIDER NOTE - NSFOLLOWUPINSTRUCTIONS_ED_ALL_ED_FT
Hyperglycemia    Hyperglycemia occurs when the glucose (sugar) level in your blood is too high. Symptoms include increased urination, increased thirst, a dry mouth, or changes in appetite. If started on a medication, take exactly as prescribed by your health care professional. Maintain a healthy lifestyle and follow up with your primary care physician.    SEEK IMMEDIATE MEDICAL CARE IF YOU HAVE ANY OF THE FOLLOWING SYMPTOMS: shortness of breath, change in mental status, nausea or vomiting, fruity smell to your breath, or any signs of dehydration.       Please follow-up with your doctor(s) within the next 3 days, but see medical sooner if your symptoms persist or worsen.  Please call tomorrow for an appointment.    You were given a copy of your labs and/or imaging.  Please go-over these with your doctor(s).     If you have any worsening of symptoms or any other concerns please see your doctor or return to the ED immediately.    Please continue taking your home medications as directed.  Do not use alcohol when taking any medication (especially antibiotics, tylenol or other pain medication) unless you check with the doctor or pharmacist.

## 2023-09-22 NOTE — ED ADULT NURSE NOTE - OBJECTIVE STATEMENT
Patient came from home by PCP with complaint of blood work and be rehydrated. Patient has a hx of DM. Patient complaint of headache. Denies any nausea, vomit, chest or SOB.

## 2023-09-22 NOTE — ED PROVIDER NOTE - CLINICAL SUMMARY MEDICAL DECISION MAKING FREE TEXT BOX
64 yo male with a medical history Including T2DM and Asthma, sent to ED by FP for A1C 13. mild headache. pts insulin has been adjusted over the last few months. send for IV hydration  denies any SOB, Chest pain, dizziness, headache, visual changes, dry mouth, abdominal pain, n/v/d  normal vitals. not in DKA. will dc home  Discussed with patient need to return to ED if symptoms don't continue to improve or recur or develops any new or worsening symptoms that are of concern.?

## 2023-09-22 NOTE — ED ADULT NURSE NOTE - NSFALLUNIVINTERV_ED_ALL_ED
Bed/Stretcher in lowest position, wheels locked, appropriate side rails in place/Call bell, personal items and telephone in reach/Instruct patient to call for assistance before getting out of bed/chair/stretcher/Non-slip footwear applied when patient is off stretcher/[place] to call system/Physically safe environment - no spills, clutter or unnecessary equipment/Purposeful proactive rounding/Room/bathroom lighting operational, light cord in reach

## 2023-09-22 NOTE — ED PROVIDER NOTE - PATIENT PORTAL LINK FT
You can access the FollowMyHealth Patient Portal offered by [facility] by registering at the following website: [email or web address]. By joining [employer]’s FollowMyHealth portal, you will also be able to view your health information using other applications (apps) compatible with our system.

## 2023-09-23 LAB — OSMOLALITY SERPL: 303 MOSMOL/KG — HIGH (ref 280–301)

## 2023-09-26 ENCOUNTER — NON-APPOINTMENT (OUTPATIENT)
Age: 63
End: 2023-09-26

## 2023-09-29 ENCOUNTER — NON-APPOINTMENT (OUTPATIENT)
Age: 63
End: 2023-09-29

## 2023-09-29 ENCOUNTER — APPOINTMENT (OUTPATIENT)
Dept: FAMILY MEDICINE | Facility: CLINIC | Age: 63
End: 2023-09-29
Payer: MEDICAID

## 2023-09-29 VITALS
RESPIRATION RATE: 16 BRPM | OXYGEN SATURATION: 96 % | BODY MASS INDEX: 36.3 KG/M2 | WEIGHT: 268 LBS | SYSTOLIC BLOOD PRESSURE: 126 MMHG | TEMPERATURE: 97.3 F | DIASTOLIC BLOOD PRESSURE: 78 MMHG | HEIGHT: 72 IN | HEART RATE: 83 BPM

## 2023-09-29 DIAGNOSIS — R22.1 LOCALIZED SWELLING, MASS AND LUMP, NECK: ICD-10-CM

## 2023-09-29 DIAGNOSIS — R73.9 HYPERGLYCEMIA, UNSPECIFIED: ICD-10-CM

## 2023-09-29 DIAGNOSIS — N40.0 BENIGN PROSTATIC HYPERPLASIA WITHOUT LOWER URINARY TRACT SYMPMS: ICD-10-CM

## 2023-09-29 DIAGNOSIS — D22.9 MELANOCYTIC NEVI, UNSPECIFIED: ICD-10-CM

## 2023-09-29 PROCEDURE — 93000 ELECTROCARDIOGRAM COMPLETE: CPT

## 2023-09-29 PROCEDURE — 99386 PREV VISIT NEW AGE 40-64: CPT | Mod: 25

## 2023-10-05 ENCOUNTER — NON-APPOINTMENT (OUTPATIENT)
Age: 63
End: 2023-10-05

## 2023-10-05 ENCOUNTER — APPOINTMENT (OUTPATIENT)
Dept: CARDIOLOGY | Facility: CLINIC | Age: 63
End: 2023-10-05
Payer: MEDICAID

## 2023-10-05 VITALS — BODY MASS INDEX: 36.16 KG/M2 | HEART RATE: 57 BPM | WEIGHT: 267 LBS | OXYGEN SATURATION: 97 % | HEIGHT: 72 IN

## 2023-10-05 VITALS — HEART RATE: 64 BPM | DIASTOLIC BLOOD PRESSURE: 100 MMHG | SYSTOLIC BLOOD PRESSURE: 140 MMHG

## 2023-10-05 VITALS — SYSTOLIC BLOOD PRESSURE: 130 MMHG | DIASTOLIC BLOOD PRESSURE: 80 MMHG

## 2023-10-05 PROCEDURE — 99204 OFFICE O/P NEW MOD 45 MIN: CPT

## 2023-10-06 LAB
25(OH)D3 SERPL-MCNC: 28.7 NG/ML
ALBUMIN SERPL ELPH-MCNC: 4.3 G/DL
ALP BLD-CCNC: 54 U/L
ALT SERPL-CCNC: 52 U/L
ANION GAP SERPL CALC-SCNC: 12 MMOL/L
APPEARANCE: CLEAR
AST SERPL-CCNC: 24 U/L
BACTERIA: NEGATIVE /HPF
BASOPHILS # BLD AUTO: 0.04 K/UL
BASOPHILS NFR BLD AUTO: 0.6 %
BILIRUB SERPL-MCNC: 0.9 MG/DL
BILIRUBIN URINE: NEGATIVE
BLOOD URINE: NEGATIVE
BUN SERPL-MCNC: 19 MG/DL
CALCIUM SERPL-MCNC: 9 MG/DL
CAST: 0 /LPF
CHLORIDE SERPL-SCNC: 104 MMOL/L
CHOLEST SERPL-MCNC: 115 MG/DL
CO2 SERPL-SCNC: 22 MMOL/L
COLOR: YELLOW
CREAT SERPL-MCNC: 0.91 MG/DL
CREAT SPEC-SCNC: 191 MG/DL
EGFR: 95 ML/MIN/1.73M2
EOSINOPHIL # BLD AUTO: 0.16 K/UL
EOSINOPHIL NFR BLD AUTO: 2.5 %
EPITHELIAL CELLS: 2 /HPF
GLUCOSE QUALITATIVE U: 250 MG/DL
GLUCOSE SERPL-MCNC: 204 MG/DL
HCT VFR BLD CALC: 48.1 %
HDLC SERPL-MCNC: 38 MG/DL
HGB BLD-MCNC: 16.3 G/DL
IMM GRANULOCYTES NFR BLD AUTO: 0.3 %
KETONES URINE: ABNORMAL MG/DL
LDLC SERPL CALC-MCNC: 51 MG/DL
LEUKOCYTE ESTERASE URINE: NEGATIVE
LYMPHOCYTES # BLD AUTO: 1.7 K/UL
LYMPHOCYTES NFR BLD AUTO: 27 %
MAN DIFF?: NORMAL
MCHC RBC-ENTMCNC: 29.9 PG
MCHC RBC-ENTMCNC: 33.9 GM/DL
MCV RBC AUTO: 88.1 FL
MICROALBUMIN 24H UR DL<=1MG/L-MCNC: 1.6 MG/DL
MICROALBUMIN/CREAT 24H UR-RTO: 8 MG/G
MICROSCOPIC-UA: NORMAL
MONOCYTES # BLD AUTO: 0.53 K/UL
MONOCYTES NFR BLD AUTO: 8.4 %
NEUTROPHILS # BLD AUTO: 3.84 K/UL
NEUTROPHILS NFR BLD AUTO: 61.2 %
NITRITE URINE: NEGATIVE
NONHDLC SERPL-MCNC: 77 MG/DL
PH URINE: 5.5
PLATELET # BLD AUTO: 161 K/UL
POTASSIUM SERPL-SCNC: 4.4 MMOL/L
PROT SERPL-MCNC: 6.7 G/DL
PROTEIN URINE: NEGATIVE MG/DL
PSA SERPL-MCNC: 1.9 NG/ML
RBC # BLD: 5.46 M/UL
RBC # FLD: 13.4 %
RED BLOOD CELLS URINE: 2 /HPF
SODIUM SERPL-SCNC: 139 MMOL/L
SPECIFIC GRAVITY URINE: 1.03
TRIGL SERPL-MCNC: 158 MG/DL
TSH SERPL-ACNC: 0.83 UIU/ML
UROBILINOGEN URINE: 1 MG/DL
WBC # FLD AUTO: 6.29 K/UL
WHITE BLOOD CELLS URINE: 0 /HPF

## 2023-10-11 ENCOUNTER — APPOINTMENT (OUTPATIENT)
Dept: CARDIOLOGY | Facility: CLINIC | Age: 63
End: 2023-10-11
Payer: MEDICAID

## 2023-10-11 DIAGNOSIS — R06.09 OTHER FORMS OF DYSPNEA: ICD-10-CM

## 2023-10-11 DIAGNOSIS — R00.0 TACHYCARDIA, UNSPECIFIED: ICD-10-CM

## 2023-10-11 PROCEDURE — 93015 CV STRESS TEST SUPVJ I&R: CPT

## 2023-10-16 ENCOUNTER — NON-APPOINTMENT (OUTPATIENT)
Age: 63
End: 2023-10-16

## 2023-10-17 ENCOUNTER — APPOINTMENT (OUTPATIENT)
Dept: FAMILY MEDICINE | Facility: CLINIC | Age: 63
End: 2023-10-17
Payer: MEDICAID

## 2023-10-17 DIAGNOSIS — E11.9 TYPE 2 DIABETES MELLITUS W/OUT COMPLICATIONS: ICD-10-CM

## 2023-10-17 DIAGNOSIS — E11.65 TYPE 2 DIABETES MELLITUS WITH HYPERGLYCEMIA: ICD-10-CM

## 2023-10-17 DIAGNOSIS — Z79.4 TYPE 2 DIABETES MELLITUS W/OUT COMPLICATIONS: ICD-10-CM

## 2023-10-17 PROCEDURE — 99215 OFFICE O/P EST HI 40 MIN: CPT

## 2023-10-17 RX ORDER — CHOLECALCIFEROL (VITAMIN D3) 1250 MCG
1.25 MG CAPSULE ORAL
Qty: 4 | Refills: 0 | Status: COMPLETED | COMMUNITY
Start: 2021-03-29 | End: 2023-10-17

## 2023-10-17 RX ORDER — PEN NEEDLE, DIABETIC 29 G X1/2"
32G X 4 MM NEEDLE, DISPOSABLE MISCELLANEOUS
Qty: 1 | Refills: 0 | Status: COMPLETED | COMMUNITY
Start: 2022-03-11 | End: 2023-10-17

## 2023-10-17 RX ORDER — TAMSULOSIN HYDROCHLORIDE 0.4 MG/1
0.4 CAPSULE ORAL
Qty: 30 | Refills: 0 | Status: COMPLETED | COMMUNITY
Start: 2022-01-10 | End: 2023-10-17

## 2023-10-17 RX ORDER — ALBUTEROL SULFATE 90 UG/1
108 (90 BASE) INHALANT RESPIRATORY (INHALATION)
Qty: 1 | Refills: 2 | Status: COMPLETED | COMMUNITY
Start: 2020-06-29 | End: 2023-10-17

## 2023-11-06 ENCOUNTER — APPOINTMENT (OUTPATIENT)
Dept: CARDIOLOGY | Facility: CLINIC | Age: 63
End: 2023-11-06
Payer: MEDICAID

## 2023-11-06 PROCEDURE — 93306 TTE W/DOPPLER COMPLETE: CPT

## 2023-11-27 LAB
CREAT SPEC-SCNC: 194 MG/DL
ESTIMATED AVERAGE GLUCOSE: 174 MG/DL
HBA1C MFR BLD HPLC: 7.7 %
MICROALBUMIN 24H UR DL<=1MG/L-MCNC: 1.4 MG/DL
MICROALBUMIN/CREAT 24H UR-RTO: 7 MG/G

## 2023-12-05 ENCOUNTER — TRANSCRIPTION ENCOUNTER (OUTPATIENT)
Age: 63
End: 2023-12-05

## 2023-12-15 ENCOUNTER — APPOINTMENT (OUTPATIENT)
Dept: FAMILY MEDICINE | Facility: CLINIC | Age: 63
End: 2023-12-15
Payer: MEDICAID

## 2023-12-15 DIAGNOSIS — E11.69 TYPE 2 DIABETES MELLITUS WITH OTHER SPECIFIED COMPLICATION: ICD-10-CM

## 2023-12-15 DIAGNOSIS — E78.5 HYPERLIPIDEMIA, UNSPECIFIED: ICD-10-CM

## 2023-12-15 DIAGNOSIS — E55.9 VITAMIN D DEFICIENCY, UNSPECIFIED: ICD-10-CM

## 2023-12-15 DIAGNOSIS — E66.9 TYPE 2 DIABETES MELLITUS WITH OTHER SPECIFIED COMPLICATION: ICD-10-CM

## 2023-12-15 DIAGNOSIS — E66.9 OBESITY, UNSPECIFIED: ICD-10-CM

## 2023-12-15 PROCEDURE — 99215 OFFICE O/P EST HI 40 MIN: CPT | Mod: 25

## 2023-12-15 PROCEDURE — 95251 CONT GLUC MNTR ANALYSIS I&R: CPT

## 2023-12-15 RX ORDER — BLOOD SUGAR DIAGNOSTIC
STRIP MISCELLANEOUS
Qty: 1 | Refills: 2 | Status: COMPLETED | COMMUNITY
Start: 2021-03-26 | End: 2023-12-15

## 2023-12-15 RX ORDER — BLOOD-GLUCOSE METER
W/DEVICE EACH MISCELLANEOUS
Qty: 1 | Refills: 0 | Status: COMPLETED | COMMUNITY
Start: 2021-03-26 | End: 2023-12-15

## 2023-12-15 RX ORDER — LANCETS
EACH MISCELLANEOUS
Qty: 1 | Refills: 0 | Status: COMPLETED | COMMUNITY
Start: 2021-03-26 | End: 2023-12-15

## 2023-12-15 NOTE — ASSESSMENT
[Diabetes Foot Care] : diabetes foot care [Carbohydrate Consistent Diet] : carbohydrate consistent diet [Importance of Diet and Exercise] : importance of diet and exercise to improve glycemic control, achieve weight loss and improve cardiovascular health [Exercise/Effect on Glucose] : exercise/effect on glucose [Hypoglycemia Management] : hypoglycemia management [Weight Loss] : weight loss [FreeTextEntry1] : CONGRATULATED ON A1C REDUCTION DISCUSSED GENERAL A1C GOAL OF 7% AND BG GOALS  BEFORE MEALS AND < 180 2 HOURS AFTER MEALS TO HELP REDUCE INCIDENCE OF COMPLICATIONS OPTIONS DISCUSSED PT IN AGREEMENT TO INCREASE MOUNJARO FROM 7.5 TO 10 MG WEEKLY  INSTRUCTED TO COMPLETE LAST 7.5 MG DOSE NEXT WEEK AND THEN START THE 10 MG ONCE MOUNJARO 10 MG STARTED, DECREASE TOUJEO FROM 64 TO 50 UNITS QHS AND REDUCE HUMALOG U-200 PRE-MEAL TO START AT 8 RATHER THAN 12 UNITS + 2 UNIT FOR EVERY 50 MG/DL OVER 150  WRITTEN INSTRUCTIONS GIVEN AND REVIEWED PT WAS ABLE TO "TEACH BACK" ALL INSTRUCTIONS  ENCOURAGED SLOW, STEADY WEIGHT LOSS OF 1-2 LBS EACH WEEK ENCOURAGED PHYSICAL ACTIVITY 30 MINUTES 5 DAYS EACH WEEK AS TOLERATED  LENGTHY DISCUSSION ON TIPS AND TECHNIQUES FOR CHOOSING SMALLER PORTIONS AND CHOOSING HIGHER QUALITY CARBOHYDRATES AND LEAN PROTEINS.  USE OF MEASURING CUPS, SMALLER PLATES AND EATING OUT. .INSTRUCTED TO CALL OFFICE FOR BG < 70, > 250 OR FOR ANY QUESTIONS OR CONCERNS SCHEDULED TO RETURN TO OFFICE ON 3/15/24

## 2023-12-15 NOTE — REVIEW OF SYSTEMS
[Fatigue] : no fatigue [Blurred Vision] : no blurred vision [Dysphagia] : no dysphagia [Chest Pain] : no chest pain [Palpitations] : no palpitations [Shortness Of Breath] : no shortness of breath [Nausea] : no nausea [Diarrhea] : no diarrhea [Gas/Bloating] : no gas/bloating [Polyuria] : no polyuria [Joint Pain] : no joint pain [Depression] : no depression [Polydipsia] : no polydipsia

## 2023-12-15 NOTE — HISTORY OF PRESENT ILLNESS
[FreeTextEntry1] : HERE TODAY FOR DIABETES FOLLOW UP FEELS WELL DENIES POLYURIA, POLYDIPSIA OR UNINTENTIONAL WEIGHT LOSS A1C 11/22/23 7.7% DOWN FROM 13.3% .REPORTS COMPLIANCE W/ 1 TAB METFORMIN  MG BEFORE DINNER . UNABLE TO TITRATE TO 1,000 MG BID DUE TO GI DISTRESS - ONLY TAKING 1 TAB  DAILY ON MOST DAYS REPORTS COMPLIANCE W/ TOUJEO 64 UNITS QHS. TAKING ADMELOG SCALE STARTING WITH 12 UNITS PRE-MEAL + 2 UNITS FOR EVERY 50 MG/DL > 150. REPORTS BEING MORE CONSISTENT WITH TAKING PRE-MEAL INSULIN FOR ALL MEALS, RARELY MISSING DOSES HAS IRREGULAR SCHEDULE DUE TO WORK - OFTEN WORKS LATE INTO THE NIGHT AND VERY EARLY IN THE MORNING. EATS ON THE ROAD. TOLERATING MOUNJARO 7.5 MG WEEKLY. DENIES NAUSEA, VOMITING OR ABDOMINAL PAIN. UNABLE TO USE SGLT2 DUE TO FREQUENT BALANITIS BGs MUCH IMPROVED OVER THE PAST FEW WEEKS  CGM DOWNLOAD BG AVERAGE 126 IMPROVED FROM 352 CGM ACTIVE 95% WORN FOR 21 DAYS BG < 54 0% 54-69 1% - UNVERIFIED.  MAY BE DUE TO SESNOR COMPRESSION DURING SLEEP  87%  181-250 12%  > 250 0%  GLUCOSE VARIABILITY 25.2% (TARGET LESS THAN OR EQUAL TO 36%) DOES NOT EXERCISE UTD W/ OPTHO - NO RETINOPATHY

## 2023-12-18 ENCOUNTER — RX RENEWAL (OUTPATIENT)
Age: 63
End: 2023-12-18

## 2024-02-09 ENCOUNTER — RX RENEWAL (OUTPATIENT)
Age: 64
End: 2024-02-09

## 2024-02-14 ENCOUNTER — TRANSCRIPTION ENCOUNTER (OUTPATIENT)
Age: 64
End: 2024-02-14

## 2024-03-15 ENCOUNTER — APPOINTMENT (OUTPATIENT)
Dept: FAMILY MEDICINE | Facility: CLINIC | Age: 64
End: 2024-03-15

## 2024-04-11 ENCOUNTER — RX RENEWAL (OUTPATIENT)
Age: 64
End: 2024-04-11

## 2024-04-15 ENCOUNTER — TRANSCRIPTION ENCOUNTER (OUTPATIENT)
Age: 64
End: 2024-04-15

## 2024-04-19 ENCOUNTER — EMERGENCY (EMERGENCY)
Facility: HOSPITAL | Age: 64
LOS: 1 days | Discharge: HOME | End: 2024-04-19
Attending: INTERNAL MEDICINE | Admitting: INTERNAL MEDICINE
Payer: SELF-PAY

## 2024-04-19 ENCOUNTER — APPOINTMENT (OUTPATIENT)
Dept: FAMILY MEDICINE | Facility: CLINIC | Age: 64
End: 2024-04-19
Payer: COMMERCIAL

## 2024-04-19 VITALS
SYSTOLIC BLOOD PRESSURE: 132 MMHG | TEMPERATURE: 99.7 F | BODY MASS INDEX: 35.62 KG/M2 | HEART RATE: 122 BPM | OXYGEN SATURATION: 99 % | RESPIRATION RATE: 16 BRPM | HEIGHT: 72 IN | WEIGHT: 263 LBS | DIASTOLIC BLOOD PRESSURE: 80 MMHG

## 2024-04-19 VITALS
HEIGHT: 73 IN | DIASTOLIC BLOOD PRESSURE: 80 MMHG | TEMPERATURE: 101 F | OXYGEN SATURATION: 98 % | SYSTOLIC BLOOD PRESSURE: 132 MMHG | RESPIRATION RATE: 18 BRPM | HEART RATE: 117 BPM | WEIGHT: 263.01 LBS

## 2024-04-19 VITALS
HEART RATE: 78 BPM | RESPIRATION RATE: 20 BRPM | DIASTOLIC BLOOD PRESSURE: 68 MMHG | SYSTOLIC BLOOD PRESSURE: 125 MMHG | OXYGEN SATURATION: 98 %

## 2024-04-19 DIAGNOSIS — R35.0 FREQUENCY OF MICTURITION: ICD-10-CM

## 2024-04-19 DIAGNOSIS — R30.0 DYSURIA: ICD-10-CM

## 2024-04-19 LAB
ALBUMIN SERPL ELPH-MCNC: 3.6 G/DL — SIGNIFICANT CHANGE UP (ref 3.3–5)
ALP SERPL-CCNC: 65 U/L — SIGNIFICANT CHANGE UP (ref 40–120)
ALT FLD-CCNC: 28 U/L — SIGNIFICANT CHANGE UP (ref 10–45)
ANION GAP SERPL CALC-SCNC: 9 MMOL/L — SIGNIFICANT CHANGE UP (ref 5–17)
APPEARANCE UR: ABNORMAL
APTT BLD: 29.9 SEC — SIGNIFICANT CHANGE UP (ref 24.5–35.6)
AST SERPL-CCNC: 12 U/L — SIGNIFICANT CHANGE UP (ref 10–40)
BACTERIA # UR AUTO: ABNORMAL /HPF
BASOPHILS # BLD AUTO: 0.06 K/UL — SIGNIFICANT CHANGE UP (ref 0–0.2)
BASOPHILS NFR BLD AUTO: 0.3 % — SIGNIFICANT CHANGE UP (ref 0–2)
BILIRUB SERPL-MCNC: 3.2 MG/DL — HIGH (ref 0.2–1.2)
BILIRUB UR-MCNC: ABNORMAL
BUN SERPL-MCNC: 18 MG/DL — SIGNIFICANT CHANGE UP (ref 7–23)
CALCIUM SERPL-MCNC: 8.8 MG/DL — SIGNIFICANT CHANGE UP (ref 8.4–10.5)
CHLORIDE SERPL-SCNC: 98 MMOL/L — SIGNIFICANT CHANGE UP (ref 96–108)
CO2 SERPL-SCNC: 27 MMOL/L — SIGNIFICANT CHANGE UP (ref 22–31)
COLOR SPEC: ABNORMAL
CREAT SERPL-MCNC: 1.15 MG/DL — SIGNIFICANT CHANGE UP (ref 0.5–1.3)
DIFF PNL FLD: ABNORMAL
EGFR: 72 ML/MIN/1.73M2 — SIGNIFICANT CHANGE UP
EOSINOPHIL # BLD AUTO: 0 K/UL — SIGNIFICANT CHANGE UP (ref 0–0.5)
EOSINOPHIL NFR BLD AUTO: 0 % — SIGNIFICANT CHANGE UP (ref 0–6)
EPI CELLS # UR: 5 — SIGNIFICANT CHANGE UP
GLUCOSE SERPL-MCNC: 169 MG/DL — HIGH (ref 70–99)
GLUCOSE UR QL: NEGATIVE MG/DL — SIGNIFICANT CHANGE UP
HCT VFR BLD CALC: 48.8 % — SIGNIFICANT CHANGE UP (ref 39–50)
HGB BLD-MCNC: 17.1 G/DL — HIGH (ref 13–17)
IMM GRANULOCYTES NFR BLD AUTO: 1 % — HIGH (ref 0–0.9)
INR BLD: 1.23 RATIO — HIGH (ref 0.85–1.18)
KETONES UR-MCNC: 15 MG/DL
LACTATE SERPL-SCNC: 1.5 MMOL/L — SIGNIFICANT CHANGE UP (ref 0.7–2)
LEUKOCYTE ESTERASE UR-ACNC: ABNORMAL
LYMPHOCYTES # BLD AUTO: 1.18 K/UL — SIGNIFICANT CHANGE UP (ref 1–3.3)
LYMPHOCYTES # BLD AUTO: 6.4 % — LOW (ref 13–44)
MCHC RBC-ENTMCNC: 29.5 PG — SIGNIFICANT CHANGE UP (ref 27–34)
MCHC RBC-ENTMCNC: 35 GM/DL — SIGNIFICANT CHANGE UP (ref 32–36)
MCV RBC AUTO: 84.1 FL — SIGNIFICANT CHANGE UP (ref 80–100)
MONOCYTES # BLD AUTO: 1.9 K/UL — HIGH (ref 0–0.9)
MONOCYTES NFR BLD AUTO: 10.3 % — SIGNIFICANT CHANGE UP (ref 2–14)
NEUTROPHILS # BLD AUTO: 15.09 K/UL — HIGH (ref 1.8–7.4)
NEUTROPHILS NFR BLD AUTO: 82 % — HIGH (ref 43–77)
NITRITE UR-MCNC: POSITIVE
NRBC # BLD: 0 /100 WBCS — SIGNIFICANT CHANGE UP (ref 0–0)
PH UR: 5.5 — SIGNIFICANT CHANGE UP (ref 5–8)
PLATELET # BLD AUTO: 164 K/UL — SIGNIFICANT CHANGE UP (ref 150–400)
POTASSIUM SERPL-MCNC: 3.7 MMOL/L — SIGNIFICANT CHANGE UP (ref 3.5–5.3)
POTASSIUM SERPL-SCNC: 3.7 MMOL/L — SIGNIFICANT CHANGE UP (ref 3.5–5.3)
PROT SERPL-MCNC: 8 G/DL — SIGNIFICANT CHANGE UP (ref 6–8.3)
PROT UR-MCNC: 100 MG/DL
PROTHROM AB SERPL-ACNC: 14.3 SEC — HIGH (ref 9.5–13)
RBC # BLD: 5.8 M/UL — SIGNIFICANT CHANGE UP (ref 4.2–5.8)
RBC # FLD: 12.9 % — SIGNIFICANT CHANGE UP (ref 10.3–14.5)
RBC CASTS # UR COMP ASSIST: 10 /HPF — HIGH (ref 0–4)
SODIUM SERPL-SCNC: 134 MMOL/L — LOW (ref 135–145)
SP GR SPEC: 1.03 — SIGNIFICANT CHANGE UP (ref 1–1.03)
UROBILINOGEN FLD QL: 1 MG/DL — SIGNIFICANT CHANGE UP (ref 0.2–1)
WBC # BLD: 18.41 K/UL — HIGH (ref 3.8–10.5)
WBC # FLD AUTO: 18.41 K/UL — HIGH (ref 3.8–10.5)
WBC UR QL: 40 /HPF — HIGH (ref 0–5)

## 2024-04-19 PROCEDURE — 99285 EMERGENCY DEPT VISIT HI MDM: CPT

## 2024-04-19 PROCEDURE — 81001 URINALYSIS AUTO W/SCOPE: CPT

## 2024-04-19 PROCEDURE — 85610 PROTHROMBIN TIME: CPT

## 2024-04-19 PROCEDURE — 83605 ASSAY OF LACTIC ACID: CPT

## 2024-04-19 PROCEDURE — 87077 CULTURE AEROBIC IDENTIFY: CPT

## 2024-04-19 PROCEDURE — 93010 ELECTROCARDIOGRAM REPORT: CPT

## 2024-04-19 PROCEDURE — 71045 X-RAY EXAM CHEST 1 VIEW: CPT | Mod: 26

## 2024-04-19 PROCEDURE — 96365 THER/PROPH/DIAG IV INF INIT: CPT

## 2024-04-19 PROCEDURE — 96361 HYDRATE IV INFUSION ADD-ON: CPT

## 2024-04-19 PROCEDURE — 85025 COMPLETE CBC W/AUTO DIFF WBC: CPT

## 2024-04-19 PROCEDURE — 85730 THROMBOPLASTIN TIME PARTIAL: CPT

## 2024-04-19 PROCEDURE — 96375 TX/PRO/DX INJ NEW DRUG ADDON: CPT

## 2024-04-19 PROCEDURE — 99214 OFFICE O/P EST MOD 30 MIN: CPT

## 2024-04-19 PROCEDURE — 87086 URINE CULTURE/COLONY COUNT: CPT

## 2024-04-19 PROCEDURE — 99285 EMERGENCY DEPT VISIT HI MDM: CPT | Mod: 25

## 2024-04-19 PROCEDURE — 93005 ELECTROCARDIOGRAM TRACING: CPT

## 2024-04-19 PROCEDURE — 36415 COLL VENOUS BLD VENIPUNCTURE: CPT

## 2024-04-19 PROCEDURE — 80053 COMPREHEN METABOLIC PANEL: CPT

## 2024-04-19 PROCEDURE — 74176 CT ABD & PELVIS W/O CONTRAST: CPT | Mod: 26,MC

## 2024-04-19 PROCEDURE — 71045 X-RAY EXAM CHEST 1 VIEW: CPT

## 2024-04-19 PROCEDURE — 74176 CT ABD & PELVIS W/O CONTRAST: CPT | Mod: MC

## 2024-04-19 PROCEDURE — 87186 SC STD MICRODIL/AGAR DIL: CPT

## 2024-04-19 PROCEDURE — 87040 BLOOD CULTURE FOR BACTERIA: CPT

## 2024-04-19 PROCEDURE — 87150 DNA/RNA AMPLIFIED PROBE: CPT

## 2024-04-19 RX ORDER — ACETAMINOPHEN 500 MG
1000 TABLET ORAL ONCE
Refills: 0 | Status: COMPLETED | OUTPATIENT
Start: 2024-04-19 | End: 2024-04-19

## 2024-04-19 RX ORDER — CEFEPIME 1 G/1
1000 INJECTION, POWDER, FOR SOLUTION INTRAMUSCULAR; INTRAVENOUS ONCE
Refills: 0 | Status: COMPLETED | OUTPATIENT
Start: 2024-04-19 | End: 2024-04-19

## 2024-04-19 RX ORDER — CEFPODOXIME PROXETIL 100 MG
1 TABLET ORAL
Qty: 20 | Refills: 0
Start: 2024-04-19 | End: 2024-04-28

## 2024-04-19 RX ORDER — SODIUM CHLORIDE 9 MG/ML
2500 INJECTION INTRAMUSCULAR; INTRAVENOUS; SUBCUTANEOUS ONCE
Refills: 0 | Status: COMPLETED | OUTPATIENT
Start: 2024-04-19 | End: 2024-04-19

## 2024-04-19 RX ORDER — ACETAMINOPHEN 500 MG
1 TABLET ORAL
Qty: 30 | Refills: 0
Start: 2024-04-19 | End: 2024-04-28

## 2024-04-19 RX ADMIN — CEFEPIME 1000 MILLIGRAM(S): 1 INJECTION, POWDER, FOR SOLUTION INTRAMUSCULAR; INTRAVENOUS at 19:56

## 2024-04-19 RX ADMIN — Medication 1000 MILLIGRAM(S): at 20:01

## 2024-04-19 RX ADMIN — SODIUM CHLORIDE 2500 MILLILITER(S): 9 INJECTION INTRAMUSCULAR; INTRAVENOUS; SUBCUTANEOUS at 20:10

## 2024-04-19 RX ADMIN — Medication 400 MILLIGRAM(S): at 19:48

## 2024-04-19 RX ADMIN — CEFEPIME 100 MILLIGRAM(S): 1 INJECTION, POWDER, FOR SOLUTION INTRAMUSCULAR; INTRAVENOUS at 19:26

## 2024-04-19 RX ADMIN — SODIUM CHLORIDE 2500 MILLILITER(S): 9 INJECTION INTRAMUSCULAR; INTRAVENOUS; SUBCUTANEOUS at 18:55

## 2024-04-19 RX ADMIN — Medication 1000 MILLIGRAM(S): at 20:18

## 2024-04-19 NOTE — ED PROVIDER NOTE - CLINICAL SUMMARY MEDICAL DECISION MAKING FREE TEXT BOX
64-year-old male past history of prostate disease, Type 2 diabetes, dyslipidemia came to the emergency room sent from the urgent care for evaluation of pyelonephritis patient was having back pain body aches in the emergency room patient spiked a fever of 103,  UA is consistent with UTI white count is 18 and CT renal shows signs of pyelonephritis patient recommended to stay in the hospital for IV antibiotics patient declined patient explained the risk plan to discharge the patient on Vantin and Tylenol for the fever and patient recommended drink plenty fluids follow-up with primary care doctor

## 2024-04-19 NOTE — ED PROVIDER NOTE - NSFOLLOWUPINSTRUCTIONS_ED_ALL_ED_FT
The patient is clinically sober, A&Ox3, free from distracting injury.  Throughout our interactions in the ED today, the pt has demonstrated concrete thinking/reasoning, has maintained an orderly/reasonable conversation, appears to have intact insight/judgment/reason and therefore in our opinion has capacity to make decisions.  Given the patient's presentation, we have communicated our concern for _Severe sepsis disability and death__________.    The patient has verbalized an understanding of our concerns and still requests to leave against medical advice.  We have discussed the range of possible diagnoses, potential testing & treatment options.  We’ve made  numerous efforts to prevent the patient from leaving AMA.  Our discussions included the potential outcomes of leaving AMA, including worsening of their condition, becoming permanently disabled/in pain/critically ill, or death.  Despite these efforts, we were unable to convince the patient to stay.  The patient is refusing any  further care and is leaving against medical advice.  We have answered all questions and have implored the patient to return ASAP to complete the work up or with ANY worsening or new concerning symptoms.  Staff member _Andrea ackerman_________ witnessed the patient consenting to AMA.  Patient recommended to drink plenty of fluids Vantin 200 mg twice a day for 10 days and follow-up with the primary care doctor patient recommended to come back to the emergency room anytime his condition deteriorates

## 2024-04-19 NOTE — ED ADULT NURSE NOTE - NSFALLUNIVINTERV_ED_ALL_ED
Bed/Stretcher in lowest position, wheels locked, appropriate side rails in place/Call bell, personal items and telephone in reach/Instruct patient to call for assistance before getting out of bed/chair/stretcher/Non-slip footwear applied when patient is off stretcher/[place] to call system/Physically safe environment - no spills, clutter or unnecessary equipment/Purposeful proactive rounding/Room/bathroom lighting operational, light cord in reach

## 2024-04-19 NOTE — ED PROVIDER NOTE - OBJECTIVE STATEMENT
64-year-old male past history of prostate disease, Type 2 diabetes, dyslipidemia came to the emergency room sent from the urgent care for evaluation of pyelonephritis patient was having back pain body aches in the emergency room patient spiked a fever of 103,

## 2024-04-19 NOTE — ED ADULT NURSE NOTE - PRO INTERPRETER NEED 2
Infectious Disease NSICU NSICU NSICU NSICU NSICU NSICU NSICU NSICU NSICU NSICU NSICU NSICU NSICU NSICU NSICU NSICU NSICU NSICU NSICU NSICU NSICU NSICU NSICU English

## 2024-04-19 NOTE — ED ADULT NURSE NOTE - OBJECTIVE STATEMENT
64 year old Male, PMH: DM, comes to the ER with urinary frequency, urgency and dysuria since yesterday. Saw his primary care doctor today who said he has a kidney infection based on his urine sample provided today. Endorses headache as well. Has not taken anything for pain at home or prior to arrival. Patient otherwise healthy, A&Ox4, breathing spontaneous and unlabored on room air, saturating within normal limits, tachycardic to 120s on cardiac monitor, abdomen soft, nontender, moving all extremities, skin has no injuries. Rectal temperature 103.0. Denies chest pain, shortness of breath, abdominal pain, nausea, vomiting, diarrhea, dizziness, vision changes, chills, hematuria, recent falls. Ambulates independently. Urinal provided. Son at bedside, bed locked and in lowest position for safety, call bell at bedside- educated to call for assistance.

## 2024-04-19 NOTE — ED ADULT TRIAGE NOTE - HEART RATE (BEATS/MIN)
April 1, 2024     Patient: Bailee Perry  YOB: 2011  Date of Visit: 4/1/2024      To Whom It May Concern:    Bailee Perry is under my professional care. Bailee was seen in my office on 4/1/2024. Bailee may return to school on 4/2/2024 .  Please excuse from school 3/26-3/28/2024.    If you have any questions or concerns, please don't hesitate to call.         Sincerely,          Jairon Jacobson MD        CC: No Recipients   117

## 2024-04-19 NOTE — ED PROVIDER NOTE - PATIENT PORTAL LINK FT
You can access the FollowMyHealth Patient Portal offered by [facility] by registering at the following website: [email or web address]. By joining [employer]’s FollowMyHealth portal, you will also be able to view your health information using other applications (apps) compatible with our system.

## 2024-04-19 NOTE — ED ADULT TRIAGE NOTE - CHIEF COMPLAINT QUOTE
pt axo3. sent in by Dr. Rocha for possible pyelonephritis. c/o frequency, urgency, and dysuria since yesterday w/ BL lower back pain. hx UTI. denies n/v/d.

## 2024-04-20 LAB
E COLI DNA BLD POS QL NAA+NON-PROBE: SIGNIFICANT CHANGE UP
GRAM STN FLD: ABNORMAL
METHOD TYPE: SIGNIFICANT CHANGE UP
SPECIMEN SOURCE: SIGNIFICANT CHANGE UP

## 2024-04-21 ENCOUNTER — INPATIENT (INPATIENT)
Facility: HOSPITAL | Age: 64
LOS: 1 days | Discharge: HOME | DRG: 872 | End: 2024-04-23
Attending: INTERNAL MEDICINE | Admitting: HOSPITALIST
Payer: SELF-PAY

## 2024-04-21 VITALS
TEMPERATURE: 100 F | RESPIRATION RATE: 17 BRPM | OXYGEN SATURATION: 98 % | HEART RATE: 84 BPM | SYSTOLIC BLOOD PRESSURE: 132 MMHG | DIASTOLIC BLOOD PRESSURE: 79 MMHG | WEIGHT: 263.01 LBS | HEIGHT: 73 IN

## 2024-04-21 DIAGNOSIS — R78.81 BACTEREMIA: ICD-10-CM

## 2024-04-21 LAB
ALBUMIN SERPL ELPH-MCNC: 3 G/DL — LOW (ref 3.3–5)
ALP SERPL-CCNC: 66 U/L — SIGNIFICANT CHANGE UP (ref 40–120)
ALT FLD-CCNC: 38 U/L — SIGNIFICANT CHANGE UP (ref 10–45)
ANION GAP SERPL CALC-SCNC: 9 MMOL/L — SIGNIFICANT CHANGE UP (ref 5–17)
APTT BLD: 27.8 SEC — SIGNIFICANT CHANGE UP (ref 24.5–35.6)
AST SERPL-CCNC: 29 U/L — SIGNIFICANT CHANGE UP (ref 10–40)
BASOPHILS # BLD AUTO: 0.03 K/UL — SIGNIFICANT CHANGE UP (ref 0–0.2)
BASOPHILS NFR BLD AUTO: 0.4 % — SIGNIFICANT CHANGE UP (ref 0–2)
BILIRUB SERPL-MCNC: 1.4 MG/DL — HIGH (ref 0.2–1.2)
BUN SERPL-MCNC: 17 MG/DL — SIGNIFICANT CHANGE UP (ref 7–23)
CALCIUM SERPL-MCNC: 8.4 MG/DL — SIGNIFICANT CHANGE UP (ref 8.4–10.5)
CHLORIDE SERPL-SCNC: 100 MMOL/L — SIGNIFICANT CHANGE UP (ref 96–108)
CO2 SERPL-SCNC: 27 MMOL/L — SIGNIFICANT CHANGE UP (ref 22–31)
CREAT SERPL-MCNC: 1.04 MG/DL — SIGNIFICANT CHANGE UP (ref 0.5–1.3)
EGFR: 81 ML/MIN/1.73M2 — SIGNIFICANT CHANGE UP
EOSINOPHIL # BLD AUTO: 0.02 K/UL — SIGNIFICANT CHANGE UP (ref 0–0.5)
EOSINOPHIL NFR BLD AUTO: 0.3 % — SIGNIFICANT CHANGE UP (ref 0–6)
GLUCOSE BLDC GLUCOMTR-MCNC: 154 MG/DL — HIGH (ref 70–99)
GLUCOSE BLDC GLUCOMTR-MCNC: 191 MG/DL — HIGH (ref 70–99)
GLUCOSE SERPL-MCNC: 188 MG/DL — HIGH (ref 70–99)
HCT VFR BLD CALC: 43.3 % — SIGNIFICANT CHANGE UP (ref 39–50)
HGB BLD-MCNC: 15.2 G/DL — SIGNIFICANT CHANGE UP (ref 13–17)
IMM GRANULOCYTES NFR BLD AUTO: 0.6 % — SIGNIFICANT CHANGE UP (ref 0–0.9)
INR BLD: 1.13 RATIO — SIGNIFICANT CHANGE UP (ref 0.85–1.18)
LACTATE SERPL-SCNC: 0.8 MMOL/L — SIGNIFICANT CHANGE UP (ref 0.7–2)
LYMPHOCYTES # BLD AUTO: 0.67 K/UL — LOW (ref 1–3.3)
LYMPHOCYTES # BLD AUTO: 9.4 % — LOW (ref 13–44)
MCHC RBC-ENTMCNC: 29.6 PG — SIGNIFICANT CHANGE UP (ref 27–34)
MCHC RBC-ENTMCNC: 35.1 GM/DL — SIGNIFICANT CHANGE UP (ref 32–36)
MCV RBC AUTO: 84.4 FL — SIGNIFICANT CHANGE UP (ref 80–100)
MONOCYTES # BLD AUTO: 0.72 K/UL — SIGNIFICANT CHANGE UP (ref 0–0.9)
MONOCYTES NFR BLD AUTO: 10.1 % — SIGNIFICANT CHANGE UP (ref 2–14)
NEUTROPHILS # BLD AUTO: 5.67 K/UL — SIGNIFICANT CHANGE UP (ref 1.8–7.4)
NEUTROPHILS NFR BLD AUTO: 79.2 % — HIGH (ref 43–77)
NRBC # BLD: 0 /100 WBCS — SIGNIFICANT CHANGE UP (ref 0–0)
PLATELET # BLD AUTO: 156 K/UL — SIGNIFICANT CHANGE UP (ref 150–400)
POTASSIUM SERPL-MCNC: 3.5 MMOL/L — SIGNIFICANT CHANGE UP (ref 3.5–5.3)
POTASSIUM SERPL-SCNC: 3.5 MMOL/L — SIGNIFICANT CHANGE UP (ref 3.5–5.3)
PROT SERPL-MCNC: 7 G/DL — SIGNIFICANT CHANGE UP (ref 6–8.3)
PROTHROM AB SERPL-ACNC: 13.2 SEC — HIGH (ref 9.5–13)
RBC # BLD: 5.13 M/UL — SIGNIFICANT CHANGE UP (ref 4.2–5.8)
RBC # FLD: 12.7 % — SIGNIFICANT CHANGE UP (ref 10.3–14.5)
SODIUM SERPL-SCNC: 136 MMOL/L — SIGNIFICANT CHANGE UP (ref 135–145)
WBC # BLD: 7.15 K/UL — SIGNIFICANT CHANGE UP (ref 3.8–10.5)
WBC # FLD AUTO: 7.15 K/UL — SIGNIFICANT CHANGE UP (ref 3.8–10.5)

## 2024-04-21 PROCEDURE — 99285 EMERGENCY DEPT VISIT HI MDM: CPT

## 2024-04-21 PROCEDURE — 99223 1ST HOSP IP/OBS HIGH 75: CPT

## 2024-04-21 PROCEDURE — 93010 ELECTROCARDIOGRAM REPORT: CPT

## 2024-04-21 RX ORDER — SODIUM CHLORIDE 9 MG/ML
1000 INJECTION, SOLUTION INTRAVENOUS
Refills: 0 | Status: DISCONTINUED | OUTPATIENT
Start: 2024-04-21 | End: 2024-04-23

## 2024-04-21 RX ORDER — METFORMIN HYDROCHLORIDE 850 MG/1
1 TABLET ORAL
Refills: 0 | DISCHARGE

## 2024-04-21 RX ORDER — DEXTROSE 10 % IN WATER 10 %
125 INTRAVENOUS SOLUTION INTRAVENOUS ONCE
Refills: 0 | Status: DISCONTINUED | OUTPATIENT
Start: 2024-04-21 | End: 2024-04-23

## 2024-04-21 RX ORDER — GLUCAGON INJECTION, SOLUTION 0.5 MG/.1ML
1 INJECTION, SOLUTION SUBCUTANEOUS ONCE
Refills: 0 | Status: DISCONTINUED | OUTPATIENT
Start: 2024-04-21 | End: 2024-04-23

## 2024-04-21 RX ORDER — DEXTROSE 50 % IN WATER 50 %
15 SYRINGE (ML) INTRAVENOUS ONCE
Refills: 0 | Status: DISCONTINUED | OUTPATIENT
Start: 2024-04-21 | End: 2024-04-23

## 2024-04-21 RX ORDER — ONDANSETRON 8 MG/1
4 TABLET, FILM COATED ORAL EVERY 8 HOURS
Refills: 0 | Status: DISCONTINUED | OUTPATIENT
Start: 2024-04-21 | End: 2024-04-23

## 2024-04-21 RX ORDER — INSULIN LISPRO 100/ML
VIAL (ML) SUBCUTANEOUS AT BEDTIME
Refills: 0 | Status: DISCONTINUED | OUTPATIENT
Start: 2024-04-21 | End: 2024-04-23

## 2024-04-21 RX ORDER — PIPERACILLIN AND TAZOBACTAM 4; .5 G/20ML; G/20ML
3.38 INJECTION, POWDER, LYOPHILIZED, FOR SOLUTION INTRAVENOUS ONCE
Refills: 0 | Status: COMPLETED | OUTPATIENT
Start: 2024-04-21 | End: 2024-04-21

## 2024-04-21 RX ORDER — DEXTROSE 50 % IN WATER 50 %
12.5 SYRINGE (ML) INTRAVENOUS ONCE
Refills: 0 | Status: DISCONTINUED | OUTPATIENT
Start: 2024-04-21 | End: 2024-04-23

## 2024-04-21 RX ORDER — INSULIN GLARGINE 100 [IU]/ML
52 INJECTION, SOLUTION SUBCUTANEOUS
Refills: 0 | DISCHARGE

## 2024-04-21 RX ORDER — INSULIN LISPRO 100/ML
VIAL (ML) SUBCUTANEOUS
Refills: 0 | Status: DISCONTINUED | OUTPATIENT
Start: 2024-04-21 | End: 2024-04-23

## 2024-04-21 RX ORDER — SODIUM CHLORIDE 9 MG/ML
2500 INJECTION INTRAMUSCULAR; INTRAVENOUS; SUBCUTANEOUS ONCE
Refills: 0 | Status: COMPLETED | OUTPATIENT
Start: 2024-04-21 | End: 2024-04-21

## 2024-04-21 RX ORDER — ACETAMINOPHEN 500 MG
650 TABLET ORAL EVERY 6 HOURS
Refills: 0 | Status: DISCONTINUED | OUTPATIENT
Start: 2024-04-21 | End: 2024-04-23

## 2024-04-21 RX ORDER — TIRZEPATIDE 15 MG/.5ML
5 INJECTION, SOLUTION SUBCUTANEOUS
Refills: 0 | DISCHARGE

## 2024-04-21 RX ORDER — DEXTROSE 50 % IN WATER 50 %
25 SYRINGE (ML) INTRAVENOUS ONCE
Refills: 0 | Status: DISCONTINUED | OUTPATIENT
Start: 2024-04-21 | End: 2024-04-23

## 2024-04-21 RX ORDER — PIPERACILLIN AND TAZOBACTAM 4; .5 G/20ML; G/20ML
3.38 INJECTION, POWDER, LYOPHILIZED, FOR SOLUTION INTRAVENOUS EVERY 8 HOURS
Refills: 0 | Status: DISCONTINUED | OUTPATIENT
Start: 2024-04-21 | End: 2024-04-22

## 2024-04-21 RX ORDER — INFLUENZA VIRUS VACCINE 15; 15; 15; 15 UG/.5ML; UG/.5ML; UG/.5ML; UG/.5ML
0.5 SUSPENSION INTRAMUSCULAR ONCE
Refills: 0 | Status: DISCONTINUED | OUTPATIENT
Start: 2024-04-21 | End: 2024-04-23

## 2024-04-21 RX ADMIN — Medication 650 MILLIGRAM(S): at 14:02

## 2024-04-21 RX ADMIN — PIPERACILLIN AND TAZOBACTAM 25 GRAM(S): 4; .5 INJECTION, POWDER, LYOPHILIZED, FOR SOLUTION INTRAVENOUS at 15:08

## 2024-04-21 RX ADMIN — PIPERACILLIN AND TAZOBACTAM 25 GRAM(S): 4; .5 INJECTION, POWDER, LYOPHILIZED, FOR SOLUTION INTRAVENOUS at 22:02

## 2024-04-21 RX ADMIN — Medication 650 MILLIGRAM(S): at 19:29

## 2024-04-21 RX ADMIN — PIPERACILLIN AND TAZOBACTAM 200 GRAM(S): 4; .5 INJECTION, POWDER, LYOPHILIZED, FOR SOLUTION INTRAVENOUS at 11:10

## 2024-04-21 RX ADMIN — Medication 2: at 17:06

## 2024-04-21 RX ADMIN — PIPERACILLIN AND TAZOBACTAM 3.38 GRAM(S): 4; .5 INJECTION, POWDER, LYOPHILIZED, FOR SOLUTION INTRAVENOUS at 11:40

## 2024-04-21 RX ADMIN — Medication 0: at 22:04

## 2024-04-21 RX ADMIN — SODIUM CHLORIDE 2500 MILLILITER(S): 9 INJECTION INTRAMUSCULAR; INTRAVENOUS; SUBCUTANEOUS at 12:10

## 2024-04-21 RX ADMIN — SODIUM CHLORIDE 2500 MILLILITER(S): 9 INJECTION INTRAMUSCULAR; INTRAVENOUS; SUBCUTANEOUS at 11:10

## 2024-04-21 RX ADMIN — Medication 650 MILLIGRAM(S): at 13:18

## 2024-04-21 NOTE — PATIENT PROFILE ADULT - FUNCTIONAL ASSESSMENT - BASIC MOBILITY 4.
How Severe Is/Are Your Symptoms?: mild Is This A New Presentation, Or A Follow-Up?: Vein Evaluation 4 = No assist / stand by assistance

## 2024-04-21 NOTE — H&P ADULT - ASSESSMENT
63 y/o M with PMH of DM2 who was recently in ED for fevers and was diagnosed with pyelonephritis returns to ED after being called back for positive blood cultures admitted for ecoli bacteremia    #Ecoli bacteremia  #Pyelonephritis  -Recent ED visit for pyelonephritis and discharged with PO vantin  -now with ecoli bacteremia due to pyelonephritis  -c/w zosyn for now until sensitivities are back  -clinically improving given WBC now normal  -CT abd on 4/19 without any stones/obstruction  -repeat blood cultures in lab    #DM2  -home regimen includes metformin, toujeo, mounjaro   -patient reports no appetite for last 4 days, has not taken insulin at home, FS 170s  -will start on moderate sliding scale and monitor FS  -hold home insulin for now    #DVT ppx  low risk

## 2024-04-21 NOTE — ED ADULT NURSE NOTE - NSFALLUNIVINTERV_ED_ALL_ED
Bed/Stretcher in lowest position, wheels locked, appropriate side rails in place/Call bell, personal items and telephone in reach/Instruct patient to call for assistance before getting out of bed/chair/stretcher/Non-slip footwear applied when patient is off stretcher/[place] to call system/Physically safe environment - no spills, clutter or unnecessary equipment/Purposeful proactive rounding/Room/bathroom lighting operational, light cord in reach

## 2024-04-21 NOTE — ED ADULT NURSE NOTE - NSICDXPASTSURGICALHX_GEN_ALL_CORE_FT
right big toe pain since Monday of this week
PAST SURGICAL HISTORY:  No significant past surgical history

## 2024-04-21 NOTE — ED PROVIDER NOTE - CLINICAL SUMMARY MEDICAL DECISION MAKING FREE TEXT BOX
64-year-old male presents to the emergency department as a callback for gram-negative jenn bacteremia.  Patient recently here and diagnosed with pyelonephritis due to body aches back pain Tmax fever 103 and positive UTI.  Patient states that he felt well yesterday however today does not feel 100%.  Low-grade temperatures.  Mild nausea. Malaise.  Headache.  And a mild ache in the lower back.  Patient returns for admission.  Exam as stated. Plan for admission.   D/W Dr Marshall.

## 2024-04-21 NOTE — PATIENT PROFILE ADULT - FUNCTIONAL ASSESSMENT - DAILY ACTIVITY 1.
4 = No assist / stand by assistance 

## 2024-04-21 NOTE — PATIENT PROFILE ADULT - FALL HARM RISK - HARM RISK INTERVENTIONS

## 2024-04-21 NOTE — ED PROVIDER NOTE - OBJECTIVE STATEMENT
64-year-old male presents to the emergency department as a callback for gram-negative jenn bacteremia.  Patient recently here and diagnosed with pyelonephritis due to body aches back pain Tmax fever 103 and positive UTI.  Patient states that he felt well yesterday however today does not feel 100%.  Low-grade temperatures.  Mild nausea. Malaise.  Headache.  And a mild ache in the lower back.  Patient returns for admission.

## 2024-04-21 NOTE — ED ADULT NURSE NOTE - OBJECTIVE STATEMENT
Pt a&ox4 ambulatory to ED. He was called back to ED due to positive blood cultures and told to be admitted for IV abx. He was here 2 days ago and was dx with kidney infection. Pt states he feels tired.

## 2024-04-21 NOTE — ED ADULT NURSE NOTE - CAS DISCH BELONGINGS RETURNED
Patient is a 58y old  Female who is awake and uncomfortable on vent    T(F): 99.2 (02-12-18 @ 09:03), Max: 99.7 (02-12-18 @ 01:00)  HR: 73 (02-12-18 @ 11:00)  BP: 101/54 (02-12-18 @ 11:00)  RR: 16 (02-12-18 @ 11:01)  SpO2: 99% (02-12-18 @ 11:00) (91% - 100%)    PHYSICAL EXAM:  GENERAL: NAD,   HEAD:  Atraumatic, Normocephalic  EYES: EOMI, PERRLA, conjunctiva and sclera clear  ENMT: trach in place clean, dry  NECK: Supple, No JVD, Normal thyroid  NERVOUS SYSTEM:  no focal deficits  CHEST/LUNG: b/l rhonchi  HEART: Regular rate and rhythm; No murmurs, rubs, or gallops  ABDOMEN: mild tenderness around peg site insertion  EXTREMITIES:  2+ Peripheral Pulses, No clubbing, cyanosis, or edema  LYMPH: No lymphadenopathy noted  SKIN: No rashes or lesions    LABS  02-12    142  |  109  |  13  ----------------------------<  122<H>  3.9   |  27  |  0.6<L>    Ca    8.5      12 Feb 2018 04:30  Mg     1.9     02-12    TPro  5.6<L>  /  Alb  2.1<L>  /  TBili  0.5  /  DBili  x   /  AST  35  /  ALT  28  /  AlkPhos  64  02-12                          10.2   8.44  )-----------( 263      ( 12 Feb 2018 04:30 )             31.6       Mode: Auto Mode: PRVC/ Volume Support  RR (machine): 14  TV (machine): 450  FiO2: 30  PEEP: 3  ITime: 1  MAP: 9  PIP: 12    CARDIAC ENZYMES            RADIOLOGY  < from: Xray Chest 1 View- PORTABLE-Routine (02.12.18 @ 06:29) >  Impression:      Bilateral opacities, stable. Support devices in place.      < end of copied text >    MEDICATIONS  (STANDING):  acetaminophen   Tablet. 650 milliGRAM(s) Oral every 6 hours  chlorhexidine 0.12% Liquid 15 milliLiter(s) Swish and Spit two times a day  dexmedetomidine Infusion 0.5 MICROgram(s)/kG/Hr (8 mL/Hr) IV Continuous <Continuous>  docusate sodium 100 milliGRAM(s) Oral three times a day  heparin  Injectable (Preservative-Free) 5000 Unit(s) SubCutaneous every 8 hours  influenza   Vaccine 0.5 milliLiter(s) IntraMuscular once  levothyroxine 75 MICROGram(s) Oral daily  midodrine 10 milliGRAM(s) Oral every 8 hours  norepinephrine Infusion 0.208 MICROgram(s)/kG/Min (25 mL/Hr) IV Continuous <Continuous>  nystatin Powder 1 Application(s) Topical every 12 hours  nystatin/triamcinolone Ointment 1 Application(s) Topical every 12 hours  pantoprazole  Injectable 40 milliGRAM(s) IV Push every 24 hours  senna 1 Tablet(s) Oral every 12 hours    MEDICATIONS  (PRN):  acetaminophen  Suppository 650 milliGRAM(s) Rectal every 4 hours PRN For Temp greater than 38 C (100.4 F)  LORazepam   Injectable 1 milliGRAM(s) IV Push every 6 hours PRN Anxiety sent with pt

## 2024-04-21 NOTE — H&P ADULT - TIME BILLING
Pt left ambulatory with discharge instructions accompanied by her .
Time spent includes direct patient care  (interview and examination of patient), discussion with other providers, support staff and/or patient's family members, review of medical records, ordering diagnostic tests and analyzing results, and documentation.

## 2024-04-22 LAB
-  AMPICILLIN/SULBACTAM: SIGNIFICANT CHANGE UP
-  AMPICILLIN: SIGNIFICANT CHANGE UP
-  AZTREONAM: SIGNIFICANT CHANGE UP
-  CEFAZOLIN: SIGNIFICANT CHANGE UP
-  CEFEPIME: SIGNIFICANT CHANGE UP
-  CEFOXITIN: SIGNIFICANT CHANGE UP
-  CEFTRIAXONE: SIGNIFICANT CHANGE UP
-  CIPROFLOXACIN: SIGNIFICANT CHANGE UP
-  ERTAPENEM: SIGNIFICANT CHANGE UP
-  GENTAMICIN: SIGNIFICANT CHANGE UP
-  IMIPENEM: SIGNIFICANT CHANGE UP
-  LEVOFLOXACIN: SIGNIFICANT CHANGE UP
-  MEROPENEM: SIGNIFICANT CHANGE UP
-  PIPERACILLIN/TAZOBACTAM: SIGNIFICANT CHANGE UP
-  TOBRAMYCIN: SIGNIFICANT CHANGE UP
-  TRIMETHOPRIM/SULFAMETHOXAZOLE: SIGNIFICANT CHANGE UP
A1C WITH ESTIMATED AVERAGE GLUCOSE RESULT: 8.3 % — HIGH (ref 4–5.6)
ANION GAP SERPL CALC-SCNC: 8 MMOL/L — SIGNIFICANT CHANGE UP (ref 5–17)
BUN SERPL-MCNC: 13 MG/DL — SIGNIFICANT CHANGE UP (ref 7–23)
CALCIUM SERPL-MCNC: 7.9 MG/DL — LOW (ref 8.4–10.5)
CHLORIDE SERPL-SCNC: 103 MMOL/L — SIGNIFICANT CHANGE UP (ref 96–108)
CO2 SERPL-SCNC: 26 MMOL/L — SIGNIFICANT CHANGE UP (ref 22–31)
CREAT SERPL-MCNC: 0.92 MG/DL — SIGNIFICANT CHANGE UP (ref 0.5–1.3)
CULTURE RESULTS: ABNORMAL
EGFR: 94 ML/MIN/1.73M2 — SIGNIFICANT CHANGE UP
ESTIMATED AVERAGE GLUCOSE: 192 MG/DL — HIGH (ref 68–114)
GLUCOSE BLDC GLUCOMTR-MCNC: 156 MG/DL — HIGH (ref 70–99)
GLUCOSE BLDC GLUCOMTR-MCNC: 165 MG/DL — HIGH (ref 70–99)
GLUCOSE BLDC GLUCOMTR-MCNC: 174 MG/DL — HIGH (ref 70–99)
GLUCOSE BLDC GLUCOMTR-MCNC: 175 MG/DL — HIGH (ref 70–99)
GLUCOSE SERPL-MCNC: 178 MG/DL — HIGH (ref 70–99)
HCT VFR BLD CALC: 41.4 % — SIGNIFICANT CHANGE UP (ref 39–50)
HGB BLD-MCNC: 14.6 G/DL — SIGNIFICANT CHANGE UP (ref 13–17)
MCHC RBC-ENTMCNC: 29.5 PG — SIGNIFICANT CHANGE UP (ref 27–34)
MCHC RBC-ENTMCNC: 35.3 GM/DL — SIGNIFICANT CHANGE UP (ref 32–36)
MCV RBC AUTO: 83.6 FL — SIGNIFICANT CHANGE UP (ref 80–100)
METHOD TYPE: SIGNIFICANT CHANGE UP
NRBC # BLD: 0 /100 WBCS — SIGNIFICANT CHANGE UP (ref 0–0)
ORGANISM # SPEC MICROSCOPIC CNT: ABNORMAL
ORGANISM # SPEC MICROSCOPIC CNT: ABNORMAL
ORGANISM # SPEC MICROSCOPIC CNT: SIGNIFICANT CHANGE UP
PLATELET # BLD AUTO: 165 K/UL — SIGNIFICANT CHANGE UP (ref 150–400)
POTASSIUM SERPL-MCNC: 3.7 MMOL/L — SIGNIFICANT CHANGE UP (ref 3.5–5.3)
POTASSIUM SERPL-SCNC: 3.7 MMOL/L — SIGNIFICANT CHANGE UP (ref 3.5–5.3)
RBC # BLD: 4.95 M/UL — SIGNIFICANT CHANGE UP (ref 4.2–5.8)
RBC # FLD: 12.7 % — SIGNIFICANT CHANGE UP (ref 10.3–14.5)
SODIUM SERPL-SCNC: 137 MMOL/L — SIGNIFICANT CHANGE UP (ref 135–145)
SPECIMEN SOURCE: SIGNIFICANT CHANGE UP
WBC # BLD: 5.26 K/UL — SIGNIFICANT CHANGE UP (ref 3.8–10.5)
WBC # FLD AUTO: 5.26 K/UL — SIGNIFICANT CHANGE UP (ref 3.8–10.5)

## 2024-04-22 PROCEDURE — 99233 SBSQ HOSP IP/OBS HIGH 50: CPT

## 2024-04-22 RX ORDER — IBUPROFEN 200 MG
600 TABLET ORAL ONCE
Refills: 0 | Status: COMPLETED | OUTPATIENT
Start: 2024-04-22 | End: 2024-04-22

## 2024-04-22 RX ORDER — CEFTRIAXONE 500 MG/1
2000 INJECTION, POWDER, FOR SOLUTION INTRAMUSCULAR; INTRAVENOUS EVERY 24 HOURS
Refills: 0 | Status: DISCONTINUED | OUTPATIENT
Start: 2024-04-22 | End: 2024-04-23

## 2024-04-22 RX ADMIN — CEFTRIAXONE 100 MILLIGRAM(S): 500 INJECTION, POWDER, FOR SOLUTION INTRAMUSCULAR; INTRAVENOUS at 14:33

## 2024-04-22 RX ADMIN — Medication 650 MILLIGRAM(S): at 09:52

## 2024-04-22 RX ADMIN — Medication 2: at 17:06

## 2024-04-22 RX ADMIN — Medication 650 MILLIGRAM(S): at 00:00

## 2024-04-22 RX ADMIN — Medication 650 MILLIGRAM(S): at 22:07

## 2024-04-22 RX ADMIN — Medication 650 MILLIGRAM(S): at 16:58

## 2024-04-22 RX ADMIN — Medication 2: at 08:01

## 2024-04-22 RX ADMIN — Medication 600 MILLIGRAM(S): at 18:02

## 2024-04-22 RX ADMIN — PIPERACILLIN AND TAZOBACTAM 25 GRAM(S): 4; .5 INJECTION, POWDER, LYOPHILIZED, FOR SOLUTION INTRAVENOUS at 06:28

## 2024-04-22 RX ADMIN — Medication 2: at 11:46

## 2024-04-22 RX ADMIN — Medication 650 MILLIGRAM(S): at 22:50

## 2024-04-22 RX ADMIN — Medication 650 MILLIGRAM(S): at 08:45

## 2024-04-22 NOTE — CONSULT NOTE ADULT - ASSESSMENT
64y male with PMH significant for asthma, DM, obesity & prior hx of pyelonephritis, had developed burning in micturition on 4/18/24. This was followed by frequency, urgency, low back pain & poor appetite. Presented to the ER on 4/19/24. Found to have high fevers, with tachycardia & leukocytosis of 18K. UA was cloudy with 40 WBCs, mod LE & nit. He was dx with pyelonephritis & sent home on PO Vantin.  Readmitted on 4/21/24 for E coli bacteremia, by then sepsis had resolved.  Feeling tired but all other symptoms have improved.    PLAN:  Continue IV Ceftriaxone.  Follow sensitivity data on E coli.  Will follow, thank you.

## 2024-04-22 NOTE — PROGRESS NOTE ADULT - ASSESSMENT
65 y/o M with PMH of DM2 who was recently in ED for fevers and was diagnosed with pyelonephritis returns to ED after being called back for positive blood cultures admitted for ecoli bacteremia    #Ecoli bacteremia  #Pyelonephritis  - Recent ED visit for pyelonephritis and discharged with PO vantin  - Now with ecoli bacteremia due to pyelonephritis noted on 4/19 blood cultures   - c/w zosyn for now until sensitivities are back  - Clinically improving given WBC now normal  - CT abd on 4/19 without any stones/obstruction  - Repeat blood cultures in lab  - ID consult    #DM2  - Home regimen includes metformin, toujeo, mounjaro   - Patient reports no appetite for last 4 days, has not taken insulin at home, FS 170s  - Started on moderate sliding scale and monitor FS  - Hold home insulin for now    #DVT ppx  - Low risk, ambulate      65 y/o M with PMH of DM2 who was recently in ED for fevers and was diagnosed with pyelonephritis returns to ED after being called back for positive blood cultures admitted for ecoli bacteremia    #Ecoli bacteremia  #Pyelonephritis  - Recent ED visit for pyelonephritis and discharged with PO vantin  - Now with ecoli bacteremia due to pyelonephritis noted on 4/19 blood cultures   - c/w zosyn for now until sensitivities are back  - Clinically improving given WBC now normal  - CT abd on 4/19 without any stones/obstruction  - Repeat blood cultures in lab  - ID consult pending     #DM2  - Home regimen includes metformin, toujeo mounjaro   - Patient reports no appetite for last 4 days, has not taken insulin at home, FS 170s  - Started on moderate sliding scale and monitor FS  - Hold home insulin for now    #DVT ppx  - Low risk, ambulate     Dispo: Pending above

## 2024-04-22 NOTE — PHARMACOTHERAPY INTERVENTION NOTE - INTERVENTION CATEGORIES
Detail Level: Zone
Patient Education
Quality 130: Documentation Of Current Medications In The Medical Record: Current Medications Documented

## 2024-04-22 NOTE — CONSULT NOTE ADULT - SUBJECTIVE AND OBJECTIVE BOX
HPI:   Patient is a 64y male with PMH significant for asthma, DM, obesity & prior hx of pyelonephritis, had developed burning in micturition on 4/18/24. This was followed by frequency, urgency, low back pain & poor appetite. Presented to the ER on 4/19/24. Found to have high fevers, with tachycardia & leukocytosis of 18K. UA was cloudy with 40 WBCs, mod LE & nit. He was dx with pyelonephritis & sent home on PO Vantin.    He was called back for admission on 4/21/22 for 1/4 bottles with E coli. Started on Zosyn & tailored to CTX. By now fevers & leukocytosis has already resolved. Appetite is returning today.     REVIEW OF SYSTEMS:  All other review of systems negative (Comprehensive ROS) as above     PAST MEDICAL & SURGICAL HISTORY:  Asthma  Diabetes      No significant past surgical history      Allergies  No Known Allergies      Antimicrobials Day #  : 2  cefTRIAXone   IVPB 2000 milliGRAM(s) IV Intermittent every 24 hours    Other Medications:  acetaminophen     Tablet .. 650 milliGRAM(s) Oral every 6 hours PRN  dextrose 10% Bolus 125 milliLiter(s) IV Bolus once  dextrose 5%. 1000 milliLiter(s) IV Continuous <Continuous>  dextrose 5%. 1000 milliLiter(s) IV Continuous <Continuous>  dextrose 50% Injectable 12.5 Gram(s) IV Push once  dextrose 50% Injectable 25 Gram(s) IV Push once  dextrose Oral Gel 15 Gram(s) Oral once PRN  glucagon  Injectable 1 milliGRAM(s) IntraMuscular once  influenza   Vaccine 0.5 milliLiter(s) IntraMuscular once  insulin lispro (ADMELOG) corrective regimen sliding scale   SubCutaneous at bedtime  insulin lispro (ADMELOG) corrective regimen sliding scale   SubCutaneous three times a day before meals  ondansetron Injectable 4 milliGRAM(s) IV Push every 8 hours PRN      FAMILY HISTORY:      SOCIAL HISTORY:  Smoking: No    ETOH: No    Drug Use: No    T(F): 98.8 (04-22-24 @ 11:00), Max: 99.4 (04-21-24 @ 17:30)  HR: 76 (04-22-24 @ 11:00)  BP: 144/84 (04-22-24 @ 11:00)  RR: 18 (04-22-24 @ 11:00)  SpO2: 96% (04-22-24 @ 11:00)  Wt(kg): --    PHYSICAL EXAM:  General: alert, no acute distress  Eyes:  anicteric, no conjunctival injection, no discharge  Neck: supple  Lungs: clear to auscultation  Heart: regular rate and rhythm; no murmurs.  Abdomen: soft, nondistended, nontender, without mass or organomegaly. No CVA tenderness.   Skin: no lesions  Extremities: no clubbing, cyanosis, or edema  Neurologic: alert, oriented, moves all extremities    LAB RESULTS:                        14.6   5.26  )-----------( 165      ( 22 Apr 2024 07:10 )             41.4     04-22    137  |  103  |  13  ----------------------------<  178<H>  3.7   |  26  |  0.92    Ca    7.9<L>      22 Apr 2024 07:10    TPro  7.0  /  Alb  3.0<L>  /  TBili  1.4<H>  /  DBili  x   /  AST  29  /  ALT  38  /  AlkPhos  66  04-21    LIVER FUNCTIONS - ( 21 Apr 2024 11:05 )  Alb: 3.0 g/dL / Pro: 7.0 g/dL / ALK PHOS: 66 U/L / ALT: 38 U/L / AST: 29 U/L / GGT: x           Urinalysis Basic - ( 22 Apr 2024 07:10 )    Color: x / Appearance: x / SG: x / pH: x  Gluc: 178 mg/dL / Ketone: x  / Bili: x / Urobili: x   Blood: x / Protein: x / Nitrite: x   Leuk Esterase: x / RBC: x / WBC x   Sq Epi: x / Non Sq Epi: x / Bacteria: x        MICROBIOLOGY:  RECENT CULTURES:  04-19 @ 19:25 Clean Catch Clean Catch (Midstream)     >100,000 CFU/ml Escherichia coli      04-19 @ 18:40 .Blood Blood-Peripheral Blood Culture PCR  Escherichia coli    Growth in anaerobic bottle: Escherichia coli  Direct identification is available within approximately 3-5  hours either by Blood Panel Multiplexed PCR or Direct  MALDI-TOF. Details: [email or web address]    Growth in anaerobic bottle: Gram Negative Rods    04-19 @ 18:20 .Blood Blood-Peripheral     No growth at 48 Hours      RADIOLOGY REVIEWED:  < from: CT Renal Stone Hunt (04.19.24 @ 20:49) >  IMPRESSION:  No hydronephrosis or renal calculus.  Small amount of nonspecific edema in the fat adjacent to the left pelvic   sidewall.    < end of copied text >

## 2024-04-23 ENCOUNTER — TRANSCRIPTION ENCOUNTER (OUTPATIENT)
Age: 64
End: 2024-04-23

## 2024-04-23 VITALS
RESPIRATION RATE: 16 BRPM | SYSTOLIC BLOOD PRESSURE: 135 MMHG | TEMPERATURE: 97 F | DIASTOLIC BLOOD PRESSURE: 84 MMHG | OXYGEN SATURATION: 97 % | HEART RATE: 75 BPM

## 2024-04-23 LAB
-  AMOXICILLIN/CLAVULANIC ACID: SIGNIFICANT CHANGE UP
-  AMPICILLIN/SULBACTAM: SIGNIFICANT CHANGE UP
-  AMPICILLIN: SIGNIFICANT CHANGE UP
-  AZTREONAM: SIGNIFICANT CHANGE UP
-  CEFAZOLIN: SIGNIFICANT CHANGE UP
-  CEFEPIME: SIGNIFICANT CHANGE UP
-  CEFOXITIN: SIGNIFICANT CHANGE UP
-  CEFTRIAXONE: SIGNIFICANT CHANGE UP
-  CEFUROXIME: SIGNIFICANT CHANGE UP
-  CIPROFLOXACIN: SIGNIFICANT CHANGE UP
-  ERTAPENEM: SIGNIFICANT CHANGE UP
-  GENTAMICIN: SIGNIFICANT CHANGE UP
-  IMIPENEM: SIGNIFICANT CHANGE UP
-  LEVOFLOXACIN: SIGNIFICANT CHANGE UP
-  MEROPENEM: SIGNIFICANT CHANGE UP
-  NITROFURANTOIN: SIGNIFICANT CHANGE UP
-  PIPERACILLIN/TAZOBACTAM: SIGNIFICANT CHANGE UP
-  TOBRAMYCIN: SIGNIFICANT CHANGE UP
-  TRIMETHOPRIM/SULFAMETHOXAZOLE: SIGNIFICANT CHANGE UP
BACTERIA UR CULT: ABNORMAL
CULTURE RESULTS: ABNORMAL
GLUCOSE BLDC GLUCOMTR-MCNC: 149 MG/DL — HIGH (ref 70–99)
GLUCOSE BLDC GLUCOMTR-MCNC: 155 MG/DL — HIGH (ref 70–99)
METHOD TYPE: SIGNIFICANT CHANGE UP
ORGANISM # SPEC MICROSCOPIC CNT: ABNORMAL
ORGANISM # SPEC MICROSCOPIC CNT: SIGNIFICANT CHANGE UP
SPECIMEN SOURCE: SIGNIFICANT CHANGE UP

## 2024-04-23 PROCEDURE — 93005 ELECTROCARDIOGRAM TRACING: CPT

## 2024-04-23 PROCEDURE — 99285 EMERGENCY DEPT VISIT HI MDM: CPT | Mod: 25

## 2024-04-23 PROCEDURE — 80048 BASIC METABOLIC PNL TOTAL CA: CPT

## 2024-04-23 PROCEDURE — 85730 THROMBOPLASTIN TIME PARTIAL: CPT

## 2024-04-23 PROCEDURE — 83605 ASSAY OF LACTIC ACID: CPT

## 2024-04-23 PROCEDURE — 82962 GLUCOSE BLOOD TEST: CPT

## 2024-04-23 PROCEDURE — 80053 COMPREHEN METABOLIC PANEL: CPT

## 2024-04-23 PROCEDURE — 85027 COMPLETE CBC AUTOMATED: CPT

## 2024-04-23 PROCEDURE — 85025 COMPLETE CBC W/AUTO DIFF WBC: CPT

## 2024-04-23 PROCEDURE — 99239 HOSP IP/OBS DSCHRG MGMT >30: CPT

## 2024-04-23 PROCEDURE — 87040 BLOOD CULTURE FOR BACTERIA: CPT

## 2024-04-23 PROCEDURE — 83036 HEMOGLOBIN GLYCOSYLATED A1C: CPT

## 2024-04-23 PROCEDURE — 36415 COLL VENOUS BLD VENIPUNCTURE: CPT

## 2024-04-23 PROCEDURE — 85610 PROTHROMBIN TIME: CPT

## 2024-04-23 PROCEDURE — 96365 THER/PROPH/DIAG IV INF INIT: CPT

## 2024-04-23 RX ORDER — CEFUROXIME AXETIL 250 MG
1 TABLET ORAL
Qty: 22 | Refills: 0
Start: 2024-04-23 | End: 2024-05-03

## 2024-04-23 RX ADMIN — Medication 2: at 08:40

## 2024-04-23 RX ADMIN — CEFTRIAXONE 100 MILLIGRAM(S): 500 INJECTION, POWDER, FOR SOLUTION INTRAMUSCULAR; INTRAVENOUS at 13:14

## 2024-04-23 NOTE — PROGRESS NOTE ADULT - SUBJECTIVE AND OBJECTIVE BOX
CC: f/u for E Coli bacteremia    Patient reports: he is feeling well, voiding without difficulty    REVIEW OF SYSTEMS:  All other review of systems negative (Comprehensive ROS)    Antimicrobials Day #  :day 3  cefTRIAXone   IVPB 2000 milliGRAM(s) IV Intermittent every 24 hours    Other Medications Reviewed  MEDICATIONS  (STANDING):  cefTRIAXone   IVPB 2000 milliGRAM(s) IV Intermittent every 24 hours  dextrose 10% Bolus 125 milliLiter(s) IV Bolus once  dextrose 5%. 1000 milliLiter(s) (50 mL/Hr) IV Continuous <Continuous>  dextrose 5%. 1000 milliLiter(s) (100 mL/Hr) IV Continuous <Continuous>  dextrose 50% Injectable 12.5 Gram(s) IV Push once  dextrose 50% Injectable 25 Gram(s) IV Push once  glucagon  Injectable 1 milliGRAM(s) IntraMuscular once  influenza   Vaccine 0.5 milliLiter(s) IntraMuscular once  insulin lispro (ADMELOG) corrective regimen sliding scale   SubCutaneous three times a day before meals  insulin lispro (ADMELOG) corrective regimen sliding scale   SubCutaneous at bedtime    T(F): 97.3 (04-23-24 @ 05:00), Max: 98.8 (04-22-24 @ 11:00)  HR: 63 (04-23-24 @ 05:00)  BP: 139/87 (04-23-24 @ 05:00)  RR: 15 (04-23-24 @ 05:00)  SpO2: 97% (04-23-24 @ 05:00)  Wt(kg): --    PHYSICAL EXAM:  General: alert, no acute distress  Eyes:  anicteric, no conjunctival injection, no discharge  Oropharynx: no lesions or injection 	  Neck: supple, without adenopathy  Lungs: clear to auscultation  Heart: regular rate and rhythm; no murmur, rubs or gallops  Abdomen: soft, nondistended, nontender, without mass or organomegaly  Skin: no lesions  Extremities: no clubbing, cyanosis, or edema  Neurologic: alert, oriented, moves all extremities    LAB RESULTS:                        14.6   5.26  )-----------( 165      ( 22 Apr 2024 07:10 )             41.4     04-22    137  |  103  |  13  ----------------------------<  178<H>  3.7   |  26  |  0.92    Ca    7.9<L>      22 Apr 2024 07:10    TPro  7.0  /  Alb  3.0<L>  /  TBili  1.4<H>  /  DBili  x   /  AST  29  /  ALT  38  /  AlkPhos  66  04-21    LIVER FUNCTIONS - ( 21 Apr 2024 11:05 )  Alb: 3.0 g/dL / Pro: 7.0 g/dL / ALK PHOS: 66 U/L / ALT: 38 U/L / AST: 29 U/L / GGT: x                 MICROBIOLOGY:  RECENT CULTURES:  04-21 @ 11:05 .Blood Blood-Peripheral     No growth at 24 hours      04-19 @ 19:25 Clean Catch Clean Catch (Midstream)     >100,000 CFU/ml Escherichia coli      04-19 @ 18:40 .Blood Blood-Peripheral Blood Culture PCR  Escherichia coli    Growth in anaerobic bottle: Escherichia coli  Direct identification is available within approximately 3-5  hours either by Blood Panel Multiplexed PCR or Direct  MALDI-TOF. Details: [email or web address]    Growth in anaerobic bottle: Gram Negative Rods    04-19 @ 18:20 .Blood Blood-Peripheral     No growth at 72 Hours          RADIOLOGY REVIEWED:    < from: CT Renal Stone Hunt (04.19.24 @ 20:49) >  IMPRESSION:  No hydronephrosis or renal calculus.    Small amount of nonspecific edema in the fat adjacent to the left pelvic   sidewall.    < end of copied text >  
Patient is a 64y old  Male who presents with a chief complaint of Called in for positive blood cultures (23 Apr 2024 07:40)      Patient seen and examined at bedside. No overnight events reported. Patient seen and examined sitting in chair     ALLERGIES:  No Known Allergies    MEDICATIONS  (STANDING):  cefTRIAXone   IVPB 2000 milliGRAM(s) IV Intermittent every 24 hours  dextrose 10% Bolus 125 milliLiter(s) IV Bolus once  dextrose 5%. 1000 milliLiter(s) (50 mL/Hr) IV Continuous <Continuous>  dextrose 5%. 1000 milliLiter(s) (100 mL/Hr) IV Continuous <Continuous>  dextrose 50% Injectable 25 Gram(s) IV Push once  dextrose 50% Injectable 12.5 Gram(s) IV Push once  glucagon  Injectable 1 milliGRAM(s) IntraMuscular once  influenza   Vaccine 0.5 milliLiter(s) IntraMuscular once  insulin lispro (ADMELOG) corrective regimen sliding scale   SubCutaneous three times a day before meals  insulin lispro (ADMELOG) corrective regimen sliding scale   SubCutaneous at bedtime    MEDICATIONS  (PRN):  acetaminophen     Tablet .. 650 milliGRAM(s) Oral every 6 hours PRN Temp greater or equal to 38C (100.4F), Mild Pain (1 - 3)  dextrose Oral Gel 15 Gram(s) Oral once PRN Blood Glucose LESS THAN 70 milliGRAM(s)/deciliter  ondansetron Injectable 4 milliGRAM(s) IV Push every 8 hours PRN Nausea and/or Vomiting    Vital Signs Last 24 Hrs  T(F): 97.3 (23 Apr 2024 05:00), Max: 98.8 (22 Apr 2024 11:00)  HR: 63 (23 Apr 2024 05:00) (63 - 76)  BP: 139/87 (23 Apr 2024 05:00) (130/68 - 144/84)  RR: 15 (23 Apr 2024 05:00) (15 - 18)  SpO2: 97% (23 Apr 2024 05:00) (94% - 97%)  I&O's Summary    PHYSICAL EXAM:  General: NAD, A/O x 3  ENT: No gross hearing impairment, Moist mucous membranes, no thrush  Neck: Supple, No JVD  Lungs: Clear to auscultation bilaterally, good air entry, non-labored breathing  Cardio: RRR, S1/S2, No murmur  Abdomen: Soft, Nontender, Nondistended; Bowel sounds present  Extremities: No calf tenderness, No cyanosis, No pitting edema  Psych: Appropriate mood and affect    LABS:                        14.6   5.26  )-----------( 165      ( 22 Apr 2024 07:10 )             41.4     04-22    137  |  103  |  13  ----------------------------<  178  3.7   |  26  |  0.92    Ca    7.9      22 Apr 2024 07:10    TPro  7.0  /  Alb  3.0  /  TBili  1.4  /  DBili  x   /  AST  29  /  ALT  38  /  AlkPhos  66  04-21          PT/INR - ( 21 Apr 2024 11:05 )   PT: 13.2 sec;   INR: 1.13 ratio         PTT - ( 21 Apr 2024 11:05 )  PTT:27.8 sec  Lactate, Blood: 0.8 mmol/L (04-21 @ 11:05)                        POCT Blood Glucose.: 155 mg/dL (23 Apr 2024 08:08)  POCT Blood Glucose.: 156 mg/dL (22 Apr 2024 22:06)  POCT Blood Glucose.: 165 mg/dL (22 Apr 2024 16:54)  POCT Blood Glucose.: 175 mg/dL (22 Apr 2024 11:37)      Urinalysis Basic - ( 22 Apr 2024 07:10 )    Color: x / Appearance: x / SG: x / pH: x  Gluc: 178 mg/dL / Ketone: x  / Bili: x / Urobili: x   Blood: x / Protein: x / Nitrite: x   Leuk Esterase: x / RBC: x / WBC x   Sq Epi: x / Non Sq Epi: x / Bacteria: x        Culture - Blood (collected 21 Apr 2024 11:05)  Source: .Blood Blood-Peripheral  Preliminary Report (22 Apr 2024 16:02):    No growth at 24 hours    Culture - Blood (collected 21 Apr 2024 11:05)  Source: .Blood Blood-Peripheral  Preliminary Report (22 Apr 2024 16:02):    No growth at 24 hours    Culture - Urine (collected 19 Apr 2024 19:25)  Source: Clean Catch Clean Catch (Midstream)  Preliminary Report (21 Apr 2024 09:59):    >100,000 CFU/ml Escherichia coli    Culture - Blood (collected 19 Apr 2024 18:40)  Source: .Blood Blood-Peripheral  Gram Stain (20 Apr 2024 14:57):    Growth in anaerobic bottle: Gram Negative Rods  Final Report (22 Apr 2024 11:07):    Growth in anaerobic bottle: Escherichia coli    Direct identification is available within approximately 3-5    hours either by Blood Panel Multiplexed PCR or Direct    MALDI-TOF. Details: [email or web address]  Organism: Blood Culture PCR  Escherichia coli (22 Apr 2024 11:07)  Organism: Escherichia coli (22 Apr 2024 11:07)      Method Type: CARRILLO      -  Ampicillin: R >16 These ampicillin results predict results for amoxicillin      -  Ampicillin/Sulbactam: R >16/8      -  Aztreonam: S <=4      -  Cefazolin: R >16      -  Cefepime: S <=2      -  Cefoxitin: S <=8      -  Ceftriaxone: S <=1      -  Ciprofloxacin: S <=0.25      -  Ertapenem: S <=0.5      -  Gentamicin: S <=2      -  Imipenem: S <=1      -  Levofloxacin: S <=0.5      -  Meropenem: S <=1      -  Piperacillin/Tazobactam: S <=8      -  Tobramycin: S <=2      -  Trimethoprim/Sulfamethoxazole: S 1/19  Organism: Blood Culture PCR (22 Apr 2024 11:07)      Method Type: PCR      -  Escherichia coli: Detec    Culture - Blood (collected 19 Apr 2024 18:20)  Source: .Blood Blood-Peripheral  Preliminary Report (23 Apr 2024 02:01):    No growth at 72 Hours        RADIOLOGY & ADDITIONAL TESTS:    Care Discussed with Consultants/Other Providers:   
Patient is a 64y old  Male who presents with a chief complaint of Called in for positive blood cultures (22 Apr 2024 07:50)    Patient seen and examined at bedside. No overnight events reported. Patient c/o mild headache, received tylenol     ALLERGIES:  No Known Allergies    MEDICATIONS  (STANDING):  dextrose 10% Bolus 125 milliLiter(s) IV Bolus once  dextrose 5%. 1000 milliLiter(s) (50 mL/Hr) IV Continuous <Continuous>  dextrose 5%. 1000 milliLiter(s) (100 mL/Hr) IV Continuous <Continuous>  dextrose 50% Injectable 12.5 Gram(s) IV Push once  dextrose 50% Injectable 25 Gram(s) IV Push once  glucagon  Injectable 1 milliGRAM(s) IntraMuscular once  influenza   Vaccine 0.5 milliLiter(s) IntraMuscular once  insulin lispro (ADMELOG) corrective regimen sliding scale   SubCutaneous three times a day before meals  insulin lispro (ADMELOG) corrective regimen sliding scale   SubCutaneous at bedtime  piperacillin/tazobactam IVPB.. 3.375 Gram(s) IV Intermittent every 8 hours    MEDICATIONS  (PRN):  acetaminophen     Tablet .. 650 milliGRAM(s) Oral every 6 hours PRN Temp greater or equal to 38C (100.4F), Mild Pain (1 - 3)  dextrose Oral Gel 15 Gram(s) Oral once PRN Blood Glucose LESS THAN 70 milliGRAM(s)/deciliter  ondansetron Injectable 4 milliGRAM(s) IV Push every 8 hours PRN Nausea and/or Vomiting    Vital Signs Last 24 Hrs  T(F): 98.2 (22 Apr 2024 06:12), Max: 99.4 (21 Apr 2024 17:30)  HR: 89 (22 Apr 2024 06:12) (84 - 96)  BP: 149/88 (22 Apr 2024 06:12) (118/63 - 151/87)  RR: 14 (22 Apr 2024 06:12) (14 - 17)  SpO2: 93% (22 Apr 2024 06:12) (93% - 98%)  I&O's Summary    PHYSICAL EXAM:  General: NAD, A/O x 3  ENT: No gross hearing impairment, Moist mucous membranes, no thrush  Neck: Supple, No JVD  Lungs: Clear to auscultation bilaterally, good air entry, non-labored breathing  Cardio: RRR, S1/S2, No murmur  Abdomen: Soft, Nontender, Nondistended; Bowel sounds present  Extremities: No calf tenderness, No cyanosis, No pitting edema  Psych: Appropriate mood and affect    LABS:                        14.6   5.26  )-----------( 165      ( 22 Apr 2024 07:10 )             41.4     04-22    137  |  103  |  13  ----------------------------<  178  3.7   |  26  |  0.92    Ca    7.9      22 Apr 2024 07:10    TPro  7.0  /  Alb  3.0  /  TBili  1.4  /  DBili  x   /  AST  29  /  ALT  38  /  AlkPhos  66  04-21          PT/INR - ( 21 Apr 2024 11:05 )   PT: 13.2 sec;   INR: 1.13 ratio         PTT - ( 21 Apr 2024 11:05 )  PTT:27.8 sec  Lactate, Blood: 0.8 mmol/L (04-21 @ 11:05)  Lactate, Blood: 1.5 mmol/L (04-19 @ 18:20)                        POCT Blood Glucose.: 175 mg/dL (22 Apr 2024 11:37)  POCT Blood Glucose.: 174 mg/dL (22 Apr 2024 07:59)  POCT Blood Glucose.: 191 mg/dL (21 Apr 2024 22:00)  POCT Blood Glucose.: 154 mg/dL (21 Apr 2024 16:34)      Urinalysis Basic - ( 22 Apr 2024 07:10 )    Color: x / Appearance: x / SG: x / pH: x  Gluc: 178 mg/dL / Ketone: x  / Bili: x / Urobili: x   Blood: x / Protein: x / Nitrite: x   Leuk Esterase: x / RBC: x / WBC x   Sq Epi: x / Non Sq Epi: x / Bacteria: x        Culture - Urine (collected 19 Apr 2024 19:25)  Source: Clean Catch Clean Catch (Midstream)  Preliminary Report (21 Apr 2024 09:59):    >100,000 CFU/ml Escherichia coli    Culture - Blood (collected 19 Apr 2024 18:40)  Source: .Blood Blood-Peripheral  Gram Stain (20 Apr 2024 14:57):    Growth in anaerobic bottle: Gram Negative Rods  Final Report (22 Apr 2024 11:07):    Growth in anaerobic bottle: Escherichia coli    Direct identification is available within approximately 3-5    hours either by Blood Panel Multiplexed PCR or Direct    MALDI-TOF. Details: [email or web address]  Organism: Blood Culture PCR  Escherichia coli (22 Apr 2024 11:07)  Organism: Escherichia coli (22 Apr 2024 11:07)      Method Type: CARRILLO      -  Ampicillin: R >16 These ampicillin results predict results for amoxicillin      -  Ampicillin/Sulbactam: R >16/8      -  Aztreonam: S <=4      -  Cefazolin: R >16      -  Cefepime: S <=2      -  Cefoxitin: S <=8      -  Ceftriaxone: S <=1      -  Ciprofloxacin: S <=0.25      -  Ertapenem: S <=0.5      -  Gentamicin: S <=2      -  Imipenem: S <=1      -  Levofloxacin: S <=0.5      -  Meropenem: S <=1      -  Piperacillin/Tazobactam: S <=8      -  Tobramycin: S <=2      -  Trimethoprim/Sulfamethoxazole: S 1/19  Organism: Blood Culture PCR (22 Apr 2024 11:07)      Method Type: PCR      -  Escherichia coli: Detec    Culture - Blood (collected 19 Apr 2024 18:20)  Source: .Blood Blood-Peripheral  Preliminary Report (22 Apr 2024 02:02):    No growth at 48 Hours        RADIOLOGY & ADDITIONAL TESTS:    Care Discussed with Consultants/Other Providers:

## 2024-04-23 NOTE — PROGRESS NOTE ADULT - ASSESSMENT
63 y/o M with PMH of DM2 who was recently in ED for fevers and was diagnosed with pyelonephritis returns to ED after being called back for positive blood cultures admitted for ecoli bacteremia.     #Ecoli bacteremia  #Pyelonephritis  - Recent ED visit for pyelonephritis and discharged with PO vantin  - Now with ecoli bacteremia due to pyelonephritis noted on 4/19 blood cultures   - continue Ceftriaxone day 2   - CT abd on 4/19 without any stones/obstruction  - BC from 4/21 NGTD   - Repeat blood cultures pending   - ID consult - continue CTX, follow cultures     #DM2  - Home regimen includes metformin, toujeo, mounjaro   - Patient reports no appetite for last 4 days, has not taken insulin at home, FS 170s  - Started on moderate sliding scale and monitor FS  - Hold home insulin for now    #DVT ppx  - Low risk, ambulate     GOC full code     Dispo: Pending ABX course     4/23: patient updated, all questions answered   65 y/o M with PMH of DM2 who was recently in ED for fevers and was diagnosed with pyelonephritis returns to ED after being called back for positive blood cultures admitted for ecoli bacteremia.     #Ecoli bacteremia  #Pyelonephritis  - Recent ED visit for pyelonephritis and discharged with PO vantin  - Now with ecoli bacteremia due to pyelonephritis noted on 4/19 blood cultures   - continue Ceftriaxone day 2   - CT abd on 4/19 without any stones/obstruction  - BC from 4/21 NGTD   - Repeat blood cultures pending   - ID consult - continue CTX, follow cultures     #DM2  - Home regimen includes metformin, toujeo, mounjaro   - Patient reports no appetite for last 4 days, has not taken insulin at home, FS 170s  - Started on moderate sliding scale and monitor FS  - Hold home insulin for now    #DVT ppx  - Low risk, ambulate     GOC full code     Dispo: Pending ABX course, ID     4/23: patient updated, all questions answered - states he will update family himself

## 2024-04-23 NOTE — DISCHARGE NOTE PROVIDER - CARE PROVIDER_API CALL
Elieser Briones NP in Family [facility]  [address] [place] [id number]  Phone: [phone]  Fax: [phone]  Follow Up Time:

## 2024-04-23 NOTE — DISCHARGE NOTE NURSING/CASE MANAGEMENT/SOCIAL WORK - PATIENT PORTAL LINK FT
You can access the FollowMyHealth Patient Portal offered by [facility] by registering at the following website: [email or web address]. By joining [facility]’s FollowMyHealth portal, you will also be able to view your health information using other applications (apps) compatible with our system.

## 2024-04-23 NOTE — DISCHARGE NOTE PROVIDER - HOSPITAL COURSE
Hospital Course  HPI:  65 y/o M with PMH of DM2 who was recently in ED for fevers and was diagnosed with pyelonephritis returns to ED after being called back for positive blood cultures. Patient states that he was in ED on 4/19 for fevers, some dysuria and back pain. He was diagnosed with pyelonephritis and sent home with oral antibiotics (Vantin). He started taking antibiotics yesterday. He continues with feel lethargic and tired. He has chills and reports low grade fevers. No appetite, he has not eaten in 4 days and he has not been taking his insulin. He denies any dysuria, back pain, hematuria. Patient was treated with IV antibiotics and seen by ID for e coli bacteremia. CT abdomen did not show any stones/obstruction. 4/21 blood cultures were no growth to date. Patient is hemodynamically stable for discharge.       You will need to follow up with your primary care physician.      Antibiotic / Last Day: May 5th is last day of antibiotic, abx started 4/21 - 2 week total course      Discharging Provider:  Jin Farr NP  Contact Info: Cell [phone] - Please call with any questions or concerns.    Outpatient Provider: Dr. Elieser Briones        Hospital Course  HPI:  65 y/o M with PMH of DM2 who was recently in ED for fevers and was diagnosed with pyelonephritis returns to ED after being called back for positive blood cultures. Patient states that he was in ED on 4/19 for fevers, some dysuria and back pain. He was diagnosed with pyelonephritis and sent home with oral antibiotics (Vantin). He started taking antibiotics yesterday. He continues with feel lethargic and tired. He has chills and reports low grade fevers. No appetite, he has not eaten in 4 days and he has not been taking his insulin. He denies any dysuria, back pain, hematuria. Patient was treated with IV antibiotics and seen by ID for e coli bacteremia. CT abdomen did not show any stones/obstruction. 4/21 blood cultures were no growth to date. Patient is hemodynamically stable for discharge.       You will need to follow up with your primary care physician.      Antibiotic / Last Day: May 5th is last day of antibiotic, abx started 4/21 - 2 week total course       Discharging Provider:  Jin Farr NP  Contact Info: Cell [phone] - Please call with any questions or concerns.    Outpatient Provider: Dr. Elieser Briones        Hospital Course  HPI:  63 y/o M with PMH of DM2 who was recently in ED for fevers and was diagnosed with pyelonephritis returns to ED after being called back for positive blood cultures. Patient states that he was in ED on 4/19 for fevers, some dysuria and back pain. He was diagnosed with pyelonephritis and sent home with oral antibiotics (Vantin). He started taking antibiotics yesterday. He continues with feel lethargic and tired. He has chills and reports low grade fevers. No appetite, he has not eaten in 4 days and he has not been taking his insulin. He denies any dysuria, back pain, hematuria. Patient was treated with IV antibiotics and seen by ID for e coli bacteremia. CT abdomen did not show any stones/obstruction. 4/21 blood cultures were no growth to date. Patient is hemodynamically stable for discharge.       You will need to follow up with your primary care physician.      Antibiotic / Last Day: May 5th is last day of antibiotic, abx started 4/21 - 2 week total course      Discharging Provider:  Jin Farr NP  Contact Info: Cell [phone] - Please call with any questions or concerns.    Outpatient Provider: Dr. Elieser Briones

## 2024-04-23 NOTE — PROGRESS NOTE ADULT - REASON FOR ADMISSION
Called in for positive blood cultures

## 2024-04-23 NOTE — DISCHARGE NOTE PROVIDER - DISCHARGE DIET
Regular Diet - No restrictions Regular Diet - No restrictions/Consistent Carbohydrate Diabetic Diets

## 2024-04-23 NOTE — PROGRESS NOTE ADULT - NS ATTEND AMEND GEN_ALL_CORE FT
#E coli bacteremia  #Pyelonephritis  - blood cx sensitivity reviewed. switch IV zosyn to IV Rocephin 2g daily  - follow up urine cx sensitivity. grew >100k E coli  - ID consult  - follow up repeat blood cx
#Ecoli bacteremia- resolved   #Pyelonephritis  - pt remains afebrile, no leukocytosis and pt asymptomatic   - surveillance blood cx negative to date  - appreciate ID, convert to po ceftin 500mg BID  - DC today

## 2024-04-23 NOTE — DISCHARGE NOTE PROVIDER - NSDCCPCAREPLAN_GEN_ALL_CORE_FT
PRINCIPAL DISCHARGE DIAGNOSIS  Diagnosis: Bacteremia  Assessment and Plan of Treatment: You were found to have positive blood cultures, you were treated with a few days of IV antibiotics, you have now been transitioned to oral antibiotics. Please take as prescribed.     PRINCIPAL DISCHARGE DIAGNOSIS  Diagnosis: Bacteremia  Assessment and Plan of Treatment: You were found to have positive blood cultures, you were treated with a few days of IV antibiotics, you have now been transitioned to oral antibiotics. Please take as prescribed. Please start oral antibiotics tomorrow

## 2024-04-23 NOTE — DISCHARGE NOTE PROVIDER - NSDCMRMEDTOKEN_GEN_ALL_CORE_FT
metFORMIN 500 mg oral tablet: 1 tab(s) orally once a day  Mounjaro 5 mg/0.5 mL subcutaneous solution: 5 milligram(s) subcutaneously once a week  Robumary jane SoloStar 300 units/mL subcutaneous solution: 52 unit(s) subcutaneous once a day (at bedtime)   cefuroxime 500 mg oral tablet: 1 tab(s) orally 2 times a day  metFORMIN 500 mg oral tablet: 1 tab(s) orally once a day  Mounjaro 5 mg/0.5 mL subcutaneous solution: 5 milligram(s) subcutaneously once a week  Shonna SoloStar 300 units/mL subcutaneous solution: 52 unit(s) subcutaneous once a day (at bedtime)

## 2024-04-23 NOTE — PROGRESS NOTE ADULT - ASSESSMENT
64y male with PMH significant for asthma, DM, obesity & prior hx of pyelonephritis, had developed burning in micturition on 4/18/24. This was followed by frequency, urgency, low back pain & poor appetite. Presented to the ER on 4/19/24. Found to have high fevers, with tachycardia & leukocytosis of 18K. UA was cloudy with 40 WBCs, mod LE & nit. He was dx with pyelonephritis & sent home on PO Vantin.  Readmitted on 4/21/24 for E coli bacteremia, by then sepsis had resolved.  Feeling tired but all other symptoms have improved.  He is now feeling back to baseline and repeat blood cultures are negative.  The E coli is sensitive to the cephalosporins except cefazolin  PLAN:  Can switch to po ceftin 500 BID either later today or in AM to complete a 2 week course of antibiotics  No obvious reasons for bacteremic  infection.  ? Prostatic involvement  He appears to have responded well to antibiotics  Will defer to primary service on timing of discharge planning, ? later today or in AM

## 2024-04-24 ENCOUNTER — TRANSCRIPTION ENCOUNTER (OUTPATIENT)
Age: 64
End: 2024-04-24

## 2024-04-24 RX ORDER — TAMSULOSIN HYDROCHLORIDE 0.4 MG/1
0.4 CAPSULE ORAL
Qty: 60 | Refills: 0 | Status: ACTIVE | COMMUNITY
Start: 2022-01-18 | End: 1900-01-01

## 2024-04-24 RX ORDER — KETOCONAZOLE 20 MG/G
2 CREAM TOPICAL
Qty: 1 | Refills: 2 | Status: COMPLETED | COMMUNITY
Start: 2022-05-13 | End: 2024-04-24

## 2024-04-24 RX ORDER — METFORMIN ER 500 MG 500 MG/1
500 TABLET ORAL
Qty: 120 | Refills: 0 | Status: COMPLETED | COMMUNITY
Start: 2022-05-17 | End: 2024-04-24

## 2024-04-24 RX ORDER — TIRZEPATIDE 7.5 MG/.5ML
7.5 INJECTION, SOLUTION SUBCUTANEOUS
Qty: 1 | Refills: 3 | Status: COMPLETED | COMMUNITY
Start: 2023-09-22 | End: 2024-04-24

## 2024-04-24 RX ORDER — INSULIN GLARGINE 300 U/ML
300 INJECTION, SOLUTION SUBCUTANEOUS
Qty: 2 | Refills: 3 | Status: COMPLETED | COMMUNITY
Start: 2023-03-28 | End: 2024-04-24

## 2024-04-25 ENCOUNTER — APPOINTMENT (OUTPATIENT)
Dept: FAMILY MEDICINE | Facility: CLINIC | Age: 64
End: 2024-04-25
Payer: COMMERCIAL

## 2024-04-25 VITALS
SYSTOLIC BLOOD PRESSURE: 118 MMHG | WEIGHT: 256 LBS | DIASTOLIC BLOOD PRESSURE: 80 MMHG | OXYGEN SATURATION: 98 % | HEIGHT: 72 IN | RESPIRATION RATE: 17 BRPM | HEART RATE: 85 BPM | BODY MASS INDEX: 34.67 KG/M2 | TEMPERATURE: 97.3 F

## 2024-04-25 DIAGNOSIS — N12 TUBULO-INTERSTITIAL NEPHRITIS, NOT SPECIFIED AS ACUTE OR CHRONIC: ICD-10-CM

## 2024-04-25 DIAGNOSIS — E11.65 TYPE 2 DIABETES MELLITUS WITH HYPERGLYCEMIA: ICD-10-CM

## 2024-04-25 DIAGNOSIS — M54.2 CERVICALGIA: ICD-10-CM

## 2024-04-25 LAB
CULTURE RESULTS: SIGNIFICANT CHANGE UP
SPECIMEN SOURCE: SIGNIFICANT CHANGE UP

## 2024-04-25 PROCEDURE — 99496 TRANSJ CARE MGMT HIGH F2F 7D: CPT

## 2024-04-25 PROCEDURE — G2211 COMPLEX E/M VISIT ADD ON: CPT | Mod: NC,1L

## 2024-04-25 NOTE — PHYSICAL EXAM
[No Acute Distress] : no acute distress [Well Nourished] : well nourished [Well Developed] : well developed [Well-Appearing] : well-appearing [Normal Sclera/Conjunctiva] : normal sclera/conjunctiva [PERRL] : pupils equal round and reactive to light [EOMI] : extraocular movements intact [Normal Outer Ear/Nose] : the outer ears and nose were normal in appearance [Normal Oropharynx] : the oropharynx was normal [No JVD] : no jugular venous distention [No Lymphadenopathy] : no lymphadenopathy [Supple] : supple [Thyroid Normal, No Nodules] : the thyroid was normal and there were no nodules present [No Respiratory Distress] : no respiratory distress  [No Accessory Muscle Use] : no accessory muscle use [Clear to Auscultation] : lungs were clear to auscultation bilaterally [Normal Rate] : normal rate  [Regular Rhythm] : with a regular rhythm [Normal S1, S2] : normal S1 and S2 [No Murmur] : no murmur heard [No Carotid Bruits] : no carotid bruits [No Abdominal Bruit] : a ~M bruit was not heard ~T in the abdomen [No Varicosities] : no varicosities [Pedal Pulses Present] : the pedal pulses are present [No Edema] : there was no peripheral edema [No Palpable Aorta] : no palpable aorta [No Extremity Clubbing/Cyanosis] : no extremity clubbing/cyanosis [Soft] : abdomen soft [Non Tender] : non-tender [Non-distended] : non-distended [No Masses] : no abdominal mass palpated [No HSM] : no HSM [Normal Bowel Sounds] : normal bowel sounds [Normal Posterior Cervical Nodes] : no posterior cervical lymphadenopathy [Normal Anterior Cervical Nodes] : no anterior cervical lymphadenopathy [No CVA Tenderness] : no CVA  tenderness [No Spinal Tenderness] : no spinal tenderness [No Joint Swelling] : no joint swelling [Grossly Normal Strength/Tone] : grossly normal strength/tone [No Rash] : no rash [Coordination Grossly Intact] : coordination grossly intact [No Focal Deficits] : no focal deficits [Normal Gait] : normal gait [Deep Tendon Reflexes (DTR)] : deep tendon reflexes were 2+ and symmetric [Normal Affect] : the affect was normal [Normal Insight/Judgement] : insight and judgment were intact [02893 - High Complexity requires an extensive number of possible diagnoses and/or the management options, extensive complexity of the medical data (tests, etc.) to be reviewed, and a high risk of significant complications, morbidity, and/or mortality as w] : High Complexity

## 2024-04-25 NOTE — ASSESSMENT
[FreeTextEntry1] : For completeness sake we will have patient evaluated by urology.  It is now the second time he has been hospitalized with UTI.  Likely related to diabetes, but will like to rule out significant BPH. No noted stone or structural abnormality noted on imaging during hospitalization.  Did show enlarged prostate Discussed increasing hydration Discussed completing antibiotics Will return back to office of further signs of UTI Is under the care of of diabetic provider.  In the process of getting Mounjaro

## 2024-04-25 NOTE — HISTORY OF PRESENT ILLNESS
[Admitted on: ___] : The patient was admitted on [unfilled] [Discharged on ___] : discharged on [unfilled] [Discharge Summary] : discharge summary [Pertinent Labs] : pertinent labs [Radiology Findings] : radiology findings [Discharge Med List] : discharge medication list [Med Reconciliation] : medication reconciliation has been completed [FreeTextEntry2] : Hospital Course: Discharge Date	23-Apr-2024 Admission Date	21-Apr-2024 12:40 Reason for Admission	Called in for positive blood cultures Hospital Course	 Hospital Course HPI: 63 y/o M with PMH of DM2 who was recently in ED for fevers and was diagnosed with pyelonephritis returns to ED after being called back for positive blood cultures. Patient states that he was in ED on 4/19 for fevers, some dysuria and back pain. He was diagnosed with pyelonephritis and sent home with oral antibiotics (Vantin). He started taking antibiotics yesterday. He continues with feel lethargic and tired. He has chills and reports low grade fevers. No appetite, he has not eaten in 4 days and he has not been taking his insulin. He denies any dysuria, back pain, hematuria. Patient was treated with IV antibiotics and seen by ID for e coli bacteremia. CT abdomen did not show any stones/obstruction. 4/21 blood cultures were no growth to date. Patient is hemodynamically stable for discharge.   Completing course of Ceftin Feeling well and denies any LUTS, fever, chills, or lightheadedness.  Is suffering from intermittent neck pain specifically at night which is preventing him from sleeping throughout the night.

## 2024-04-26 LAB
CULTURE RESULTS: SIGNIFICANT CHANGE UP
CULTURE RESULTS: SIGNIFICANT CHANGE UP
SPECIMEN SOURCE: SIGNIFICANT CHANGE UP
SPECIMEN SOURCE: SIGNIFICANT CHANGE UP

## 2024-05-01 ENCOUNTER — TRANSCRIPTION ENCOUNTER (OUTPATIENT)
Age: 64
End: 2024-05-01

## 2024-05-01 RX ORDER — CHOLECALCIFEROL (VITAMIN D3) 50 MCG
50 MCG TABLET ORAL
Qty: 30 | Refills: 5 | Status: ACTIVE | COMMUNITY
Start: 2023-10-17 | End: 1900-01-01

## 2024-05-15 ENCOUNTER — NON-APPOINTMENT (OUTPATIENT)
Age: 64
End: 2024-05-15

## 2024-07-01 RX ORDER — METFORMIN ER 500 MG 500 MG/1
500 TABLET ORAL
Qty: 120 | Refills: 5 | Status: ACTIVE | COMMUNITY
Start: 2024-07-01 | End: 1900-01-01

## 2024-07-08 ENCOUNTER — TRANSCRIPTION ENCOUNTER (OUTPATIENT)
Age: 64
End: 2024-07-08

## 2024-07-19 ENCOUNTER — TRANSCRIPTION ENCOUNTER (OUTPATIENT)
Age: 64
End: 2024-07-19

## 2024-08-19 ENCOUNTER — TRANSCRIPTION ENCOUNTER (OUTPATIENT)
Age: 64
End: 2024-08-19

## 2024-08-20 ENCOUNTER — NON-APPOINTMENT (OUTPATIENT)
Age: 64
End: 2024-08-20

## 2024-08-20 ENCOUNTER — TRANSCRIPTION ENCOUNTER (OUTPATIENT)
Age: 64
End: 2024-08-20

## 2024-08-31 ENCOUNTER — NON-APPOINTMENT (OUTPATIENT)
Age: 64
End: 2024-08-31

## 2024-09-09 ENCOUNTER — NON-APPOINTMENT (OUTPATIENT)
Age: 64
End: 2024-09-09

## 2024-09-09 ENCOUNTER — TRANSCRIPTION ENCOUNTER (OUTPATIENT)
Age: 64
End: 2024-09-09

## 2024-09-22 ENCOUNTER — EMERGENCY (EMERGENCY)
Facility: HOSPITAL | Age: 64
LOS: 1 days | Discharge: HOME | End: 2024-09-22
Attending: EMERGENCY MEDICINE | Admitting: EMERGENCY MEDICINE
Payer: COMMERCIAL

## 2024-09-22 VITALS
TEMPERATURE: 98 F | DIASTOLIC BLOOD PRESSURE: 97 MMHG | SYSTOLIC BLOOD PRESSURE: 154 MMHG | WEIGHT: 253.09 LBS | HEART RATE: 82 BPM | OXYGEN SATURATION: 97 % | HEIGHT: 73 IN | RESPIRATION RATE: 17 BRPM

## 2024-09-22 LAB
ALBUMIN SERPL ELPH-MCNC: 3.3 G/DL — SIGNIFICANT CHANGE UP (ref 3.3–5)
ALP SERPL-CCNC: 61 U/L — SIGNIFICANT CHANGE UP (ref 40–120)
ALT FLD-CCNC: 24 U/L — SIGNIFICANT CHANGE UP (ref 10–45)
ANION GAP SERPL CALC-SCNC: 8 MMOL/L — SIGNIFICANT CHANGE UP (ref 5–17)
AST SERPL-CCNC: 10 U/L — SIGNIFICANT CHANGE UP (ref 10–40)
BASOPHILS # BLD AUTO: 0.03 K/UL — SIGNIFICANT CHANGE UP (ref 0–0.2)
BASOPHILS NFR BLD AUTO: 0.4 % — SIGNIFICANT CHANGE UP (ref 0–2)
BILIRUB SERPL-MCNC: 1.4 MG/DL — HIGH (ref 0.2–1.2)
BUN SERPL-MCNC: 18 MG/DL — SIGNIFICANT CHANGE UP (ref 7–23)
CALCIUM SERPL-MCNC: 8.7 MG/DL — SIGNIFICANT CHANGE UP (ref 8.4–10.5)
CHLORIDE SERPL-SCNC: 103 MMOL/L — SIGNIFICANT CHANGE UP (ref 96–108)
CO2 SERPL-SCNC: 26 MMOL/L — SIGNIFICANT CHANGE UP (ref 22–31)
CREAT SERPL-MCNC: 1 MG/DL — SIGNIFICANT CHANGE UP (ref 0.5–1.3)
EGFR: 85 ML/MIN/1.73M2 — SIGNIFICANT CHANGE UP
EOSINOPHIL # BLD AUTO: 0.05 K/UL — SIGNIFICANT CHANGE UP (ref 0–0.5)
EOSINOPHIL NFR BLD AUTO: 0.7 % — SIGNIFICANT CHANGE UP (ref 0–6)
GLUCOSE BLDC GLUCOMTR-MCNC: 280 MG/DL — HIGH (ref 70–99)
GLUCOSE SERPL-MCNC: 344 MG/DL — HIGH (ref 70–99)
HCT VFR BLD CALC: 45 % — SIGNIFICANT CHANGE UP (ref 39–50)
HGB BLD-MCNC: 16 G/DL — SIGNIFICANT CHANGE UP (ref 13–17)
IMM GRANULOCYTES NFR BLD AUTO: 0.4 % — SIGNIFICANT CHANGE UP (ref 0–0.9)
LYMPHOCYTES # BLD AUTO: 1.02 K/UL — SIGNIFICANT CHANGE UP (ref 1–3.3)
LYMPHOCYTES # BLD AUTO: 13.3 % — SIGNIFICANT CHANGE UP (ref 13–44)
MCHC RBC-ENTMCNC: 30.5 PG — SIGNIFICANT CHANGE UP (ref 27–34)
MCHC RBC-ENTMCNC: 35.6 GM/DL — SIGNIFICANT CHANGE UP (ref 32–36)
MCV RBC AUTO: 85.7 FL — SIGNIFICANT CHANGE UP (ref 80–100)
MONOCYTES # BLD AUTO: 0.68 K/UL — SIGNIFICANT CHANGE UP (ref 0–0.9)
MONOCYTES NFR BLD AUTO: 8.9 % — SIGNIFICANT CHANGE UP (ref 2–14)
NEUTROPHILS # BLD AUTO: 5.85 K/UL — SIGNIFICANT CHANGE UP (ref 1.8–7.4)
NEUTROPHILS NFR BLD AUTO: 76.3 % — SIGNIFICANT CHANGE UP (ref 43–77)
NRBC # BLD: 0 /100 WBCS — SIGNIFICANT CHANGE UP (ref 0–0)
PLATELET # BLD AUTO: 157 K/UL — SIGNIFICANT CHANGE UP (ref 150–400)
POTASSIUM SERPL-MCNC: 3.8 MMOL/L — SIGNIFICANT CHANGE UP (ref 3.5–5.3)
POTASSIUM SERPL-SCNC: 3.8 MMOL/L — SIGNIFICANT CHANGE UP (ref 3.5–5.3)
PROT SERPL-MCNC: 6.7 G/DL — SIGNIFICANT CHANGE UP (ref 6–8.3)
RBC # BLD: 5.25 M/UL — SIGNIFICANT CHANGE UP (ref 4.2–5.8)
RBC # FLD: 12.6 % — SIGNIFICANT CHANGE UP (ref 10.3–14.5)
SODIUM SERPL-SCNC: 137 MMOL/L — SIGNIFICANT CHANGE UP (ref 135–145)
WBC # BLD: 7.66 K/UL — SIGNIFICANT CHANGE UP (ref 3.8–10.5)
WBC # FLD AUTO: 7.66 K/UL — SIGNIFICANT CHANGE UP (ref 3.8–10.5)

## 2024-09-22 PROCEDURE — 46040 I&D ISCHIORCT&/PERIRCT ABSC: CPT

## 2024-09-22 PROCEDURE — 36415 COLL VENOUS BLD VENIPUNCTURE: CPT

## 2024-09-22 PROCEDURE — 74177 CT ABD & PELVIS W/CONTRAST: CPT | Mod: MC

## 2024-09-22 PROCEDURE — 46050 I&D PERIANAL ABSCESS SUPFC: CPT

## 2024-09-22 PROCEDURE — 87077 CULTURE AEROBIC IDENTIFY: CPT

## 2024-09-22 PROCEDURE — 99285 EMERGENCY DEPT VISIT HI MDM: CPT

## 2024-09-22 PROCEDURE — 85025 COMPLETE CBC W/AUTO DIFF WBC: CPT

## 2024-09-22 PROCEDURE — 99284 EMERGENCY DEPT VISIT MOD MDM: CPT | Mod: 25

## 2024-09-22 PROCEDURE — 82962 GLUCOSE BLOOD TEST: CPT

## 2024-09-22 PROCEDURE — 80053 COMPREHEN METABOLIC PANEL: CPT

## 2024-09-22 PROCEDURE — 96361 HYDRATE IV INFUSION ADD-ON: CPT | Mod: XU

## 2024-09-22 PROCEDURE — 96374 THER/PROPH/DIAG INJ IV PUSH: CPT | Mod: XU

## 2024-09-22 PROCEDURE — 99285 EMERGENCY DEPT VISIT HI MDM: CPT | Mod: 57,25

## 2024-09-22 PROCEDURE — 74177 CT ABD & PELVIS W/CONTRAST: CPT | Mod: 26,MC

## 2024-09-22 PROCEDURE — 87070 CULTURE OTHR SPECIMN AEROBIC: CPT

## 2024-09-22 PROCEDURE — 87186 SC STD MICRODIL/AGAR DIL: CPT

## 2024-09-22 PROCEDURE — 87205 SMEAR GRAM STAIN: CPT

## 2024-09-22 RX ORDER — CLOTRIMAZOLE 1 %
1 CREAM (GRAM) TOPICAL ONCE
Refills: 0 | Status: COMPLETED | OUTPATIENT
Start: 2024-09-22 | End: 2024-09-22

## 2024-09-22 RX ORDER — AMOXICILLIN AND CLAVULANATE POTASSIUM 250; 125 MG/1; MG/1
875 TABLET, FILM COATED ORAL
Qty: 14 | Refills: 0
Start: 2024-09-22 | End: 2024-09-28

## 2024-09-22 RX ORDER — SODIUM CHLORIDE 9 MG/ML
1000 INJECTION INTRAMUSCULAR; INTRAVENOUS; SUBCUTANEOUS ONCE
Refills: 0 | Status: COMPLETED | OUTPATIENT
Start: 2024-09-22 | End: 2024-09-22

## 2024-09-22 RX ORDER — LIDOCAINE HCL 20 MG/ML
10 VIAL (ML) INJECTION ONCE
Refills: 0 | Status: COMPLETED | OUTPATIENT
Start: 2024-09-22 | End: 2024-09-22

## 2024-09-22 RX ORDER — PIPERACILLIN SODIUM AND TAZOBACTAM SODIUM 3; .375 G/15ML; G/15ML
3.38 INJECTION, POWDER, FOR SOLUTION INTRAVENOUS ONCE
Refills: 0 | Status: COMPLETED | OUTPATIENT
Start: 2024-09-22 | End: 2024-09-22

## 2024-09-22 RX ADMIN — PIPERACILLIN SODIUM AND TAZOBACTAM SODIUM 200 GRAM(S): 3; .375 INJECTION, POWDER, FOR SOLUTION INTRAVENOUS at 14:16

## 2024-09-22 RX ADMIN — Medication 10 MILLILITER(S): at 14:27

## 2024-09-22 RX ADMIN — SODIUM CHLORIDE 1000 MILLILITER(S): 9 INJECTION INTRAMUSCULAR; INTRAVENOUS; SUBCUTANEOUS at 12:21

## 2024-09-22 RX ADMIN — SODIUM CHLORIDE 1000 MILLILITER(S): 9 INJECTION INTRAMUSCULAR; INTRAVENOUS; SUBCUTANEOUS at 13:21

## 2024-09-22 RX ADMIN — Medication 1 APPLICATION(S): at 13:21

## 2024-09-22 NOTE — ED ADULT NURSE NOTE - NSFALLUNIVINTERV_ED_ALL_ED
Bed/Stretcher in lowest position, wheels locked, appropriate side rails in place/Call bell, personal items and telephone in reach/Instruct patient to call for assistance before getting out of bed/chair/stretcher/Non-slip footwear applied when patient is off stretcher/[place] to call system/Physically safe environment - no spills, clutter or unnecessary equipment/Purposeful proactive rounding/Room/bathroom lighting operational, light cord in reach

## 2024-09-22 NOTE — ED PROVIDER NOTE - OBJECTIVE STATEMENT
64-year-old male history of diabetes on metformin and insulin presents to the emergency department for rectal pain.  Patient states he has a history of having had a rectal abscess in the past and this feels similar however today is only day 2.  Patient states that it is to the point where he is uncomfortable sitting down.  Having normal bowel movements.  No blood.  No fever or chills.  Patient has been having issues with his glucose being elevated as of the past 6 months.  He is in flux because he was once on Ozempic and it is no longer covered by his insurance.  Patient states he is maxing out on insulin and metformin.  He takes up to 2000 mg of metformin each day.  Patient also states that he takes up to 82 units of insulin.  He has been discussing with Dr. Godfrey and Elieser Briones and he states that they are currently waiting for Ozempic to do something different?  Unclear.  However the patient is here reporting not only that he has rectal pain but that he is noticing discharge around the glans penis.  No other reported symptoms.

## 2024-09-22 NOTE — ED PROVIDER NOTE - PATIENT PORTAL LINK FT
You can access the FollowMyHealth Patient Portal offered by [facility] by registering at the following website: [email or web address]. By joining [facility]’s FollowMyHealth portal, you will also be able to view your health information using other applications (apps) compatible with our system.

## 2024-09-22 NOTE — ED ADULT TRIAGE NOTE - GLASGOW COMA SCALE: BEST MOTOR RESPONSE, MLM
Problem: Infection - Central Venous Catheter-Associated Bloodstream Infection:  Goal: Will show no infection signs and symptoms  Description: Will show no infection signs and symptoms   Outcome: Adequate for Discharge     Problem: KNOWLEDGE DEFICIT  Goal: Patient/S.O. demonstrates understanding of disease process, treatment plan, medications, and discharge instructions.   Outcome: Adequate for Discharge     Problem: Pain  Goal: Verbalizes/displays adequate comfort level or baseline comfort level  Outcome: Adequate for Discharge
(M6) obeys commands

## 2024-09-22 NOTE — CONSULT NOTE ADULT - ASSESSMENT
This is a very pleasant 64M w/ a PMH significant for DM and perirectal abscess s/p drainage in the past who is here with a perirectal abscess. He is afebrile, hemodynamically stable.    Risks and benefits of bedside incision and drainage were discussed and the patient wished to proceed with bedside procedure. He tolerated the procedure well. Culture was sent. Preop  antibiotics were administered. Plan to continue post op for about a week due to patient's uncontrolled DM.     Can follow up in office in a week.    Plan discussed with the patient and Dr. Thomas who were in agreement with plan.

## 2024-09-22 NOTE — CONSULT NOTE ADULT - SUBJECTIVE AND OBJECTIVE BOX
HPI:   This is a very pleasant 64M w/ a PMH significant for DM and perirectal abscess s/p drainage in the past who is here with perianal pain which has been going on for about 2 days. He has no systemic fevers chills or sweats. He has not had issues having BMs. No chronic abdominal pain, no history of inflammatory bowel disease. He has never had a colonoscopy. No history of MRSA or recurrent infections. No recent trauma to the area. No smoking or steroid use. No drainage in the area.    PMH/PSH:    DM, perirectal abscess s/p drainage, asthma  Social: no smoking, social ETOH, no drugs  Meds:   · 	cefuroxime 500 mg oral tablet: 1 tab(s) orally 2 times a day  · 	metFORMIN 500 mg oral tablet: 1 tab(s) orally once a day  · 	Toujeo SoloStar 300 units/mL subcutaneous solution: 52 unit(s) subcutaneous once a day (at bedtime)  · 	Mounjaro 5 mg/0.5 mL subcutaneous solution: 5 milligram(s) subcutaneously once a week  Fam hx: Noncontributory  NKDA    ROS: A 10 point ROS was performed for which the pertinent positives and negatives were noted in the HPI. All others were reviewed and were negative.    Vital Signs Last 24 Hrs  T(C): 36.9 (22 Sep 2024 11:40), Max: 36.9 (22 Sep 2024 11:40)  T(F): 98.5 (22 Sep 2024 11:40), Max: 98.5 (22 Sep 2024 11:40)  HR: 82 (22 Sep 2024 11:40) (82 - 82)  BP: 154/97 (22 Sep 2024 11:40) (154/97 - 154/97)  RR: 17 (22 Sep 2024 11:40) (17 - 17)  SpO2: 97% (22 Sep 2024 11:40) (97% - 97%)  General: No acute distress  HEENT: Normocephalic, atraumatic  Respiratory: Nonlabored breathing  Abdomen: soft, non-tender, non-distended  Extremities: no edema  Psych: calm affect  Neuro: No focal deficits appreciated  Rectal: no masses felt, but can feel fluctuance in the left posterolateral region    Labs:   WBC 7 BUN/Cr 18/1 glucose 280    CT abdomen/pelvis:  LEFT posterolateral perirectal/perianal abscess measuring 5.2 x 1.8 x 1 3.9 cm.

## 2024-09-22 NOTE — ED PROVIDER NOTE - PHYSICAL EXAMINATION
Gen: Well appearing in NAD  Head: NC/AT  Neck: trachea midline  Resp:  No distress  Ext: no deformities  Abd: soft nt nd  : Negrito RN Chaperone, Glans penis with + discharge and irritation/erythema. Uncircumcised. No para or phimosis.   Rectal exam with deep palpable firmness and tenderness at about 11oclock. No obvious swelling noted.   Neuro:  A&O appears non focal  Skin:  Warm and dry as visualized  Psych:  Normal affect and mood

## 2024-09-22 NOTE — ED ADULT TRIAGE NOTE - HEIGHT IN CM
Yasmin Clarke  3/20/2018  11:29 AM      There were no vitals filed for this visit. :    Wt Readings from Last 1 Encounters:   03/12/18 190 lb (86.2 kg)                Current Outpatient Prescriptions:     XIIDRA 5 % SOLN, INSTILL ONE DROP INTO EACH EYE TWICE DAILY, Disp: , Rfl: 11    diclofenac sodium (VOLTAREN) 1 % GEL, Apply 4 g topically 4 times daily, Disp: 5 Tube, Rfl: 0    anastrozole (ARIMIDEX) 1 MG tablet, , Disp: , Rfl:     levothyroxine (SYNTHROID) 100 MCG tablet, Take 100 mcg by mouth Daily. , Disp: , Rfl:     Vitamin D (CHOLECALCIFEROL) 1000 UNITS CAPS capsule, Take 5,000 Units by mouth daily , Disp: , Rfl:     Multiple Vitamins-Minerals (THERAPEUTIC MULTIVITAMIN-MINERALS) tablet, Take 1 tablet by mouth daily. , Disp: , Rfl:       Patient is seen today in follow up-6 month  Here for follow up for left breast cancer/radiation. She states she is having a lot of pain issues. She is wearing a knee brace on the left side for muscle spasms/pain. She also states she has cataracts and occasional burning on her skin. She follows with Dr Patricia Frazier for Medical Oncology and is on Arimadex. She had a mammogram 3/12/18 at [facility] that was negative. FALLS RISK SCREEN  Instructions:  Assess the patient and enter the appropriate indicators that are present for fall risk identification. Total the numbers entered and assign a fall risk score from Table 2.  Reassess patient at a minimum every 12 weeks or with status change. Assessment   Date  3/20/2018   1. Mental Ability: confusion/cognitively impaired 0 (3)   2. Elimination Issues: incontinence, frequency 3 (3)   3. Ambulatory: use of assistive devices (walker, cane, off-loading devices),        attached to equipment (IV pole, oxygen) 0 (2)   4.  Sensory Limitations: dizziness, vertigo, impaired vision 3 (3)   5. Age less than 65                 0 (0)   6. Age 72 or greater 0 (1)   7.   Medication: diuretics, strong analgesics, 185.42

## 2024-09-22 NOTE — ED PROVIDER NOTE - CLINICAL SUMMARY MEDICAL DECISION MAKING FREE TEXT BOX
64-year-old male history of diabetes on metformin and insulin presents to the emergency department for rectal pain.  Patient states he has a history of having had a rectal abscess in the past and this feels similar however today is only day 2.  Patient states that it is to the point where he is uncomfortable sitting down.  Having normal bowel movements.  No blood.  No fever or chills.  Patient has been having issues with his glucose being elevated as of the past 6 months.  He is in flux because he was once on Ozempic and it is no longer covered by his insurance.  Patient states he is maxing out on insulin and metformin.  He takes up to 2000 mg of metformin each day.  Patient also states that he takes up to 82 units of insulin.  He has been discussing with Dr. Godfrey and Elieser Briones and he states that they are currently waiting for Ozempic to do something different?  Unclear.  However the patient is here reporting not only that he has rectal pain but that he is noticing discharge around the glans penis.  No other reported symptoms.  Exam as stated.   Plan for CT to assess depth of perianal abscess.   Topical for balanitis.   Fluids for hyperglycemia. 64-year-old male history of diabetes on metformin and insulin presents to the emergency department for rectal pain.  Patient states he has a history of having had a rectal abscess in the past and this feels similar however today is only day 2.  Patient states that it is to the point where he is uncomfortable sitting down.  Having normal bowel movements.  No blood.  No fever or chills.  Patient has been having issues with his glucose being elevated as of the past 6 months.  He is in flux because he was once on Ozempic and it is no longer covered by his insurance.  Patient states he is maxing out on insulin and metformin.  He takes up to 2000 mg of metformin each day.  Patient also states that he takes up to 82 units of insulin.  He has been discussing with Dr. Godfrey and Elieser Briones and he states that they are currently waiting for Ozempic to do something different?  Unclear.  However the patient is here reporting not only that he has rectal pain but that he is noticing discharge around the glans penis.  No other reported symptoms.  Exam as stated.   Plan for CT to assess depth of perianal abscess.   Topical for balanitis.   Fluids for hyperglycemia.    Abscess identified on CT.  Discussed with general surgery who came to the ED to drain.  Patient ready for discharge.  Given IV Zosyn and discharged with Augmentin.  Patient can follow-up outpatient with Dr. Tony

## 2024-09-22 NOTE — ED ADULT NURSE NOTE - OBJECTIVE STATEMENT
PT presents to the ED with c/o rectal pain. States he thinks he has a perianal abscess. Pt had one 2 years ago and states it feels the same.

## 2024-09-22 NOTE — ED PROVIDER NOTE - NSFOLLOWUPINSTRUCTIONS_ED_ALL_ED_FT
You have a perirectal or perianal abscess.  It has been drained in the emergency department by Dr. Shi Tony, general surgeon.  We are prescribing an antibiotic for you, Augmentin 875 twice daily for 7 days.  Follow-up outpatient with your primary medical doctor as well.  You also have balanitis.  We are prescribing clotrimazole of which we have filled for you here in the emergency department.  Apply this to the head of the penis underneath the foreskin twice per day for 2 weeks. When you pull the foreskin back to apply the cream do not forget to put the foreskin back over the head of the penis. You may also follow-up with your primary medical doctor for this as well.  Both of these infections are sequelae of persistent hyperglycemia.  Having high glucose within the bloodstream puts you at risk for many complications such as infection.  It is imperative that you follow-up with your primary medical doctors for management of your glucose values.  Return to the emergency department if there is any worsening or concerns.

## 2024-09-22 NOTE — ED PROVIDER NOTE - CARE PROVIDER_API CALL
Shi Tony  Surgery  [address], [address]  [place] [address]  Phone: [phone]  Fax: [phone]  Follow Up Time:

## 2024-09-22 NOTE — ED PROVIDER NOTE - CARE PLAN
Principal Discharge DX:	Perianal abscess  Secondary Diagnosis:	Hyperglycemia  Secondary Diagnosis:	Balanitis   1

## 2024-09-23 DIAGNOSIS — K61.0 ANAL ABSCESS: ICD-10-CM

## 2024-09-23 LAB
GRAM STN FLD: ABNORMAL
SPECIMEN SOURCE: SIGNIFICANT CHANGE UP

## 2024-09-23 NOTE — ED POST DISCHARGE NOTE - DETAILS
Patient called back for the certified letter- culture susceptible to augmentin, pt feels improved, will make an appointment with

## 2024-09-23 NOTE — ED POST DISCHARGE NOTE - RESULT SUMMARY
gram stain growing few gram neg and gram positive cocci; currently on augmentin. pending sensitivity

## 2024-09-23 NOTE — PROCEDURE NOTE - ADDITIONAL PROCEDURE DETAILS
There was no superficial area of fluctuance felt. However, CT demonstrated left posterolateral perirectal abscess. Area of fluctuance felt on rectal exam. Thus, 18 gauge needle was utilized to aspirate > 10 cc of purulent output. An attempt was made to make an incision overlying that area, but I did not deepen the incision as the cavity likely collapsed after aspiration. The patient tolerated the procedure well.

## 2024-09-25 LAB
-  AMOXICILLIN/CLAVULANIC ACID: SIGNIFICANT CHANGE UP
-  AMPICILLIN/SULBACTAM: SIGNIFICANT CHANGE UP
-  AMPICILLIN: SIGNIFICANT CHANGE UP
-  AZTREONAM: SIGNIFICANT CHANGE UP
-  CEFAZOLIN: SIGNIFICANT CHANGE UP
-  CEFEPIME: SIGNIFICANT CHANGE UP
-  CEFOXITIN: SIGNIFICANT CHANGE UP
-  CEFTRIAXONE: SIGNIFICANT CHANGE UP
-  CIPROFLOXACIN: SIGNIFICANT CHANGE UP
-  ERTAPENEM: SIGNIFICANT CHANGE UP
-  GENTAMICIN: SIGNIFICANT CHANGE UP
-  IMIPENEM: SIGNIFICANT CHANGE UP
-  LEVOFLOXACIN: SIGNIFICANT CHANGE UP
-  MEROPENEM: SIGNIFICANT CHANGE UP
-  PIPERACILLIN/TAZOBACTAM: SIGNIFICANT CHANGE UP
-  TOBRAMYCIN: SIGNIFICANT CHANGE UP
-  TRIMETHOPRIM/SULFAMETHOXAZOLE: SIGNIFICANT CHANGE UP
METHOD TYPE: SIGNIFICANT CHANGE UP

## 2024-09-27 LAB
CULTURE RESULTS: ABNORMAL
ORGANISM # SPEC MICROSCOPIC CNT: ABNORMAL
ORGANISM # SPEC MICROSCOPIC CNT: SIGNIFICANT CHANGE UP
SPECIMEN SOURCE: SIGNIFICANT CHANGE UP

## 2024-10-21 ENCOUNTER — NON-APPOINTMENT (OUTPATIENT)
Age: 64
End: 2024-10-21

## 2024-10-22 ENCOUNTER — TRANSCRIPTION ENCOUNTER (OUTPATIENT)
Age: 64
End: 2024-10-22

## 2024-10-25 ENCOUNTER — RX RENEWAL (OUTPATIENT)
Age: 64
End: 2024-10-25

## 2024-10-29 ENCOUNTER — NON-APPOINTMENT (OUTPATIENT)
Age: 64
End: 2024-10-29

## 2024-10-30 ENCOUNTER — TRANSCRIPTION ENCOUNTER (OUTPATIENT)
Age: 64
End: 2024-10-30

## 2024-10-30 ENCOUNTER — RX RENEWAL (OUTPATIENT)
Age: 64
End: 2024-10-30

## 2024-10-30 RX ORDER — TIRZEPATIDE 5 MG/.5ML
5 INJECTION, SOLUTION SUBCUTANEOUS
Qty: 1 | Refills: 2 | Status: ACTIVE | COMMUNITY
Start: 2024-10-30 | End: 1900-01-01

## 2024-10-30 RX ORDER — BLOOD-GLUCOSE SENSOR
EACH MISCELLANEOUS
Qty: 2 | Refills: 5 | Status: ACTIVE | COMMUNITY
Start: 2024-10-30 | End: 1900-01-01

## 2024-11-13 ENCOUNTER — APPOINTMENT (OUTPATIENT)
Dept: FAMILY MEDICINE | Facility: CLINIC | Age: 64
End: 2024-11-13
Payer: COMMERCIAL

## 2024-11-13 ENCOUNTER — RESULT CHARGE (OUTPATIENT)
Age: 64
End: 2024-11-13

## 2024-11-13 DIAGNOSIS — R35.0 FREQUENCY OF MICTURITION: ICD-10-CM

## 2024-11-13 DIAGNOSIS — Z59.71 INSUFFICIENT HEALTH INSURANCE COVERAGE: ICD-10-CM

## 2024-11-13 DIAGNOSIS — E66.811 OBESITY, CLASS 1: ICD-10-CM

## 2024-11-13 DIAGNOSIS — R73.9 HYPERGLYCEMIA, UNSPECIFIED: ICD-10-CM

## 2024-11-13 PROCEDURE — 95251 CONT GLUC MNTR ANALYSIS I&R: CPT

## 2024-11-13 PROCEDURE — 99215 OFFICE O/P EST HI 40 MIN: CPT | Mod: 25

## 2024-11-13 PROCEDURE — 83036 HEMOGLOBIN GLYCOSYLATED A1C: CPT | Mod: QW

## 2024-11-13 RX ORDER — INSULIN GLARGINE 100 [IU]/ML
100 INJECTION, SOLUTION SUBCUTANEOUS
Qty: 3 | Refills: 3 | Status: ACTIVE | COMMUNITY
Start: 2024-11-13 | End: 1900-01-01

## 2024-11-15 LAB
ALBUMIN SERPL ELPH-MCNC: 4.1 G/DL
ALP BLD-CCNC: 53 U/L
ALT SERPL-CCNC: 33 U/L
ANION GAP SERPL CALC-SCNC: 14 MMOL/L
AST SERPL-CCNC: 15 U/L
BILIRUB SERPL-MCNC: 1 MG/DL
BUN SERPL-MCNC: 23 MG/DL
CALCIUM SERPL-MCNC: 9.4 MG/DL
CHLORIDE SERPL-SCNC: 99 MMOL/L
CHOLEST SERPL-MCNC: 119 MG/DL
CO2 SERPL-SCNC: 23 MMOL/L
CREAT SERPL-MCNC: 0.99 MG/DL
CREAT SPEC-SCNC: 35 MG/DL
EGFR: 86 ML/MIN/1.73M2
ESTIMATED AVERAGE GLUCOSE: 349 MG/DL
GLUCOSE SERPL-MCNC: 483 MG/DL
HBA1C MFR BLD HPLC: 13.8 %
HDLC SERPL-MCNC: 39 MG/DL
LDLC SERPL CALC-MCNC: 36 MG/DL
MICROALBUMIN 24H UR DL<=1MG/L-MCNC: <1.2 MG/DL
MICROALBUMIN/CREAT 24H UR-RTO: NORMAL MG/G
NONHDLC SERPL-MCNC: 81 MG/DL
POTASSIUM SERPL-SCNC: 4.6 MMOL/L
PROT SERPL-MCNC: 6.9 G/DL
SODIUM SERPL-SCNC: 136 MMOL/L
TRIGL SERPL-MCNC: 300 MG/DL
TSH SERPL-ACNC: 0.97 UIU/ML
VIT B12 SERPL-MCNC: 356 PG/ML

## 2024-11-20 ENCOUNTER — APPOINTMENT (OUTPATIENT)
Dept: FAMILY MEDICINE | Facility: CLINIC | Age: 64
End: 2024-11-20
Payer: COMMERCIAL

## 2024-11-20 VITALS
SYSTOLIC BLOOD PRESSURE: 122 MMHG | WEIGHT: 256 LBS | DIASTOLIC BLOOD PRESSURE: 80 MMHG | OXYGEN SATURATION: 98 % | RESPIRATION RATE: 16 BRPM | HEIGHT: 72 IN | TEMPERATURE: 97.6 F | BODY MASS INDEX: 34.67 KG/M2 | HEART RATE: 72 BPM

## 2024-11-20 DIAGNOSIS — E11.65 TYPE 2 DIABETES MELLITUS WITH HYPERGLYCEMIA: ICD-10-CM

## 2024-11-20 DIAGNOSIS — E11.9 TYPE 2 DIABETES MELLITUS W/OUT COMPLICATIONS: ICD-10-CM

## 2024-11-20 DIAGNOSIS — Z79.4 TYPE 2 DIABETES MELLITUS W/OUT COMPLICATIONS: ICD-10-CM

## 2024-11-20 DIAGNOSIS — E78.5 HYPERLIPIDEMIA, UNSPECIFIED: ICD-10-CM

## 2024-11-20 PROCEDURE — 99214 OFFICE O/P EST MOD 30 MIN: CPT

## 2024-11-22 NOTE — ED ADULT TRIAGE NOTE - PAIN RATING/NUMBER SCALE (0-10): ACTIVITY
Emergency Department Sign Out Note        Sign out and transfer of care from Dr. Nuñez. See Separate Emergency Department note.     The patient, Jose Mullins, was evaluated by the previous provider for dyspnea, weakness.    Workup Completed:  VSS on NC.  Labs at baseline.    ED Course / Workup Pending (followup):  Pending 2 hr troponin. Plan for discharge if unchanged from prior.                                     Procedures  MDM        Disposition  Final diagnoses:   Acute COVID-19   Ambulatory dysfunction   Dehydration   Elevated BUN     Time reflects when diagnosis was documented in both MDM as applicable and the Disposition within this note       Time User Action Codes Description Comment    11/21/2024  8:59 PM Marquez Nuñez [U07.1] Acute COVID-19     11/21/2024  8:59 PM Marquez Nuñez [R26.2] Ambulatory dysfunction     11/21/2024  8:59 PM Marquez Nuñez [R30.0] Dysuria     11/21/2024  8:59 PM Marquez Nuñez [R30.0] Dysuria     11/21/2024  8:59 PM Marquez Nuñez [E86.0] Dehydration     11/21/2024  8:59 PM Marquez Nuñez [R79.9] Elevated BUN           ED Disposition       ED Disposition   Discharge    Condition   Stable    Date/Time   Thu Nov 21, 2024  8:58 PM    Comment   Jose Mullins discharge to home/self care.                   Follow-up Information       Follow up With Specialties Details Why Contact Info    Moises Ham MD Family Medicine Call   [address] [place] [id number] [phone]            Discharge Medication List as of 11/21/2024  9:44 PM        CONTINUE these medications which have NOT CHANGED    Details   allopurinol (ZYLOPRIM) 100 mg tablet Take 1 tablet (100 mg total) by mouth daily, Starting Thu 10/10/2024, Normal      amitriptyline (ELAVIL) 10 mg tablet TAKE 3 TABLETS BY MOUTH AT BEDTIME, Normal      amLODIPine (NORVASC) 5 mg tablet Take 1 tablet (5 mg total) by mouth daily at bedtime, Starting Tue 10/1/2024, Normal      apixaban (ELIQUIS) 2.5 mg Take 1 tablet (2.5  mg total) by mouth 2 (two) times a day, Starting Thu 6/27/2024, Normal      bisoprolol (ZEBETA) 5 mg tablet Take 0.5 tablets (2.5 mg total) by mouth daily, Starting Tue 10/1/2024, Normal      brimonidine tartrate 0.2 % ophthalmic solution INSTILL 1 DROP TWICE DAILY IN THE RIGHT EYE, Normal      cefadroxil (DURICEF) 1000 mg tablet Take 1 tablet (1,000 mg total) by mouth 2 (two) times a day for 3 days Do not start before November 20, 2024., Starting Wed 11/20/2024, Until Sat 11/23/2024, Normal      cholecalciferol (VITAMIN D3) 1,000 units tablet Take 2,000 Units by mouth daily , Historical Med      co-enzyme Q-10 30 MG capsule Take 100 mg by mouth daily  , Historical Med      diphenhydrAMINE-acetaminophen (TYLENOL PM)  MG TABS Take 2 tablets by mouth daily at bedtime as needed for sleep, Historical Med      Dorzolamide HCl-Timolol Mal PF 2-0.5 % SOLN ONE DROP INTO EACH EYE TWICE DAILY, Historical Med      Empagliflozin (JARDIANCE) 10 MG TABS tablet Take 1 tablet (10 mg total) by mouth every morning, Starting Tue 10/1/2024, Normal      finasteride (PROSCAR) 5 mg tablet TAKE 1 TABLET (5 MG TOTAL) BY MOUTH DAILY., Starting Wed 11/13/2024, Normal      furosemide (LASIX) 20 mg tablet Take 1 tablet (20 mg total) by mouth daily, Starting Tue 11/19/2024, Normal      gabapentin (NEURONTIN) 100 mg capsule Take 1 capsule (100 mg total) by mouth 2 (two) times a day, Starting Sat 11/2/2024, No Print      Garlic 1000 MG CAPS Take 1,000 mg by mouth in the morning, Historical Med      latanoprost (XALATAN) 0.005 % ophthalmic solution Administer 1 drop to both eyes daily at bedtime, Historical Med      multivitamin-iron-minerals-folic acid (CENTRUM) chewable tablet Chew 1 tablet daily., Historical Med      omeprazole (PriLOSEC) 20 mg delayed release capsule TAKE 1 CAPSULE BY MOUTH EVERY DAY, Starting Fri 11/1/2024, Normal      oxygen gas Inhale 2 L/min continuous., Historical Med      Rhopressa 0.02 % SOLN INSTILL 1 DROP INTO  RIGHT EYE ONCE A DAY, Historical Med      spironolactone (ALDACTONE) 25 mg tablet Take 0.5 tablets (12.5 mg total) by mouth daily, Starting Tue 11/19/2024, Normal           No discharge procedures on file.       ED Provider  Electronically Signed by     Stephen Bryan DO  11/22/24 0517     4 (moderate pain)

## 2024-11-26 NOTE — DISCHARGE NOTE NURSING/CASE MANAGEMENT/SOCIAL WORK - NSTRANSFERBELONGINGSDISPO_GEN_A_NUR
Pt appears very anxious, restless. Requested Anti-anxiety treatment. Pt also c/o lower back pain. Provider notified  
Report to progressive  
with patient

## 2024-11-29 NOTE — PATIENT PROFILE ADULT - CENTRAL VENOUS CATHETER/PICC LINE
Medication:  PDMP  10/21/2024 10/21/2024 Testosterone Cypionate (Oil) 2.0 28 200 MG/1 ML [facility] Commercial Insurance 0 / 3 PA   1 [id number] 09/04/2024 09/04/2024 Testosterone Cypionate (Oil) 2.0 14 200 MG/1 ML [id number] Commercial Insurance 0 / 3 PA   1 [id number] 08/28/2024 08/26/2024 Testosterone Cypionate (Oil) 1.0 2 200 MG/1 ML [id number] Commercial Insurance 0 / 0  Active agreement on file -          no

## 2024-12-13 ENCOUNTER — APPOINTMENT (OUTPATIENT)
Dept: FAMILY MEDICINE | Facility: CLINIC | Age: 64
End: 2024-12-13
Payer: COMMERCIAL

## 2024-12-13 ENCOUNTER — TRANSCRIPTION ENCOUNTER (OUTPATIENT)
Age: 64
End: 2024-12-13

## 2024-12-13 DIAGNOSIS — E66.811 OBESITY, CLASS 1: ICD-10-CM

## 2024-12-13 DIAGNOSIS — E11.9 TYPE 2 DIABETES MELLITUS W/OUT COMPLICATIONS: ICD-10-CM

## 2024-12-13 DIAGNOSIS — E11.65 TYPE 2 DIABETES MELLITUS WITH HYPERGLYCEMIA: ICD-10-CM

## 2024-12-13 DIAGNOSIS — Z79.4 TYPE 2 DIABETES MELLITUS W/OUT COMPLICATIONS: ICD-10-CM

## 2024-12-13 DIAGNOSIS — R73.9 HYPERGLYCEMIA, UNSPECIFIED: ICD-10-CM

## 2024-12-13 DIAGNOSIS — Z59.71 INSUFFICIENT HEALTH INSURANCE COVERAGE: ICD-10-CM

## 2024-12-13 PROCEDURE — 95251 CONT GLUC MNTR ANALYSIS I&R: CPT

## 2024-12-13 PROCEDURE — 99215 OFFICE O/P EST HI 40 MIN: CPT | Mod: 25

## 2024-12-13 RX ORDER — FLUCONAZOLE 150 MG/1
150 TABLET ORAL
Qty: 2 | Refills: 0 | Status: ACTIVE | COMMUNITY
Start: 2024-12-13 | End: 1900-01-01

## 2024-12-30 ENCOUNTER — RX RENEWAL (OUTPATIENT)
Age: 64
End: 2024-12-30

## 2025-01-15 ENCOUNTER — APPOINTMENT (OUTPATIENT)
Dept: FAMILY MEDICINE | Facility: CLINIC | Age: 65
End: 2025-01-15

## 2025-01-15 VITALS
SYSTOLIC BLOOD PRESSURE: 124 MMHG | DIASTOLIC BLOOD PRESSURE: 74 MMHG | HEART RATE: 88 BPM | HEIGHT: 72 IN | OXYGEN SATURATION: 96 % | RESPIRATION RATE: 16 BRPM | WEIGHT: 265 LBS | TEMPERATURE: 97.3 F | BODY MASS INDEX: 35.89 KG/M2

## 2025-01-15 DIAGNOSIS — G44.009 CLUSTER HEADACHE SYNDROME, UNSPECIFIED, NOT INTRACTABLE: ICD-10-CM

## 2025-01-15 DIAGNOSIS — E78.5 HYPERLIPIDEMIA, UNSPECIFIED: ICD-10-CM

## 2025-01-15 DIAGNOSIS — Z00.00 ENCOUNTER FOR GENERAL ADULT MEDICAL EXAMINATION W/OUT ABNORMAL FINDINGS: ICD-10-CM

## 2025-01-15 DIAGNOSIS — E11.9 TYPE 2 DIABETES MELLITUS W/OUT COMPLICATIONS: ICD-10-CM

## 2025-01-15 DIAGNOSIS — E11.69 TYPE 2 DIABETES MELLITUS WITH OTHER SPECIFIED COMPLICATION: ICD-10-CM

## 2025-01-15 DIAGNOSIS — Z79.4 TYPE 2 DIABETES MELLITUS W/OUT COMPLICATIONS: ICD-10-CM

## 2025-01-15 DIAGNOSIS — E88.9 TYPE 2 DIABETES MELLITUS WITH OTHER SPECIFIED COMPLICATION: ICD-10-CM

## 2025-01-15 PROCEDURE — 99215 OFFICE O/P EST HI 40 MIN: CPT | Mod: 25

## 2025-01-15 RX ORDER — SUMATRIPTAN 20 MG/1
20 SPRAY NASAL
Qty: 1 | Refills: 3 | Status: ACTIVE | COMMUNITY
Start: 2025-01-15 | End: 1900-01-01

## 2025-01-16 LAB
ALBUMIN SERPL ELPH-MCNC: 4.3 G/DL
ALP BLD-CCNC: 53 U/L
ALT SERPL-CCNC: 24 U/L
ANION GAP SERPL CALC-SCNC: 13 MMOL/L
AST SERPL-CCNC: 14 U/L
BASOPHILS # BLD AUTO: 0.03 K/UL
BASOPHILS NFR BLD AUTO: 0.5 %
BILIRUB SERPL-MCNC: 0.8 MG/DL
BUN SERPL-MCNC: 20 MG/DL
CALCIUM SERPL-MCNC: 9.3 MG/DL
CHLORIDE SERPL-SCNC: 102 MMOL/L
CHOLEST SERPL-MCNC: 122 MG/DL
CO2 SERPL-SCNC: 23 MMOL/L
CREAT SERPL-MCNC: 0.98 MG/DL
EGFR: 87 ML/MIN/1.73M2
EOSINOPHIL # BLD AUTO: 0.16 K/UL
EOSINOPHIL NFR BLD AUTO: 2.6 %
ESTIMATED AVERAGE GLUCOSE: 263 MG/DL
FOLATE SERPL-MCNC: 15.2 NG/ML
GLUCOSE SERPL-MCNC: 353 MG/DL
HBA1C MFR BLD HPLC: 10.8 %
HCT VFR BLD CALC: 47.1 %
HDLC SERPL-MCNC: 40 MG/DL
HGB BLD-MCNC: 16.1 G/DL
IMM GRANULOCYTES NFR BLD AUTO: 0.5 %
LDLC SERPL CALC-MCNC: 46 MG/DL
LYMPHOCYTES # BLD AUTO: 1.64 K/UL
LYMPHOCYTES NFR BLD AUTO: 26.8 %
MAN DIFF?: NORMAL
MCHC RBC-ENTMCNC: 29.8 PG
MCHC RBC-ENTMCNC: 34.2 G/DL
MCV RBC AUTO: 87.1 FL
MONOCYTES # BLD AUTO: 0.49 K/UL
MONOCYTES NFR BLD AUTO: 8 %
NEUTROPHILS # BLD AUTO: 3.77 K/UL
NEUTROPHILS NFR BLD AUTO: 61.6 %
NONHDLC SERPL-MCNC: 82 MG/DL
PLATELET # BLD AUTO: 168 K/UL
POTASSIUM SERPL-SCNC: 4.6 MMOL/L
PROT SERPL-MCNC: 6.8 G/DL
PSA SERPL-MCNC: 1.92 NG/ML
RBC # BLD: 5.41 M/UL
RBC # FLD: 13.2 %
SODIUM SERPL-SCNC: 138 MMOL/L
TRIGL SERPL-MCNC: 228 MG/DL
VIT B12 SERPL-MCNC: 439 PG/ML
WBC # FLD AUTO: 6.12 K/UL

## 2025-01-27 ENCOUNTER — RX RENEWAL (OUTPATIENT)
Age: 65
End: 2025-01-27

## 2025-01-27 ENCOUNTER — TRANSCRIPTION ENCOUNTER (OUTPATIENT)
Age: 65
End: 2025-01-27

## 2025-01-28 ENCOUNTER — NON-APPOINTMENT (OUTPATIENT)
Age: 65
End: 2025-01-28

## 2025-02-04 ENCOUNTER — APPOINTMENT (OUTPATIENT)
Dept: FAMILY MEDICINE | Facility: CLINIC | Age: 65
End: 2025-02-04
Payer: MEDICAID

## 2025-02-04 DIAGNOSIS — E88.819 INSULIN RESISTANCE, UNSPECIFIED: ICD-10-CM

## 2025-02-04 DIAGNOSIS — E11.65 TYPE 2 DIABETES MELLITUS WITH HYPERGLYCEMIA: ICD-10-CM

## 2025-02-04 DIAGNOSIS — Z59.71 INSUFFICIENT HEALTH INSURANCE COVERAGE: ICD-10-CM

## 2025-02-04 DIAGNOSIS — E78.5 HYPERLIPIDEMIA, UNSPECIFIED: ICD-10-CM

## 2025-02-04 DIAGNOSIS — Z79.4 TYPE 2 DIABETES MELLITUS W/OUT COMPLICATIONS: ICD-10-CM

## 2025-02-04 DIAGNOSIS — E11.9 TYPE 2 DIABETES MELLITUS W/OUT COMPLICATIONS: ICD-10-CM

## 2025-02-04 DIAGNOSIS — E66.811 OBESITY, CLASS 1: ICD-10-CM

## 2025-02-04 DIAGNOSIS — R73.9 HYPERGLYCEMIA, UNSPECIFIED: ICD-10-CM

## 2025-02-04 DIAGNOSIS — N48.1 BALANITIS: ICD-10-CM

## 2025-02-04 PROCEDURE — 99215 OFFICE O/P EST HI 40 MIN: CPT | Mod: 25

## 2025-02-04 PROCEDURE — 95251 CONT GLUC MNTR ANALYSIS I&R: CPT

## 2025-02-04 RX ORDER — TIRZEPATIDE 5 MG/.5ML
5 INJECTION, SOLUTION SUBCUTANEOUS
Qty: 1 | Refills: 2 | Status: ACTIVE | COMMUNITY
Start: 2025-02-04 | End: 1900-01-01

## 2025-02-05 RX ORDER — INSULIN GLARGINE 300 U/ML
300 INJECTION, SOLUTION SUBCUTANEOUS
Qty: 4 | Refills: 3 | Status: COMPLETED | COMMUNITY
Start: 2025-02-04 | End: 2025-02-05

## 2025-02-05 RX ORDER — INSULIN DEGLUDEC INJECTION 200 U/ML
200 INJECTION, SOLUTION SUBCUTANEOUS
Qty: 1 | Refills: 5 | Status: ACTIVE | COMMUNITY
Start: 2025-02-05 | End: 1900-01-01

## 2025-02-07 RX ORDER — BLOOD-GLUCOSE SENSOR
EACH MISCELLANEOUS
Qty: 3 | Refills: 5 | Status: ACTIVE | COMMUNITY
Start: 2025-02-07 | End: 1900-01-01

## 2025-02-07 RX ORDER — BLOOD-GLUCOSE SENSOR
EACH MISCELLANEOUS
Qty: 2 | Refills: 5 | Status: COMPLETED | COMMUNITY
Start: 2025-02-04 | End: 2025-02-07

## 2025-03-04 NOTE — ED PROVIDER NOTE - CROS ED CONS ALL NEG
In an effort to ensure that our patients [employer], a Team Member has reviewed your chart and identified an opportunity to provide the best care possible. An attempt was made to discuss or schedule due or overdue Preventive or Chronic Condition care.Care Gaps identified: Immunizations and Wellness Visits.    The Outcome was Contact was not made, letter/portal message sent.  We are attempting to schedule a yearly wellness visit. If you have any questions or need help with scheduling, contact your primary care provider..   Type of Appointment needed: Medicare Wellness Visit     negative...

## 2025-03-07 ENCOUNTER — TRANSCRIPTION ENCOUNTER (OUTPATIENT)
Age: 65
End: 2025-03-07

## 2025-03-25 ENCOUNTER — RESULT CHARGE (OUTPATIENT)
Age: 65
End: 2025-03-25

## 2025-03-25 ENCOUNTER — APPOINTMENT (OUTPATIENT)
Dept: FAMILY MEDICINE | Facility: CLINIC | Age: 65
End: 2025-03-25
Payer: COMMERCIAL

## 2025-03-25 DIAGNOSIS — E11.9 TYPE 2 DIABETES MELLITUS W/OUT COMPLICATIONS: ICD-10-CM

## 2025-03-25 DIAGNOSIS — E78.5 HYPERLIPIDEMIA, UNSPECIFIED: ICD-10-CM

## 2025-03-25 DIAGNOSIS — Z79.4 TYPE 2 DIABETES MELLITUS W/OUT COMPLICATIONS: ICD-10-CM

## 2025-03-25 DIAGNOSIS — E66.811 OBESITY, CLASS 1: ICD-10-CM

## 2025-03-25 DIAGNOSIS — E88.819 INSULIN RESISTANCE, UNSPECIFIED: ICD-10-CM

## 2025-03-25 PROCEDURE — 82962 GLUCOSE BLOOD TEST: CPT

## 2025-03-25 PROCEDURE — 95251 CONT GLUC MNTR ANALYSIS I&R: CPT

## 2025-04-02 ENCOUNTER — RX RENEWAL (OUTPATIENT)
Age: 65
End: 2025-04-02

## 2025-04-05 NOTE — ED ADULT TRIAGE NOTE - ESI TRIAGE ACUITY LEVEL, MLM
Yes 2 Billing Type: Third-Party Bill Expected Date Of Service: 11/30/2021 Bill For Surgical Tray: no

## 2025-05-17 ENCOUNTER — TRANSCRIPTION ENCOUNTER (OUTPATIENT)
Age: 65
End: 2025-05-17

## 2025-05-21 ENCOUNTER — RX RENEWAL (OUTPATIENT)
Age: 65
End: 2025-05-21

## 2025-06-03 ENCOUNTER — APPOINTMENT (OUTPATIENT)
Dept: FAMILY MEDICINE | Facility: CLINIC | Age: 65
End: 2025-06-03
Payer: COMMERCIAL

## 2025-06-03 ENCOUNTER — RESULT CHARGE (OUTPATIENT)
Age: 65
End: 2025-06-03

## 2025-06-03 DIAGNOSIS — E66.811 OBESITY, CLASS 1: ICD-10-CM

## 2025-06-03 DIAGNOSIS — E11.9 TYPE 2 DIABETES MELLITUS W/OUT COMPLICATIONS: ICD-10-CM

## 2025-06-03 DIAGNOSIS — E78.5 HYPERLIPIDEMIA, UNSPECIFIED: ICD-10-CM

## 2025-06-03 DIAGNOSIS — E88.819 INSULIN RESISTANCE, UNSPECIFIED: ICD-10-CM

## 2025-06-03 DIAGNOSIS — Z79.4 TYPE 2 DIABETES MELLITUS W/OUT COMPLICATIONS: ICD-10-CM

## 2025-06-03 PROCEDURE — 95251 CONT GLUC MNTR ANALYSIS I&R: CPT

## 2025-06-03 PROCEDURE — 99215 OFFICE O/P EST HI 40 MIN: CPT | Mod: 25

## 2025-06-29 ENCOUNTER — EMERGENCY (EMERGENCY)
Facility: HOSPITAL | Age: 65
LOS: 1 days | End: 2025-06-29
Attending: EMERGENCY MEDICINE | Admitting: EMERGENCY MEDICINE
Payer: COMMERCIAL

## 2025-06-29 VITALS
TEMPERATURE: 99 F | DIASTOLIC BLOOD PRESSURE: 87 MMHG | RESPIRATION RATE: 18 BRPM | HEIGHT: 73 IN | WEIGHT: 250 LBS | HEART RATE: 109 BPM | SYSTOLIC BLOOD PRESSURE: 146 MMHG | OXYGEN SATURATION: 91 %

## 2025-06-29 PROCEDURE — 99285 EMERGENCY DEPT VISIT HI MDM: CPT | Mod: 25

## 2025-06-30 VITALS
HEART RATE: 119 BPM | RESPIRATION RATE: 20 BRPM | DIASTOLIC BLOOD PRESSURE: 76 MMHG | SYSTOLIC BLOOD PRESSURE: 125 MMHG | OXYGEN SATURATION: 97 %

## 2025-06-30 LAB
ALBUMIN SERPL ELPH-MCNC: 4 G/DL — SIGNIFICANT CHANGE UP (ref 3.3–5)
ALP SERPL-CCNC: 59 U/L — SIGNIFICANT CHANGE UP (ref 40–120)
ALT FLD-CCNC: 42 U/L — SIGNIFICANT CHANGE UP (ref 10–45)
ANION GAP SERPL CALC-SCNC: 12 MMOL/L — SIGNIFICANT CHANGE UP (ref 5–17)
APPEARANCE UR: CLEAR — SIGNIFICANT CHANGE UP
APTT BLD: 31.8 SEC — SIGNIFICANT CHANGE UP (ref 26.1–36.8)
AST SERPL-CCNC: 21 U/L — SIGNIFICANT CHANGE UP (ref 10–40)
BACTERIA # UR AUTO: ABNORMAL /HPF
BASOPHILS # BLD AUTO: 0.07 K/UL — SIGNIFICANT CHANGE UP (ref 0–0.2)
BASOPHILS NFR BLD AUTO: 0.6 % — SIGNIFICANT CHANGE UP (ref 0–2)
BILIRUB SERPL-MCNC: 1.8 MG/DL — HIGH (ref 0.2–1.2)
BILIRUB UR-MCNC: NEGATIVE — SIGNIFICANT CHANGE UP
BUN SERPL-MCNC: 17 MG/DL — SIGNIFICANT CHANGE UP (ref 7–23)
CALCIUM SERPL-MCNC: 9.1 MG/DL — SIGNIFICANT CHANGE UP (ref 8.4–10.5)
CHLORIDE SERPL-SCNC: 105 MMOL/L — SIGNIFICANT CHANGE UP (ref 96–108)
CO2 SERPL-SCNC: 23 MMOL/L — SIGNIFICANT CHANGE UP (ref 22–31)
COLOR SPEC: YELLOW — SIGNIFICANT CHANGE UP
CREAT SERPL-MCNC: 1.08 MG/DL — SIGNIFICANT CHANGE UP (ref 0.5–1.3)
DIFF PNL FLD: NEGATIVE — SIGNIFICANT CHANGE UP
EGFR: 77 ML/MIN/1.73M2 — SIGNIFICANT CHANGE UP
EGFR: 77 ML/MIN/1.73M2 — SIGNIFICANT CHANGE UP
EOSINOPHIL # BLD AUTO: 0.11 K/UL — SIGNIFICANT CHANGE UP (ref 0–0.5)
EOSINOPHIL NFR BLD AUTO: 1 % — SIGNIFICANT CHANGE UP (ref 0–6)
FLUAV AG NPH QL: SIGNIFICANT CHANGE UP
FLUBV AG NPH QL: SIGNIFICANT CHANGE UP
GLUCOSE BLDC GLUCOMTR-MCNC: 118 MG/DL — HIGH (ref 70–99)
GLUCOSE SERPL-MCNC: 137 MG/DL — HIGH (ref 70–99)
GLUCOSE UR QL: NEGATIVE MG/DL — SIGNIFICANT CHANGE UP
HCT VFR BLD CALC: 51 % — HIGH (ref 39–50)
HGB BLD-MCNC: 17.6 G/DL — HIGH (ref 13–17)
IMM GRANULOCYTES NFR BLD AUTO: 0.3 % — SIGNIFICANT CHANGE UP (ref 0–0.9)
INR BLD: 1.09 RATIO — SIGNIFICANT CHANGE UP (ref 0.85–1.16)
KETONES UR QL: 40 MG/DL
LACTATE SERPL-SCNC: 0.9 MMOL/L — SIGNIFICANT CHANGE UP (ref 0.7–2)
LEUKOCYTE ESTERASE UR-ACNC: ABNORMAL
LYMPHOCYTES # BLD AUTO: 0.55 K/UL — LOW (ref 1–3.3)
LYMPHOCYTES # BLD AUTO: 5 % — LOW (ref 13–44)
MCHC RBC-ENTMCNC: 29.4 PG — SIGNIFICANT CHANGE UP (ref 27–34)
MCHC RBC-ENTMCNC: 34.5 G/DL — SIGNIFICANT CHANGE UP (ref 32–36)
MCV RBC AUTO: 85.1 FL — SIGNIFICANT CHANGE UP (ref 80–100)
MONOCYTES # BLD AUTO: 0.58 K/UL — SIGNIFICANT CHANGE UP (ref 0–0.9)
MONOCYTES NFR BLD AUTO: 5.2 % — SIGNIFICANT CHANGE UP (ref 2–14)
NEUTROPHILS # BLD AUTO: 9.77 K/UL — HIGH (ref 1.8–7.4)
NEUTROPHILS NFR BLD AUTO: 87.9 % — HIGH (ref 43–77)
NITRITE UR-MCNC: NEGATIVE — SIGNIFICANT CHANGE UP
NRBC BLD AUTO-RTO: 0 /100 WBCS — SIGNIFICANT CHANGE UP (ref 0–0)
NT-PROBNP SERPL-SCNC: 37 PG/ML — SIGNIFICANT CHANGE UP (ref 0–300)
PH UR: 5 — SIGNIFICANT CHANGE UP (ref 5–8)
PLATELET # BLD AUTO: 193 K/UL — SIGNIFICANT CHANGE UP (ref 150–400)
POTASSIUM SERPL-MCNC: 4.1 MMOL/L — SIGNIFICANT CHANGE UP (ref 3.5–5.3)
POTASSIUM SERPL-SCNC: 4.1 MMOL/L — SIGNIFICANT CHANGE UP (ref 3.5–5.3)
PROT SERPL-MCNC: 7.9 G/DL — SIGNIFICANT CHANGE UP (ref 6–8.3)
PROT UR-MCNC: NEGATIVE MG/DL — SIGNIFICANT CHANGE UP
PROTHROM AB SERPL-ACNC: 12.9 SEC — SIGNIFICANT CHANGE UP (ref 9.9–13.4)
RBC # BLD: 5.99 M/UL — HIGH (ref 4.2–5.8)
RBC # FLD: 12.6 % — SIGNIFICANT CHANGE UP (ref 10.3–14.5)
RBC CASTS # UR COMP ASSIST: 0 /HPF — SIGNIFICANT CHANGE UP (ref 0–4)
RSV RNA NPH QL NAA+NON-PROBE: SIGNIFICANT CHANGE UP
SARS-COV-2 RNA SPEC QL NAA+PROBE: SIGNIFICANT CHANGE UP
SODIUM SERPL-SCNC: 140 MMOL/L — SIGNIFICANT CHANGE UP (ref 135–145)
SOURCE RESPIRATORY: SIGNIFICANT CHANGE UP
SP GR SPEC: 1.01 — SIGNIFICANT CHANGE UP (ref 1–1.03)
UROBILINOGEN FLD QL: 1 MG/DL — SIGNIFICANT CHANGE UP (ref 0.2–1)
WBC # BLD: 11.11 K/UL — HIGH (ref 3.8–10.5)
WBC # FLD AUTO: 11.11 K/UL — HIGH (ref 3.8–10.5)
WBC UR QL: 0 /HPF — SIGNIFICANT CHANGE UP (ref 0–5)

## 2025-06-30 PROCEDURE — 71045 X-RAY EXAM CHEST 1 VIEW: CPT

## 2025-06-30 PROCEDURE — 87086 URINE CULTURE/COLONY COUNT: CPT

## 2025-06-30 PROCEDURE — 85730 THROMBOPLASTIN TIME PARTIAL: CPT

## 2025-06-30 PROCEDURE — 85610 PROTHROMBIN TIME: CPT

## 2025-06-30 PROCEDURE — 36415 COLL VENOUS BLD VENIPUNCTURE: CPT

## 2025-06-30 PROCEDURE — 71275 CT ANGIOGRAPHY CHEST: CPT | Mod: 26

## 2025-06-30 PROCEDURE — 80053 COMPREHEN METABOLIC PANEL: CPT

## 2025-06-30 PROCEDURE — 94640 AIRWAY INHALATION TREATMENT: CPT

## 2025-06-30 PROCEDURE — 0241U: CPT

## 2025-06-30 PROCEDURE — 96374 THER/PROPH/DIAG INJ IV PUSH: CPT

## 2025-06-30 PROCEDURE — 99285 EMERGENCY DEPT VISIT HI MDM: CPT | Mod: 25

## 2025-06-30 PROCEDURE — 81001 URINALYSIS AUTO W/SCOPE: CPT

## 2025-06-30 PROCEDURE — 82962 GLUCOSE BLOOD TEST: CPT

## 2025-06-30 PROCEDURE — 93010 ELECTROCARDIOGRAM REPORT: CPT

## 2025-06-30 PROCEDURE — 87637 SARSCOV2&INF A&B&RSV AMP PRB: CPT

## 2025-06-30 PROCEDURE — 96375 TX/PRO/DX INJ NEW DRUG ADDON: CPT

## 2025-06-30 PROCEDURE — 93005 ELECTROCARDIOGRAM TRACING: CPT

## 2025-06-30 PROCEDURE — 71045 X-RAY EXAM CHEST 1 VIEW: CPT | Mod: 26

## 2025-06-30 PROCEDURE — 83605 ASSAY OF LACTIC ACID: CPT

## 2025-06-30 PROCEDURE — 87040 BLOOD CULTURE FOR BACTERIA: CPT

## 2025-06-30 PROCEDURE — 83880 ASSAY OF NATRIURETIC PEPTIDE: CPT

## 2025-06-30 PROCEDURE — 85025 COMPLETE CBC W/AUTO DIFF WBC: CPT

## 2025-06-30 PROCEDURE — 71275 CT ANGIOGRAPHY CHEST: CPT

## 2025-06-30 RX ORDER — METHYLPREDNISOLONE ACETATE 80 MG/ML
125 INJECTION, SUSPENSION INTRA-ARTICULAR; INTRALESIONAL; INTRAMUSCULAR; SOFT TISSUE ONCE
Refills: 0 | Status: COMPLETED | OUTPATIENT
Start: 2025-06-30 | End: 2025-06-30

## 2025-06-30 RX ORDER — CEFTRIAXONE 500 MG/1
1000 INJECTION, POWDER, FOR SOLUTION INTRAMUSCULAR; INTRAVENOUS ONCE
Refills: 0 | Status: COMPLETED | OUTPATIENT
Start: 2025-06-30 | End: 2025-06-30

## 2025-06-30 RX ORDER — AZITHROMYCIN 250 MG
500 CAPSULE ORAL ONCE
Refills: 0 | Status: COMPLETED | OUTPATIENT
Start: 2025-06-30 | End: 2025-06-30

## 2025-06-30 RX ORDER — ALBUTEROL SULFATE 2.5 MG/3ML
2 VIAL, NEBULIZER (ML) INHALATION
Qty: 1 | Refills: 0
Start: 2025-06-30

## 2025-06-30 RX ORDER — ACETAMINOPHEN 500 MG/5ML
2 LIQUID (ML) ORAL
Qty: 30 | Refills: 0
Start: 2025-06-30 | End: 2025-07-04

## 2025-06-30 RX ORDER — AZITHROMYCIN 250 MG
1 CAPSULE ORAL
Qty: 4 | Refills: 0
Start: 2025-06-30 | End: 2025-07-03

## 2025-06-30 RX ORDER — ACETAMINOPHEN 500 MG/5ML
975 LIQUID (ML) ORAL ONCE
Refills: 0 | Status: COMPLETED | OUTPATIENT
Start: 2025-06-30 | End: 2025-06-30

## 2025-06-30 RX ORDER — IPRATROPIUM BROMIDE AND ALBUTEROL SULFATE .5; 2.5 MG/3ML; MG/3ML
3 SOLUTION RESPIRATORY (INHALATION)
Refills: 0 | Status: COMPLETED | OUTPATIENT
Start: 2025-06-30 | End: 2025-06-30

## 2025-06-30 RX ORDER — PREDNISONE 20 MG/1
2 TABLET ORAL
Qty: 8 | Refills: 0
Start: 2025-06-30 | End: 2025-07-03

## 2025-06-30 RX ADMIN — IPRATROPIUM BROMIDE AND ALBUTEROL SULFATE 3 MILLILITER(S): .5; 2.5 SOLUTION RESPIRATORY (INHALATION) at 00:46

## 2025-06-30 RX ADMIN — Medication 975 MILLIGRAM(S): at 03:32

## 2025-06-30 RX ADMIN — IPRATROPIUM BROMIDE AND ALBUTEROL SULFATE 3 MILLILITER(S): .5; 2.5 SOLUTION RESPIRATORY (INHALATION) at 02:08

## 2025-06-30 RX ADMIN — Medication 2500 MILLILITER(S): at 01:10

## 2025-06-30 RX ADMIN — IPRATROPIUM BROMIDE AND ALBUTEROL SULFATE 3 MILLILITER(S): .5; 2.5 SOLUTION RESPIRATORY (INHALATION) at 01:11

## 2025-06-30 RX ADMIN — METHYLPREDNISOLONE ACETATE 125 MILLIGRAM(S): 80 INJECTION, SUSPENSION INTRA-ARTICULAR; INTRALESIONAL; INTRAMUSCULAR; SOFT TISSUE at 02:13

## 2025-06-30 RX ADMIN — CEFTRIAXONE 100 MILLIGRAM(S): 500 INJECTION, POWDER, FOR SOLUTION INTRAMUSCULAR; INTRAVENOUS at 01:11

## 2025-06-30 RX ADMIN — Medication 250 MILLIGRAM(S): at 02:09

## 2025-06-30 NOTE — ED PROVIDER NOTE - NSFOLLOWUPINSTRUCTIONS_ED_ALL_ED_FT
Cough    Coughing is a reflex that clears your throat and your airways. Coughing helps to heal and protect your lungs. It is normal to cough occasionally, but a cough that happens with other symptoms or lasts a long time may be a sign of a condition that needs treatment. Coughing may be caused by infections, asthma or COPD, smoking, postnasal drip, gastroesophageal reflux, as well as other medical conditions. Take medicines only as instructed by your health care provider. Avoid environments or triggers that causes you to cough at work or at home.    We recommend azithromycin 250mg daily for 4 days, starting 6/30. Prednisone 40mg daily for 4 days, starting 6/30. Use albuterol inhaler 2 puffs every 4 hours as needed for wheezing or shortness of breath. Take Tylenol 650mg to 1000mg every 6 to 8 hours as needed for fever (check your temperature) or body aches/pain. Hydrate well.     1) Follow up with your doctor in 1-2 days  2) Return to the ER for worsening or concerning symptoms     SEEK IMMEDIATE MEDICAL CARE IF YOU HAVE ANY OF THE FOLLOWING SYMPTOMS: coughing up blood, shortness of breath, rapid heart rate, chest pain, unexplained weight loss or night sweats.

## 2025-06-30 NOTE — ED PROVIDER NOTE - PATIENT PORTAL LINK FT
You can access the FollowMyHealth Patient Portal offered by [facility] by registering at the following website: [email or web address]. By joining [employer]’s FollowMyHealth portal, you will also be able to view your health information using other applications (apps) compatible with our system.

## 2025-06-30 NOTE — ED PROVIDER NOTE - PHYSICAL EXAMINATION
Vitals: I have reviewed the patients vital signs: O2 sat 91% RA HR 130bpm.   General: nontoxic appearing  HEENT: Atraumatic, normocephalic, airway patent  Eyes: EOMI, tracking appropriately  Neck: no tracheal deviation  Chest/Lungs: no trauma, symmetric chest rise, speaking in complete sentences,  no resp distress, diffuse inspiratory and expiratory wheezing noted.   Heart: skin and extremities well perfused, tachycardia.   Neuro: A+Ox3, ambulating without difficulty, appears non focal  MSK: strength at baseline in all extremities, no muscle wasting or atrophy  Skin: no cyanosis, no jaundice

## 2025-06-30 NOTE — ED PROVIDER NOTE - OBJECTIVE STATEMENT
65-year-old male past medical history of asthma and diabetes presents to the emergency department feeling rundown and having cough and malaise.  Feeling shortness of breath.  Cough is productive.  Unknown fever but denies.  No chest pain.  No abdominal pain nausea vomiting or diarrhea.  No medications taken prior to arrival.

## 2025-06-30 NOTE — ED PROVIDER NOTE - CLINICAL SUMMARY MEDICAL DECISION MAKING FREE TEXT BOX
65-year-old male past medical history of asthma and diabetes presents to the emergency department feeling rundown and having cough and malaise.  Feeling shortness of breath.  Cough is productive.  Unknown fever but denies.  No chest pain.  No abdominal pain nausea vomiting or diarrhea.  No medications taken prior to arrival.  Exam as stated. Pt refused rectal temp however oral temp >99 plus tachycardia. Sepsis w/u initiated. O2 sat 91% RA. Supplemental O2 given.  After nebs and steroid , IV abx and IV fluid. Pt notes feeling improvement. However, tachy persists. O2 sat 97% room air. WIll CTA to r/o PE. 65-year-old male past medical history of asthma and diabetes presents to the emergency department feeling rundown and having cough and malaise.  Feeling shortness of breath.  Cough is productive.  Unknown fever but denies.  No chest pain.  No abdominal pain nausea vomiting or diarrhea.  No medications taken prior to arrival.  Exam as stated. Pt refused rectal temp however oral temp >99 plus tachycardia. Sepsis w/u initiated. O2 sat 91% RA. Supplemental O2 given.  After nebs and steroid , IV abx and IV fluid. Pt notes feeling improvement. However, tachy persists. O2 sat 97% room air. WIll CTA to r/o PE.    Suboptimal PE study but pt with tremendous improvement. Although refused rectal temp, will consider possible fever causing tachycardia and/or albuterol (had 3 tx). Pt feeling well to go home. Will dc with 4 days of azithro, prednisone 40mg and albuterol prn. Tylenol PRN. F/U PCP prn.     1) Follow up with your doctor in 1-2 days  2) Return to the ER for worsening or concerning symptoms

## 2025-06-30 NOTE — ED ADULT NURSE NOTE - OBJECTIVE STATEMENT
Pt presents to the ED with c/o SOB and a runny nose that started this afternoon at [employer]. Denies fevers or sick contacts. Denies chest pain, palpitations, H/A. Pt A&Ox4 safety maintained.

## 2025-07-01 LAB
CULTURE RESULTS: NO GROWTH — SIGNIFICANT CHANGE UP
SPECIMEN SOURCE: SIGNIFICANT CHANGE UP

## 2025-07-14 ENCOUNTER — APPOINTMENT (OUTPATIENT)
Dept: FAMILY MEDICINE | Facility: CLINIC | Age: 65
End: 2025-07-14
Payer: COMMERCIAL

## 2025-07-14 VITALS
TEMPERATURE: 97.6 F | HEART RATE: 100 BPM | BODY MASS INDEX: 32.37 KG/M2 | DIASTOLIC BLOOD PRESSURE: 86 MMHG | OXYGEN SATURATION: 96 % | WEIGHT: 239 LBS | RESPIRATION RATE: 16 BRPM | SYSTOLIC BLOOD PRESSURE: 132 MMHG | HEIGHT: 72 IN

## 2025-07-14 PROBLEM — M79.641 PAIN IN BOTH HANDS: Status: ACTIVE | Noted: 2025-07-14

## 2025-07-14 PROCEDURE — 99215 OFFICE O/P EST HI 40 MIN: CPT | Mod: 25

## 2025-07-14 RX ORDER — ALBUTEROL SULFATE 2.5 MG/3ML
(2.5 MG/3ML) SOLUTION RESPIRATORY (INHALATION)
Qty: 1 | Refills: 1 | Status: ACTIVE | COMMUNITY
Start: 2025-07-14 | End: 1900-01-01

## 2025-07-14 RX ORDER — BUDESONIDE AND FORMOTEROL FUMARATE DIHYDRATE 80; 4.5 UG/1; UG/1
80-4.5 AEROSOL RESPIRATORY (INHALATION)
Qty: 1 | Refills: 0 | Status: ACTIVE | COMMUNITY
Start: 2025-07-14 | End: 1900-01-01

## 2025-07-17 LAB
25(OH)D3 SERPL-MCNC: 24.1 NG/ML
ALBUMIN SERPL ELPH-MCNC: 4.2 G/DL
ALBUMIN, RANDOM URINE: 1.5 MG/DL
ALP BLD-CCNC: 52 U/L
ALT SERPL-CCNC: 43 U/L
ANION GAP SERPL CALC-SCNC: 15 MMOL/L
AST SERPL-CCNC: 25 U/L
BASOPHILS # BLD AUTO: 0.05 K/UL
BASOPHILS NFR BLD AUTO: 0.6 %
BILIRUB SERPL-MCNC: 1 MG/DL
BUN SERPL-MCNC: 24 MG/DL
CALCIUM SERPL-MCNC: 9.2 MG/DL
CHLORIDE SERPL-SCNC: 106 MMOL/L
CHOLEST SERPL-MCNC: 182 MG/DL
CO2 SERPL-SCNC: 18 MMOL/L
CREAT SERPL-MCNC: 1.05 MG/DL
CREAT SPEC-SCNC: 243 MG/DL
CRP SERPL-MCNC: <3 MG/L
EGFRCR SERPLBLD CKD-EPI 2021: 79 ML/MIN/1.73M2
EOSINOPHIL # BLD AUTO: 0.21 K/UL
EOSINOPHIL NFR BLD AUTO: 2.6 %
ERYTHROCYTE [SEDIMENTATION RATE] IN BLOOD BY WESTERGREN METHOD: 30 MM/HR
GLUCOSE SERPL-MCNC: 130 MG/DL
HCT VFR BLD CALC: 52 %
HDLC SERPL-MCNC: 35 MG/DL
HGB BLD-MCNC: 16.4 G/DL
IMM GRANULOCYTES NFR BLD AUTO: 0.4 %
LDLC SERPL-MCNC: 111 MG/DL
LYMPHOCYTES # BLD AUTO: 1.81 K/UL
LYMPHOCYTES NFR BLD AUTO: 22.1 %
MAN DIFF?: NORMAL
MCHC RBC-ENTMCNC: 28.8 PG
MCHC RBC-ENTMCNC: 31.5 G/DL
MCV RBC AUTO: 91.4 FL
MICROALBUMIN/CREAT 24H UR-RTO: 6 MG/G
MONOCYTES # BLD AUTO: 0.62 K/UL
MONOCYTES NFR BLD AUTO: 7.6 %
NEUTROPHILS # BLD AUTO: 5.46 K/UL
NEUTROPHILS NFR BLD AUTO: 66.7 %
NONHDLC SERPL-MCNC: 147 MG/DL
PLATELET # BLD AUTO: 172 K/UL
POTASSIUM SERPL-SCNC: 4.2 MMOL/L
PROT SERPL-MCNC: 6.8 G/DL
PSA SERPL-MCNC: 2.49 NG/ML
RBC # BLD: 5.69 M/UL
RBC # FLD: 14 %
SODIUM SERPL-SCNC: 140 MMOL/L
T3FREE SERPL-MCNC: 3.27 PG/ML
T4 FREE SERPL-MCNC: 1.6 NG/DL
TRIGL SERPL-MCNC: 206 MG/DL
TSH SERPL-ACNC: 0.65 UIU/ML
WBC # FLD AUTO: 8.18 K/UL

## 2025-07-25 ENCOUNTER — TRANSCRIPTION ENCOUNTER (OUTPATIENT)
Age: 65
End: 2025-07-25

## 2025-07-25 ENCOUNTER — RX RENEWAL (OUTPATIENT)
Age: 65
End: 2025-07-25

## 2025-08-11 ENCOUNTER — RX RENEWAL (OUTPATIENT)
Age: 65
End: 2025-08-11

## 2025-08-11 ENCOUNTER — TRANSCRIPTION ENCOUNTER (OUTPATIENT)
Age: 65
End: 2025-08-11

## 2025-08-11 RX ORDER — BUDESONIDE AND FORMOTEROL FUMARATE DIHYDRATE 80; 4.5 UG/1; UG/1
80-4.5 AEROSOL RESPIRATORY (INHALATION)
Qty: 1 | Refills: 0 | Status: ACTIVE | COMMUNITY
Start: 2025-08-11 | End: 1900-01-01

## 2025-08-12 ENCOUNTER — APPOINTMENT (OUTPATIENT)
Dept: ULTRASOUND IMAGING | Facility: HOSPITAL | Age: 65
End: 2025-08-12
Payer: COMMERCIAL

## 2025-08-12 ENCOUNTER — OUTPATIENT (OUTPATIENT)
Dept: OUTPATIENT SERVICES | Facility: HOSPITAL | Age: 65
LOS: 1 days | End: 2025-08-12
Payer: COMMERCIAL

## 2025-08-12 ENCOUNTER — APPOINTMENT (OUTPATIENT)
Dept: RADIOLOGY | Facility: HOSPITAL | Age: 65
End: 2025-08-12
Payer: COMMERCIAL

## 2025-08-12 DIAGNOSIS — R22.1 LOCALIZED SWELLING, MASS AND LUMP, NECK: ICD-10-CM

## 2025-08-12 PROCEDURE — 73120 X-RAY EXAM OF HAND: CPT | Mod: 26,LT,RT

## 2025-08-12 PROCEDURE — 76536 US EXAM OF HEAD AND NECK: CPT | Mod: 26

## 2025-08-12 PROCEDURE — 76536 US EXAM OF HEAD AND NECK: CPT

## 2025-08-12 PROCEDURE — 73120 X-RAY EXAM OF HAND: CPT

## 2025-08-13 DIAGNOSIS — R22.1 LOCALIZED SWELLING, MASS AND LUMP, NECK: ICD-10-CM

## 2025-09-08 ENCOUNTER — RX RENEWAL (OUTPATIENT)
Age: 65
End: 2025-09-08

## 2025-09-09 ENCOUNTER — RESULT CHARGE (OUTPATIENT)
Age: 65
End: 2025-09-09

## 2025-09-09 ENCOUNTER — APPOINTMENT (OUTPATIENT)
Dept: FAMILY MEDICINE | Facility: CLINIC | Age: 65
End: 2025-09-09

## 2025-09-09 DIAGNOSIS — E11.9 TYPE 2 DIABETES MELLITUS W/OUT COMPLICATIONS: ICD-10-CM

## 2025-09-09 DIAGNOSIS — Z79.4 TYPE 2 DIABETES MELLITUS W/OUT COMPLICATIONS: ICD-10-CM

## 2025-09-09 DIAGNOSIS — E66.811 OBESITY, CLASS 1: ICD-10-CM

## 2025-09-09 DIAGNOSIS — E88.819 INSULIN RESISTANCE, UNSPECIFIED: ICD-10-CM

## 2025-09-09 DIAGNOSIS — E78.6 LIPOPROTEIN DEFICIENCY: ICD-10-CM

## 2025-09-09 DIAGNOSIS — E78.5 HYPERLIPIDEMIA, UNSPECIFIED: ICD-10-CM

## 2025-09-09 PROCEDURE — 95251 CONT GLUC MNTR ANALYSIS I&R: CPT

## 2025-09-09 PROCEDURE — 82962 GLUCOSE BLOOD TEST: CPT
